# Patient Record
Sex: FEMALE | Race: WHITE | NOT HISPANIC OR LATINO | Employment: STUDENT | URBAN - NONMETROPOLITAN AREA
[De-identification: names, ages, dates, MRNs, and addresses within clinical notes are randomized per-mention and may not be internally consistent; named-entity substitution may affect disease eponyms.]

---

## 2023-11-01 ENCOUNTER — HOSPITAL ENCOUNTER (EMERGENCY)
Facility: HOSPITAL | Age: 28
Discharge: HOME | End: 2023-11-01
Attending: STUDENT IN AN ORGANIZED HEALTH CARE EDUCATION/TRAINING PROGRAM | Admitting: STUDENT IN AN ORGANIZED HEALTH CARE EDUCATION/TRAINING PROGRAM
Payer: COMMERCIAL

## 2023-11-01 ENCOUNTER — APPOINTMENT (OUTPATIENT)
Dept: CT IMAGING | Facility: HOSPITAL | Age: 28
End: 2023-11-01
Payer: COMMERCIAL

## 2023-11-01 VITALS
OXYGEN SATURATION: 100 % | TEMPERATURE: 98.6 F | HEIGHT: 62 IN | RESPIRATION RATE: 18 BRPM | BODY MASS INDEX: 27.23 KG/M2 | HEART RATE: 82 BPM | SYSTOLIC BLOOD PRESSURE: 121 MMHG | DIASTOLIC BLOOD PRESSURE: 93 MMHG | WEIGHT: 148 LBS

## 2023-11-01 DIAGNOSIS — N20.0 NEPHROLITHIASIS: Primary | ICD-10-CM

## 2023-11-01 DIAGNOSIS — N39.0 ACUTE UTI: ICD-10-CM

## 2023-11-01 LAB
ALBUMIN SERPL-MCNC: 4.4 G/DL (ref 3.5–5.2)
ALBUMIN/GLOB SERPL: 1.3 G/DL
ALP SERPL-CCNC: 53 U/L (ref 39–117)
ALT SERPL W P-5'-P-CCNC: 10 U/L (ref 1–33)
ANION GAP SERPL CALCULATED.3IONS-SCNC: 11.8 MMOL/L (ref 5–15)
AST SERPL-CCNC: 11 U/L (ref 1–32)
BACTERIA UR QL AUTO: ABNORMAL /HPF
BASOPHILS # BLD AUTO: 0.11 10*3/MM3 (ref 0–0.2)
BASOPHILS NFR BLD AUTO: 1.2 % (ref 0–1.5)
BILIRUB SERPL-MCNC: 0.6 MG/DL (ref 0–1.2)
BILIRUB UR QL STRIP: NEGATIVE
BUN SERPL-MCNC: 14 MG/DL (ref 6–20)
BUN/CREAT SERPL: 14.6 (ref 7–25)
CALCIUM SPEC-SCNC: 9.3 MG/DL (ref 8.6–10.5)
CHLORIDE SERPL-SCNC: 100 MMOL/L (ref 98–107)
CLARITY UR: CLEAR
CO2 SERPL-SCNC: 24.2 MMOL/L (ref 22–29)
COLOR UR: YELLOW
CREAT SERPL-MCNC: 0.96 MG/DL (ref 0.57–1)
D-LACTATE SERPL-SCNC: 0.8 MMOL/L (ref 0.5–2)
DEPRECATED RDW RBC AUTO: 39.6 FL (ref 37–54)
EGFRCR SERPLBLD CKD-EPI 2021: 82.3 ML/MIN/1.73
EOSINOPHIL # BLD AUTO: 0.14 10*3/MM3 (ref 0–0.4)
EOSINOPHIL NFR BLD AUTO: 1.5 % (ref 0.3–6.2)
ERYTHROCYTE [DISTWIDTH] IN BLOOD BY AUTOMATED COUNT: 12.1 % (ref 12.3–15.4)
GLOBULIN UR ELPH-MCNC: 3.4 GM/DL
GLUCOSE SERPL-MCNC: 106 MG/DL (ref 65–99)
GLUCOSE UR STRIP-MCNC: NEGATIVE MG/DL
HCG SERPL QL: NEGATIVE
HCT VFR BLD AUTO: 45.5 % (ref 34–46.6)
HGB BLD-MCNC: 15.6 G/DL (ref 12–15.9)
HGB UR QL STRIP.AUTO: ABNORMAL
HOLD SPECIMEN: NORMAL
HOLD SPECIMEN: NORMAL
HYALINE CASTS UR QL AUTO: ABNORMAL /LPF
IMM GRANULOCYTES # BLD AUTO: 0.03 10*3/MM3 (ref 0–0.05)
IMM GRANULOCYTES NFR BLD AUTO: 0.3 % (ref 0–0.5)
KETONES UR QL STRIP: ABNORMAL
LEUKOCYTE ESTERASE UR QL STRIP.AUTO: ABNORMAL
LIPASE SERPL-CCNC: 33 U/L (ref 13–60)
LYMPHOCYTES # BLD AUTO: 2.43 10*3/MM3 (ref 0.7–3.1)
LYMPHOCYTES NFR BLD AUTO: 25.7 % (ref 19.6–45.3)
MCH RBC QN AUTO: 30.9 PG (ref 26.6–33)
MCHC RBC AUTO-ENTMCNC: 34.3 G/DL (ref 31.5–35.7)
MCV RBC AUTO: 90.1 FL (ref 79–97)
MONOCYTES # BLD AUTO: 0.9 10*3/MM3 (ref 0.1–0.9)
MONOCYTES NFR BLD AUTO: 9.5 % (ref 5–12)
NEUTROPHILS NFR BLD AUTO: 5.83 10*3/MM3 (ref 1.7–7)
NEUTROPHILS NFR BLD AUTO: 61.8 % (ref 42.7–76)
NITRITE UR QL STRIP: NEGATIVE
NRBC BLD AUTO-RTO: 0 /100 WBC (ref 0–0.2)
PH UR STRIP.AUTO: 6 [PH] (ref 5–8)
PLATELET # BLD AUTO: 412 10*3/MM3 (ref 140–450)
PMV BLD AUTO: 11.2 FL (ref 6–12)
POTASSIUM SERPL-SCNC: 4 MMOL/L (ref 3.5–5.2)
PROT SERPL-MCNC: 7.8 G/DL (ref 6–8.5)
PROT UR QL STRIP: ABNORMAL
RBC # BLD AUTO: 5.05 10*6/MM3 (ref 3.77–5.28)
RBC # UR STRIP: ABNORMAL /HPF
REF LAB TEST METHOD: ABNORMAL
SODIUM SERPL-SCNC: 136 MMOL/L (ref 136–145)
SP GR UR STRIP: 1.02 (ref 1–1.03)
SQUAMOUS #/AREA URNS HPF: ABNORMAL /HPF
UROBILINOGEN UR QL STRIP: ABNORMAL
WBC # UR STRIP: ABNORMAL /HPF
WBC NRBC COR # BLD: 9.44 10*3/MM3 (ref 3.4–10.8)
WHOLE BLOOD HOLD COAG: NORMAL
WHOLE BLOOD HOLD SPECIMEN: NORMAL

## 2023-11-01 PROCEDURE — 25010000002 ONDANSETRON PER 1 MG: Performed by: STUDENT IN AN ORGANIZED HEALTH CARE EDUCATION/TRAINING PROGRAM

## 2023-11-01 PROCEDURE — 83605 ASSAY OF LACTIC ACID: CPT | Performed by: STUDENT IN AN ORGANIZED HEALTH CARE EDUCATION/TRAINING PROGRAM

## 2023-11-01 PROCEDURE — 25810000003 SODIUM CHLORIDE 0.9 % SOLUTION: Performed by: STUDENT IN AN ORGANIZED HEALTH CARE EDUCATION/TRAINING PROGRAM

## 2023-11-01 PROCEDURE — 25510000001 IOPAMIDOL 61 % SOLUTION: Performed by: STUDENT IN AN ORGANIZED HEALTH CARE EDUCATION/TRAINING PROGRAM

## 2023-11-01 PROCEDURE — 74177 CT ABD & PELVIS W/CONTRAST: CPT

## 2023-11-01 PROCEDURE — 83690 ASSAY OF LIPASE: CPT | Performed by: STUDENT IN AN ORGANIZED HEALTH CARE EDUCATION/TRAINING PROGRAM

## 2023-11-01 PROCEDURE — 96374 THER/PROPH/DIAG INJ IV PUSH: CPT

## 2023-11-01 PROCEDURE — 25010000002 KETOROLAC TROMETHAMINE PER 15 MG: Performed by: STUDENT IN AN ORGANIZED HEALTH CARE EDUCATION/TRAINING PROGRAM

## 2023-11-01 PROCEDURE — 84703 CHORIONIC GONADOTROPIN ASSAY: CPT | Performed by: STUDENT IN AN ORGANIZED HEALTH CARE EDUCATION/TRAINING PROGRAM

## 2023-11-01 PROCEDURE — 74177 CT ABD & PELVIS W/CONTRAST: CPT | Performed by: RADIOLOGY

## 2023-11-01 PROCEDURE — 80053 COMPREHEN METABOLIC PANEL: CPT | Performed by: STUDENT IN AN ORGANIZED HEALTH CARE EDUCATION/TRAINING PROGRAM

## 2023-11-01 PROCEDURE — 81001 URINALYSIS AUTO W/SCOPE: CPT | Performed by: STUDENT IN AN ORGANIZED HEALTH CARE EDUCATION/TRAINING PROGRAM

## 2023-11-01 PROCEDURE — 96375 TX/PRO/DX INJ NEW DRUG ADDON: CPT

## 2023-11-01 PROCEDURE — 85025 COMPLETE CBC W/AUTO DIFF WBC: CPT | Performed by: STUDENT IN AN ORGANIZED HEALTH CARE EDUCATION/TRAINING PROGRAM

## 2023-11-01 PROCEDURE — 99285 EMERGENCY DEPT VISIT HI MDM: CPT

## 2023-11-01 RX ORDER — TAMSULOSIN HYDROCHLORIDE 0.4 MG/1
1 CAPSULE ORAL DAILY
Qty: 30 CAPSULE | Refills: 0 | Status: SHIPPED | OUTPATIENT
Start: 2023-11-01

## 2023-11-01 RX ORDER — ONDANSETRON 2 MG/ML
4 INJECTION INTRAMUSCULAR; INTRAVENOUS ONCE
Status: COMPLETED | OUTPATIENT
Start: 2023-11-01 | End: 2023-11-01

## 2023-11-01 RX ORDER — SODIUM CHLORIDE 0.9 % (FLUSH) 0.9 %
10 SYRINGE (ML) INJECTION AS NEEDED
Status: DISCONTINUED | OUTPATIENT
Start: 2023-11-01 | End: 2023-11-01 | Stop reason: HOSPADM

## 2023-11-01 RX ORDER — CEPHALEXIN 250 MG/1
500 CAPSULE ORAL ONCE
Status: COMPLETED | OUTPATIENT
Start: 2023-11-01 | End: 2023-11-01

## 2023-11-01 RX ORDER — CEPHALEXIN 500 MG/1
500 CAPSULE ORAL 2 TIMES DAILY
Qty: 13 CAPSULE | Refills: 0 | OUTPATIENT
Start: 2023-11-01 | End: 2023-11-05

## 2023-11-01 RX ORDER — HYDROCODONE BITARTRATE AND ACETAMINOPHEN 5; 325 MG/1; MG/1
1 TABLET ORAL ONCE
Status: COMPLETED | OUTPATIENT
Start: 2023-11-01 | End: 2023-11-01

## 2023-11-01 RX ORDER — KETOROLAC TROMETHAMINE 30 MG/ML
30 INJECTION, SOLUTION INTRAMUSCULAR; INTRAVENOUS ONCE
Status: COMPLETED | OUTPATIENT
Start: 2023-11-01 | End: 2023-11-01

## 2023-11-01 RX ORDER — HYDROCODONE BITARTRATE AND ACETAMINOPHEN 5; 325 MG/1; MG/1
1 TABLET ORAL EVERY 8 HOURS PRN
Qty: 10 TABLET | Refills: 0 | Status: SHIPPED | OUTPATIENT
Start: 2023-11-01 | End: 2023-11-02 | Stop reason: SDUPTHER

## 2023-11-01 RX ADMIN — CEPHALEXIN 500 MG: 250 CAPSULE ORAL at 12:16

## 2023-11-01 RX ADMIN — IOPAMIDOL 100 ML: 612 INJECTION, SOLUTION INTRAVENOUS at 13:02

## 2023-11-01 RX ADMIN — SODIUM CHLORIDE 1000 ML: 9 INJECTION, SOLUTION INTRAVENOUS at 11:12

## 2023-11-01 RX ADMIN — ONDANSETRON 4 MG: 2 INJECTION INTRAMUSCULAR; INTRAVENOUS at 11:12

## 2023-11-01 RX ADMIN — KETOROLAC TROMETHAMINE 30 MG: 30 INJECTION, SOLUTION INTRAMUSCULAR; INTRAVENOUS at 11:12

## 2023-11-01 RX ADMIN — HYDROCODONE BITARTRATE AND ACETAMINOPHEN 1 TABLET: 5; 325 TABLET ORAL at 13:57

## 2023-11-01 NOTE — ED PROVIDER NOTES
Subjective   History of Present Illness  28-year-old female presents to the ER with a primary complaint of right-sided flank pain this been present for the past 3 to 4 days.  Patient noted some intermittent nausea.  No significant vomiting.  Difficulty with urination noted.  Mild dysuria.  No obvious hematuria.  No obvious aggravating or alleviating factors.  Vital signs stable.  Afebrile      Review of Systems   Genitourinary:  Positive for flank pain.   All other systems reviewed and are negative.      Past Medical History:   Diagnosis Date    ADHD (attention deficit hyperactivity disorder)     Anxiety     Depression        Allergies   Allergen Reactions    Morphine Hives       Past Surgical History:   Procedure Laterality Date    WRIST SURGERY         No family history on file.    Social History     Socioeconomic History    Marital status: Single   Tobacco Use    Smoking status: Every Day     Packs/day: .5     Types: Cigarettes    Smokeless tobacco: Never   Substance and Sexual Activity    Alcohol use: No    Drug use: No           Objective   Physical Exam  Constitutional:       General: She is not in acute distress.     Appearance: Normal appearance. She is not ill-appearing.   HENT:      Head: Normocephalic and atraumatic.      Right Ear: External ear normal.      Left Ear: External ear normal.      Nose: Nose normal.      Mouth/Throat:      Mouth: Mucous membranes are moist.   Eyes:      Extraocular Movements: Extraocular movements intact.      Pupils: Pupils are equal, round, and reactive to light.   Cardiovascular:      Rate and Rhythm: Normal rate and regular rhythm.      Heart sounds: No murmur heard.  Pulmonary:      Effort: Pulmonary effort is normal. No respiratory distress.      Breath sounds: Normal breath sounds. No wheezing.   Abdominal:      General: Bowel sounds are normal.      Palpations: Abdomen is soft.      Tenderness: There is no abdominal tenderness in the right lower quadrant. There is  right CVA tenderness.   Musculoskeletal:         General: No deformity or signs of injury. Normal range of motion.      Cervical back: Normal range of motion and neck supple.   Skin:     General: Skin is warm and dry.      Findings: No erythema.   Neurological:      General: No focal deficit present.      Mental Status: She is alert and oriented to person, place, and time. Mental status is at baseline.      Cranial Nerves: No cranial nerve deficit.   Psychiatric:         Mood and Affect: Mood normal.         Behavior: Behavior normal.         Thought Content: Thought content normal.         Procedures           ED Course      CT Abdomen Pelvis With Contrast    Result Date: 11/1/2023  1.  Moderate right obstructive uropathy with right-sided hydronephrosis noted secondary to an obstructing 9.3 mm right UPJ stone. 2.  Cholelithiasis noted. 3. Other nonacute findings above.   This report was finalized on 11/1/2023 1:13 PM by Dr. Omega Hutton MD.         Results for orders placed or performed during the hospital encounter of 11/01/23   Comprehensive Metabolic Panel    Specimen: Arm, Left; Blood   Result Value Ref Range    Glucose 106 (H) 65 - 99 mg/dL    BUN 14 6 - 20 mg/dL    Creatinine 0.96 0.57 - 1.00 mg/dL    Sodium 136 136 - 145 mmol/L    Potassium 4.0 3.5 - 5.2 mmol/L    Chloride 100 98 - 107 mmol/L    CO2 24.2 22.0 - 29.0 mmol/L    Calcium 9.3 8.6 - 10.5 mg/dL    Total Protein 7.8 6.0 - 8.5 g/dL    Albumin 4.4 3.5 - 5.2 g/dL    ALT (SGPT) 10 1 - 33 U/L    AST (SGOT) 11 1 - 32 U/L    Alkaline Phosphatase 53 39 - 117 U/L    Total Bilirubin 0.6 0.0 - 1.2 mg/dL    Globulin 3.4 gm/dL    A/G Ratio 1.3 g/dL    BUN/Creatinine Ratio 14.6 7.0 - 25.0    Anion Gap 11.8 5.0 - 15.0 mmol/L    eGFR 82.3 >60.0 mL/min/1.73   Lipase    Specimen: Arm, Left; Blood   Result Value Ref Range    Lipase 33 13 - 60 U/L   Urinalysis With Microscopic If Indicated (No Culture) - Urine, Clean Catch    Specimen: Urine, Clean Catch   Result  Value Ref Range    Color, UA Yellow Yellow, Straw    Appearance, UA Clear Clear    pH, UA 6.0 5.0 - 8.0    Specific Gravity, UA 1.022 1.005 - 1.030    Glucose, UA Negative Negative    Ketones, UA 15 mg/dL (1+) (A) Negative    Bilirubin, UA Negative Negative    Blood, UA Moderate (2+) (A) Negative    Protein, UA Trace (A) Negative    Leuk Esterase, UA Moderate (2+) (A) Negative    Nitrite, UA Negative Negative    Urobilinogen, UA 0.2 E.U./dL 0.2 - 1.0 E.U./dL   Lactic Acid, Plasma    Specimen: Arm, Left; Blood   Result Value Ref Range    Lactate 0.8 0.5 - 2.0 mmol/L   hCG, Serum, Qualitative    Specimen: Arm, Left; Blood   Result Value Ref Range    HCG Qualitative Negative Negative   CBC Auto Differential    Specimen: Arm, Left; Blood   Result Value Ref Range    WBC 9.44 3.40 - 10.80 10*3/mm3    RBC 5.05 3.77 - 5.28 10*6/mm3    Hemoglobin 15.6 12.0 - 15.9 g/dL    Hematocrit 45.5 34.0 - 46.6 %    MCV 90.1 79.0 - 97.0 fL    MCH 30.9 26.6 - 33.0 pg    MCHC 34.3 31.5 - 35.7 g/dL    RDW 12.1 (L) 12.3 - 15.4 %    RDW-SD 39.6 37.0 - 54.0 fl    MPV 11.2 6.0 - 12.0 fL    Platelets 412 140 - 450 10*3/mm3    Neutrophil % 61.8 42.7 - 76.0 %    Lymphocyte % 25.7 19.6 - 45.3 %    Monocyte % 9.5 5.0 - 12.0 %    Eosinophil % 1.5 0.3 - 6.2 %    Basophil % 1.2 0.0 - 1.5 %    Immature Grans % 0.3 0.0 - 0.5 %    Neutrophils, Absolute 5.83 1.70 - 7.00 10*3/mm3    Lymphocytes, Absolute 2.43 0.70 - 3.10 10*3/mm3    Monocytes, Absolute 0.90 0.10 - 0.90 10*3/mm3    Eosinophils, Absolute 0.14 0.00 - 0.40 10*3/mm3    Basophils, Absolute 0.11 0.00 - 0.20 10*3/mm3    Immature Grans, Absolute 0.03 0.00 - 0.05 10*3/mm3    nRBC 0.0 0.0 - 0.2 /100 WBC   Urinalysis, Microscopic Only - Urine, Clean Catch    Specimen: Urine, Clean Catch   Result Value Ref Range    RBC, UA 11-20 (A) None Seen, 0-2 /HPF    WBC, UA 21-50 (A) None Seen, 0-2 /HPF    Bacteria, UA None Seen None Seen /HPF    Squamous Epithelial Cells, UA 3-6 (A) None Seen, 0-2 /HPF    Hyaline  Casts, UA 7-12 None Seen /LPF    Methodology Automated Microscopy    [place] Urine Culture Tube - Urine, Clean Catch    Specimen: Urine, Clean Catch   Result Value Ref Range    Extra Tube Hold for add-ons.    Green Top (Gel)   Result Value Ref Range    Extra Tube Hold for add-ons.    Lavender Top   Result Value Ref Range    Extra Tube hold for add-on    Light Blue Top   Result Value Ref Range    Extra Tube Hold for add-ons.                                           Medical Decision Making  CBC and CMP unremarkable.  Urinalysis concerning for UTI.  Keflex given.  CT imaging of the abdomen pelvis noted a 9.3 mm UPJ kidney stone.  Cholelithiasis noted.  No other acute findings noted.  IV fluids and Toradol given.  Norco given.  On-call urology team contacted.  Patient has a confirmed appointment with Jules Baker at 10:45 AM on 11/2/2023.  Work up and results were discussed throughly with the patient.  The patient will be discharged for further monitoring and management with their PCP.  Red flags, warning signs, worsening symptoms, and when to return to the ER discussed with and understood by the patient.  Patient will follow up with their PCP in a timely manner.  Vitals stable at discharge.    Amount and/or Complexity of Data Reviewed  Labs: ordered. Decision-making details documented in ED Course.  Radiology: ordered. Decision-making details documented in ED Course.    Risk  Prescription drug management.        Final diagnoses:   Nephrolithiasis   Acute UTI       ED Disposition  ED Disposition       ED Disposition   Discharge    Condition   Stable    Comment   --               No follow-up provider specified.       Medication List        New Prescriptions      cephalexin 500 MG capsule  Commonly known as: KEFLEX  Take 1 capsule by mouth 2 (Two) Times a Day.     HYDROcodone-acetaminophen 5-325 MG per tablet  Commonly known as: NORCO  Take 1 tablet by mouth Every 8 (Eight) Hours As Needed for Moderate Pain.      tamsulosin 0.4 MG capsule 24 hr capsule  Commonly known as: FLOMAX  Take 1 capsule by mouth Daily.               Where to Get Your Medications        These medications were sent to [facility] - [place], [place] - [address] - [phone]  - [phone] FX  [address], [place] [address]      Phone: [phone]   cephalexin 500 MG capsule  HYDROcodone-acetaminophen 5-325 MG per tablet  tamsulosin 0.4 MG capsule 24 hr capsule            Freddy Sparks DO  11/01/23 1335

## 2023-11-02 ENCOUNTER — OFFICE VISIT (OUTPATIENT)
Dept: UROLOGY | Facility: CLINIC | Age: 28
End: 2023-11-02
Payer: COMMERCIAL

## 2023-11-02 VITALS
HEIGHT: 62 IN | BODY MASS INDEX: 27.75 KG/M2 | SYSTOLIC BLOOD PRESSURE: 124 MMHG | HEART RATE: 78 BPM | DIASTOLIC BLOOD PRESSURE: 94 MMHG | WEIGHT: 150.8 LBS

## 2023-11-02 DIAGNOSIS — N20.1 RIGHT URETERAL STONE: Primary | ICD-10-CM

## 2023-11-02 DIAGNOSIS — N20.0 NEPHROLITHIASIS: ICD-10-CM

## 2023-11-02 RX ORDER — GENTAMICIN SULFATE 80 MG/100ML
80 INJECTION, SOLUTION INTRAVENOUS ONCE
Status: CANCELLED | OUTPATIENT
Start: 2023-11-06 | End: 2023-11-02

## 2023-11-02 RX ORDER — HYDROCODONE BITARTRATE AND ACETAMINOPHEN 5; 325 MG/1; MG/1
1 TABLET ORAL EVERY 8 HOURS PRN
Qty: 20 TABLET | Refills: 0 | OUTPATIENT
Start: 2023-11-02 | End: 2023-11-05

## 2023-11-02 RX ORDER — ONDANSETRON 4 MG/1
4 TABLET, FILM COATED ORAL DAILY PRN
Qty: 30 TABLET | Refills: 1 | Status: SHIPPED | OUTPATIENT
Start: 2023-11-02

## 2023-11-02 NOTE — H&P (VIEW-ONLY)
"Chief Complaint:    Chief Complaint   Patient presents with    Nephrolithiasis     New patient, Er follow up        Vital Signs:   /94   Pulse 78   Ht 157.5 cm (62\")   Wt 68.4 kg (150 lb 12.8 oz)   BMI 27.58 kg/m²   Body mass index is 27.58 kg/m².      HPI:  Nicki Schafer is a 28 y.o. female who presents today for initial evaluation     History of Present Illness  Mr. Ruiz presents to the clinic for emergency department follow-up.  She was initially seen in the ER yesterday secondary to right-sided back and flank pain.  She had a CT scan of the abdomen pelvis completed at that time that showed a proximal 9 mm right UPJ stone causing some mild to moderate obstructive uropathy.  She denies any history of kidney stones prior to this.  She states pain is controlled with her hydrocodone's.  Her urine did show possible infection with 2+ leukocytes and 3+ blood however this could be secondary to stone.  She is currently on Flomax and cephalexin.  Given patient's pain and stone size we will schedule her for a uteroscopy with stent placement and possible holmium laser lithotripsy this Monday.      Past Medical History:  Past Medical History:   Diagnosis Date    ADHD (attention deficit hyperactivity disorder)     Anxiety     Depression     Kidney stones        Current Meds:  Current Outpatient Medications   Medication Sig Dispense Refill    amphetamine-dextroamphetamine (Adderall) 20 MG tablet Take 1 tablet by mouth 2 (Two) Times a Day. 60 tablet 0    cephalexin (KEFLEX) 500 MG capsule Take 1 capsule by mouth 2 (Two) Times a Day. 13 capsule 0    tamsulosin (FLOMAX) 0.4 MG capsule 24 hr capsule Take 1 capsule by mouth Daily. 30 capsule 0    HYDROcodone-acetaminophen (NORCO) 5-325 MG per tablet Take 1 tablet by mouth Every 8 (Eight) Hours As Needed for Moderate Pain. 20 tablet 0    ondansetron (Zofran) 4 MG tablet Take 1 tablet by mouth Daily As Needed for Nausea or Vomiting. 30 tablet 1     No current " facility-administered medications for this visit.        Allergies:   Allergies   Allergen Reactions    Morphine Hives        Past Surgical History:  Past Surgical History:   Procedure Laterality Date    WRIST SURGERY         Social History:  Social History     Socioeconomic History    Marital status: Single   Tobacco Use    Smoking status: Every Day     Packs/day: .5     Types: Cigarettes    Smokeless tobacco: Never   Vaping Use    Vaping Use: Every day   Substance and Sexual Activity    Alcohol use: No    Drug use: No    Sexual activity: Defer       Family History:  Family History   Problem Relation Age of Onset    No Known Problems Father     No Known Problems Mother        Review of Systems:  Review of Systems   Constitutional:  Positive for fatigue. Negative for chills, fever and unexpected weight change.   HENT:  Negative for congestion and sinus pressure.    Respiratory:  Negative for chest tightness and shortness of breath.    Cardiovascular:  Negative for chest pain.   Gastrointestinal:  Positive for constipation. Negative for abdominal pain, diarrhea, nausea and vomiting.   Genitourinary:  Positive for flank pain and frequency. Negative for difficulty urinating, dysuria, hematuria and urgency.   Musculoskeletal:  Positive for back pain. Negative for neck pain.   Skin:  Negative for rash.   Allergic/Immunologic: Negative for food allergies.   Neurological:  Positive for headaches. Negative for dizziness.   Hematological:  Does not bruise/bleed easily.   Psychiatric/Behavioral:  Negative for confusion and suicidal ideas. The patient is nervous/anxious.        Physical Exam:  Physical Exam  Constitutional:       General: She is not in acute distress.     Appearance: Normal appearance.   HENT:      Head: Normocephalic and atraumatic.      Nose: Nose normal.      Mouth/Throat:      Mouth: Mucous membranes are moist.   Eyes:      Conjunctiva/sclera: Conjunctivae normal.   Cardiovascular:      Rate and Rhythm:  Normal rate and regular rhythm.      Pulses: Normal pulses.      Heart sounds: Normal heart sounds.   Pulmonary:      Effort: Pulmonary effort is normal.      Breath sounds: Normal breath sounds.   Abdominal:      General: Bowel sounds are normal.      Palpations: Abdomen is soft.      Tenderness: There is no right CVA tenderness or left CVA tenderness.   Musculoskeletal:         General: Normal range of motion.      Cervical back: Normal range of motion.   Skin:     General: Skin is warm.   Neurological:      General: No focal deficit present.      Mental Status: She is alert and oriented to person, place, and time.   Psychiatric:         Mood and Affect: Mood normal.         Behavior: Behavior normal.         Thought Content: Thought content normal.         Judgment: Judgment normal.       Recent Image (CT and/or KUB):   CT Abdomen and Pelvis: No results found for this or any previous visit.     CT Stone Protocol: No results found for this or any previous visit.     KUB: No results found for this or any previous visit.       Labs:  Brief Urine Lab Results  (Last result in the past 365 days)        Color   Clarity   Blood   Leuk Est   Nitrite   Protein   CREAT   Urine HCG        11/01/23 1032 Yellow   Clear   Moderate (2+)   Moderate (2+)   Negative   Trace                 Admission on 11/01/2023, Discharged on 11/01/2023   Component Date Value Ref Range Status    Glucose 11/01/2023 106 (H)  65 - 99 mg/dL Final    BUN 11/01/2023 14  6 - 20 mg/dL Final    Creatinine 11/01/2023 0.96  0.57 - 1.00 mg/dL Final    Sodium 11/01/2023 136  136 - 145 mmol/L Final    Potassium 11/01/2023 4.0  3.5 - 5.2 mmol/L Final    Chloride 11/01/2023 100  98 - 107 mmol/L Final    CO2 11/01/2023 24.2  22.0 - 29.0 mmol/L Final    Calcium 11/01/2023 9.3  8.6 - 10.5 mg/dL Final    Total Protein 11/01/2023 7.8  6.0 - 8.5 g/dL Final    Albumin 11/01/2023 4.4  3.5 - 5.2 g/dL Final    ALT (SGPT) 11/01/2023 10  1 - 33 U/L Final    AST (SGOT)  11/01/2023 11  1 - 32 U/L Final    Alkaline Phosphatase 11/01/2023 53  39 - 117 U/L Final    Total Bilirubin 11/01/2023 0.6  0.0 - 1.2 mg/dL Final    Globulin 11/01/2023 3.4  gm/dL Final    A/G Ratio 11/01/2023 1.3  g/dL Final    BUN/Creatinine Ratio 11/01/2023 14.6  7.0 - 25.0 Final    Anion Gap 11/01/2023 11.8  5.0 - 15.0 mmol/L Final    eGFR 11/01/2023 82.3  >60.0 mL/min/1.73 Final    Lipase 11/01/2023 33  13 - 60 U/L Final    Color, UA 11/01/2023 Yellow  Yellow, Straw Final    Appearance, UA 11/01/2023 Clear  Clear Final    pH, UA 11/01/2023 6.0  5.0 - 8.0 Final    Specific [place], UA 11/01/2023 1.022  1.005 - 1.030 Final    Glucose, UA 11/01/2023 Negative  Negative Final    Ketones, UA 11/01/2023 15 mg/dL (1+) (A)  Negative Final    Bilirubin, UA 11/01/2023 Negative  Negative Final    Blood, UA 11/01/2023 Moderate (2+) (A)  Negative Final    Protein, UA 11/01/2023 Trace (A)  Negative Final    Leuk Esterase, UA 11/01/2023 Moderate (2+) (A)  Negative Final    Nitrite, UA 11/01/2023 Negative  Negative Final    Urobilinogen, UA 11/01/2023 0.2 E.U./dL  0.2 - 1.0 E.U./dL Final    Lactate 11/01/2023 0.8  0.5 - 2.0 mmol/L Final    HCG Qualitative 11/01/2023 Negative  Negative Final    Extra Tube 11/01/2023 Hold for add-ons.   Final    Auto resulted.    Extra Tube 11/01/2023 hold for add-on   Final    Auto resulted    Extra Tube 11/01/2023 Hold for add-ons.   Final    Auto resulted    WBC 11/01/2023 9.44  3.40 - 10.80 10*3/mm3 Final    RBC 11/01/2023 5.05  3.77 - 5.28 10*6/mm3 Final    Hemoglobin 11/01/2023 15.6  12.0 - 15.9 g/dL Final    Hematocrit 11/01/2023 45.5  34.0 - 46.6 % Final    MCV 11/01/2023 90.1  79.0 - 97.0 fL Final    MCH 11/01/2023 30.9  26.6 - 33.0 pg Final    MCHC 11/01/2023 34.3  31.5 - 35.7 g/dL Final    RDW 11/01/2023 12.1 (L)  12.3 - 15.4 % Final    RDW-SD 11/01/2023 39.6  37.0 - 54.0 fl Final    MPV 11/01/2023 11.2  6.0 - 12.0 fL Final    Platelets 11/01/2023 412  140 - 450 10*3/mm3 Final     Neutrophil % 11/01/2023 61.8  42.7 - 76.0 % Final    Lymphocyte % 11/01/2023 25.7  19.6 - 45.3 % Final    Monocyte % 11/01/2023 9.5  5.0 - 12.0 % Final    Eosinophil % 11/01/2023 1.5  0.3 - 6.2 % Final    Basophil % 11/01/2023 1.2  0.0 - 1.5 % Final    Immature Grans % 11/01/2023 0.3  0.0 - 0.5 % Final    Neutrophils, Absolute 11/01/2023 5.83  1.70 - 7.00 10*3/mm3 Final    Lymphocytes, Absolute 11/01/2023 2.43  0.70 - 3.10 10*3/mm3 Final    Monocytes, Absolute 11/01/2023 0.90  0.10 - 0.90 10*3/mm3 Final    Eosinophils, Absolute 11/01/2023 0.14  0.00 - 0.40 10*3/mm3 Final    Basophils, Absolute 11/01/2023 0.11  0.00 - 0.20 10*3/mm3 Final    Immature Grans, Absolute 11/01/2023 0.03  0.00 - 0.05 10*3/mm3 Final    nRBC 11/01/2023 0.0  0.0 - 0.2 /100 WBC Final    RBC, UA 11/01/2023 11-20 (A)  None Seen, 0-2 /HPF Final    WBC, UA 11/01/2023 21-50 (A)  None Seen, 0-2 /HPF Final    Bacteria, UA 11/01/2023 None Seen  None Seen /HPF Final    Squamous Epithelial Cells, UA 11/01/2023 3-6 (A)  None Seen, 0-2 /HPF Final    Hyaline Casts, UA 11/01/2023 7-12  None Seen /LPF Final    Methodology 11/01/2023 Automated Microscopy   Final    Extra Tube 11/01/2023 Hold for add-ons.   Final    Auto resulted.        Procedure: None  Procedures     I have reviewed and agree with the above PMH, PSH, FMH, social history, medications, allergies, and labs.     Assessment/Plan:   Problem List Items Addressed This Visit          Genitourinary and Reproductive     Right ureteral stone - Primary    Relevant Medications    ondansetron (Zofran) 4 MG tablet    Other Relevant Orders    Case Request (Completed)       Health Maintenance:   Health Maintenance Due   Topic Date Due    BMI FOLLOWUP  Never done    COVID-19 Vaccine (1) Never done    Pneumococcal Vaccine 0-64 (1 - PCV) Never done    TDAP/TD VACCINES (1 - Tdap) Never done    HEPATITIS C SCREENING  Never done    ANNUAL PHYSICAL  Never done    PAP SMEAR  Never done    INFLUENZA VACCINE  Never done         Smoking Counseling: Everyday smoker.  Never used smokeless tobacco.    Urine Incontinence: Patient reports that she is not currently experiencing any symptoms of urinary incontinence.    Patient was given instructions and counseling regarding her condition or for health maintenance advice. Please see specific information pulled into the AVS if appropriate.    Patient Education:   Renal calculus - It was discussed with the patient the presence of a right UPJ calculus with moderate obstructive uropathy on CT scan. We discussed the various therapeutic options available including percutaneous nephrostolithotomy, ureteroscopy and extracorporeal shockwave  lithotripsy.  We discussed the risks of lithotripsy including the passage of stones leading to a 3% chance of Steinstrasse or a large string of stones in the distal ureter. In this incidence the patient was informed that a ureteroscopy is indicated for obstructing fragments.  Patient was informed of an extremely rare incidence of renal hematoma and the significance of this.  Patient was educated on percutaneous nephrostolithotomy and its use as well as the risks and benefits such as the need for postoperative hospitalization, and the risk of damage to the kidney and the remote risk of a nephrectomy.  We also discussed the use of ureteroscopy in the upper tracts and its decreased success rate to completely remove the stones likely causing stent placement leading to an additional procedure for removal.  We discussed the absolute relative indicators for intervention including the presence of sepsis and uncontrollable pain leading to need for urgent intervention.  We discussed placement of a stent if indicated and the management of the stent as well.  Given patient continuation of pain we will schedule her for an urgent ureteroscopy with stent placement and possible holmium laser lithotripsy this coming Monday.  I will send in Zofran for her to take as needed every  4-6 hours for nausea or vomiting.  Advised to continue Flomax and cephalexin as prescribed by ER provider.  We will send in pain medications for the patient to take as needed until date of surgery.  Narcotic pain medication - patient has significant acute pain that I believe would be an indication for the use of narcotic pain medication. I discussed the significant risks of pain medication and the fact that this will be a short only option and I will give her no more than a three-day supply of pain medication, I will not plan long-term medication, and that this will be sent to a pain clinic if it at all becomes necessary. We discussed signing a pain medication agreement and the fact that we're going to run a state JOSE review to be sure the patient is not getting pain medication from elsewhere. If this is the case, we will not give pain medication as part of the patient's treatment plan of there being prescribed a controlled substance with potential for abuse. This patient has been well aware of the appropriate dose of such medications including the risks for somnolence, limited ability to drive and/or safety and the significant potential for overdose. It has been made clear that these medications are for the prescribed patient only without concomitant use of alcohol or other substance unless prescribed by the medical provider. Has completed prescribing agreement detailing the terms of continue prescribing him a controlled substance including monitoring Jose reports, the possibility of urine drug screens, and pill counts. The patient is aware that we review JOSE reports on a regular basis and scan them into the chart. History and physical examination exhibited continued safe and appropriate use of controlled substances. We also discussed the fact that the new [place] legislation allows only a three-day prescription for pain medication. In this situation he will be referred to a chronic pain clinic.     Visit  Diagnoses:    ICD-10-CM ICD-9-CM   1. Right ureteral stone  N20.1 592.1       Meds Ordered During Visit:  New Medications Ordered This Visit   Medications    ondansetron (Zofran) 4 MG tablet     Sig: Take 1 tablet by mouth Daily As Needed for Nausea or Vomiting.     Dispense:  30 tablet     Refill:  1       Follow Up Appointment: OR stent placement 11/06/23  No follow-ups on file.      This document has been electronically signed by Jules Baker PA-C   November 2, 2023 20:33 EDT    Part of this note may be an electronic transcription/translation of spoken language to printed text using the Dragon Dictation System.

## 2023-11-02 NOTE — PROGRESS NOTES
"Chief Complaint:    Chief Complaint   Patient presents with    Nephrolithiasis     New patient, Er follow up        Vital Signs:   /94   Pulse 78   Ht 157.5 cm (62\")   Wt 68.4 kg (150 lb 12.8 oz)   BMI 27.58 kg/m²   Body mass index is 27.58 kg/m².      HPI:  Nicki Schafer is a 28 y.o. female who presents today for initial evaluation     History of Present Illness  Mr. Ruiz presents to the clinic for emergency department follow-up.  She was initially seen in the ER yesterday secondary to right-sided back and flank pain.  She had a CT scan of the abdomen pelvis completed at that time that showed a proximal 9 mm right UPJ stone causing some mild to moderate obstructive uropathy.  She denies any history of kidney stones prior to this.  She states pain is controlled with her hydrocodone's.  Her urine did show possible infection with 2+ leukocytes and 3+ blood however this could be secondary to stone.  She is currently on Flomax and cephalexin.  Given patient's pain and stone size we will schedule her for a uteroscopy with stent placement and possible holmium laser lithotripsy this Monday.      Past Medical History:  Past Medical History:   Diagnosis Date    ADHD (attention deficit hyperactivity disorder)     Anxiety     Depression     Kidney stones        Current Meds:  Current Outpatient Medications   Medication Sig Dispense Refill    amphetamine-dextroamphetamine (Adderall) 20 MG tablet Take 1 tablet by mouth 2 (Two) Times a Day. 60 tablet 0    cephalexin (KEFLEX) 500 MG capsule Take 1 capsule by mouth 2 (Two) Times a Day. 13 capsule 0    tamsulosin (FLOMAX) 0.4 MG capsule 24 hr capsule Take 1 capsule by mouth Daily. 30 capsule 0    HYDROcodone-acetaminophen (NORCO) 5-325 MG per tablet Take 1 tablet by mouth Every 8 (Eight) Hours As Needed for Moderate Pain. 20 tablet 0    ondansetron (Zofran) 4 MG tablet Take 1 tablet by mouth Daily As Needed for Nausea or Vomiting. 30 tablet 1     No current " facility-administered medications for this visit.        Allergies:   Allergies   Allergen Reactions    Morphine Hives        Past Surgical History:  Past Surgical History:   Procedure Laterality Date    WRIST SURGERY         Social History:  Social History     Socioeconomic History    Marital status: Single   Tobacco Use    Smoking status: Every Day     Packs/day: .5     Types: Cigarettes    Smokeless tobacco: Never   Vaping Use    Vaping Use: Every day   Substance and Sexual Activity    Alcohol use: No    Drug use: No    Sexual activity: Defer       Family History:  Family History   Problem Relation Age of Onset    No Known Problems Father     No Known Problems Mother        Review of Systems:  Review of Systems   Constitutional:  Positive for fatigue. Negative for chills, fever and unexpected weight change.   HENT:  Negative for congestion and sinus pressure.    Respiratory:  Negative for chest tightness and shortness of breath.    Cardiovascular:  Negative for chest pain.   Gastrointestinal:  Positive for constipation. Negative for abdominal pain, diarrhea, nausea and vomiting.   Genitourinary:  Positive for flank pain and frequency. Negative for difficulty urinating, dysuria, hematuria and urgency.   Musculoskeletal:  Positive for back pain. Negative for neck pain.   Skin:  Negative for rash.   Allergic/Immunologic: Negative for food allergies.   Neurological:  Positive for headaches. Negative for dizziness.   Hematological:  Does not bruise/bleed easily.   Psychiatric/Behavioral:  Negative for confusion and suicidal ideas. The patient is nervous/anxious.        Physical Exam:  Physical Exam  Constitutional:       General: She is not in acute distress.     Appearance: Normal appearance.   HENT:      Head: Normocephalic and atraumatic.      Nose: Nose normal.      Mouth/Throat:      Mouth: Mucous membranes are moist.   Eyes:      Conjunctiva/sclera: Conjunctivae normal.   Cardiovascular:      Rate and Rhythm:  Normal rate and regular rhythm.      Pulses: Normal pulses.      Heart sounds: Normal heart sounds.   Pulmonary:      Effort: Pulmonary effort is normal.      Breath sounds: Normal breath sounds.   Abdominal:      General: Bowel sounds are normal.      Palpations: Abdomen is soft.      Tenderness: There is no right CVA tenderness or left CVA tenderness.   Musculoskeletal:         General: Normal range of motion.      Cervical back: Normal range of motion.   Skin:     General: Skin is warm.   Neurological:      General: No focal deficit present.      Mental Status: She is alert and oriented to person, place, and time.   Psychiatric:         Mood and Affect: Mood normal.         Behavior: Behavior normal.         Thought Content: Thought content normal.         Judgment: Judgment normal.       Recent Image (CT and/or KUB):   CT Abdomen and Pelvis: No results found for this or any previous visit.     CT Stone Protocol: No results found for this or any previous visit.     KUB: No results found for this or any previous visit.       Labs:  Brief Urine Lab Results  (Last result in the past 365 days)        Color   Clarity   Blood   Leuk Est   Nitrite   Protein   CREAT   Urine HCG        11/01/23 1032 Yellow   Clear   Moderate (2+)   Moderate (2+)   Negative   Trace                 Admission on 11/01/2023, Discharged on 11/01/2023   Component Date Value Ref Range Status    Glucose 11/01/2023 106 (H)  65 - 99 mg/dL Final    BUN 11/01/2023 14  6 - 20 mg/dL Final    Creatinine 11/01/2023 0.96  0.57 - 1.00 mg/dL Final    Sodium 11/01/2023 136  136 - 145 mmol/L Final    Potassium 11/01/2023 4.0  3.5 - 5.2 mmol/L Final    Chloride 11/01/2023 100  98 - 107 mmol/L Final    CO2 11/01/2023 24.2  22.0 - 29.0 mmol/L Final    Calcium 11/01/2023 9.3  8.6 - 10.5 mg/dL Final    Total Protein 11/01/2023 7.8  6.0 - 8.5 g/dL Final    Albumin 11/01/2023 4.4  3.5 - 5.2 g/dL Final    ALT (SGPT) 11/01/2023 10  1 - 33 U/L Final    AST (SGOT)  11/01/2023 11  1 - 32 U/L Final    Alkaline Phosphatase 11/01/2023 53  39 - 117 U/L Final    Total Bilirubin 11/01/2023 0.6  0.0 - 1.2 mg/dL Final    Globulin 11/01/2023 3.4  gm/dL Final    A/G Ratio 11/01/2023 1.3  g/dL Final    BUN/Creatinine Ratio 11/01/2023 14.6  7.0 - 25.0 Final    Anion Gap 11/01/2023 11.8  5.0 - 15.0 mmol/L Final    eGFR 11/01/2023 82.3  >60.0 mL/min/1.73 Final    Lipase 11/01/2023 33  13 - 60 U/L Final    Color, UA 11/01/2023 Yellow  Yellow, Straw Final    Appearance, UA 11/01/2023 Clear  Clear Final    pH, UA 11/01/2023 6.0  5.0 - 8.0 Final    Specific [place], UA 11/01/2023 1.022  1.005 - 1.030 Final    Glucose, UA 11/01/2023 Negative  Negative Final    Ketones, UA 11/01/2023 15 mg/dL (1+) (A)  Negative Final    Bilirubin, UA 11/01/2023 Negative  Negative Final    Blood, UA 11/01/2023 Moderate (2+) (A)  Negative Final    Protein, UA 11/01/2023 Trace (A)  Negative Final    Leuk Esterase, UA 11/01/2023 Moderate (2+) (A)  Negative Final    Nitrite, UA 11/01/2023 Negative  Negative Final    Urobilinogen, UA 11/01/2023 0.2 E.U./dL  0.2 - 1.0 E.U./dL Final    Lactate 11/01/2023 0.8  0.5 - 2.0 mmol/L Final    HCG Qualitative 11/01/2023 Negative  Negative Final    Extra Tube 11/01/2023 Hold for add-ons.   Final    Auto resulted.    Extra Tube 11/01/2023 hold for add-on   Final    Auto resulted    Extra Tube 11/01/2023 Hold for add-ons.   Final    Auto resulted    WBC 11/01/2023 9.44  3.40 - 10.80 10*3/mm3 Final    RBC 11/01/2023 5.05  3.77 - 5.28 10*6/mm3 Final    Hemoglobin 11/01/2023 15.6  12.0 - 15.9 g/dL Final    Hematocrit 11/01/2023 45.5  34.0 - 46.6 % Final    MCV 11/01/2023 90.1  79.0 - 97.0 fL Final    MCH 11/01/2023 30.9  26.6 - 33.0 pg Final    MCHC 11/01/2023 34.3  31.5 - 35.7 g/dL Final    RDW 11/01/2023 12.1 (L)  12.3 - 15.4 % Final    RDW-SD 11/01/2023 39.6  37.0 - 54.0 fl Final    MPV 11/01/2023 11.2  6.0 - 12.0 fL Final    Platelets 11/01/2023 412  140 - 450 10*3/mm3 Final     Neutrophil % 11/01/2023 61.8  42.7 - 76.0 % Final    Lymphocyte % 11/01/2023 25.7  19.6 - 45.3 % Final    Monocyte % 11/01/2023 9.5  5.0 - 12.0 % Final    Eosinophil % 11/01/2023 1.5  0.3 - 6.2 % Final    Basophil % 11/01/2023 1.2  0.0 - 1.5 % Final    Immature Grans % 11/01/2023 0.3  0.0 - 0.5 % Final    Neutrophils, Absolute 11/01/2023 5.83  1.70 - 7.00 10*3/mm3 Final    Lymphocytes, Absolute 11/01/2023 2.43  0.70 - 3.10 10*3/mm3 Final    Monocytes, Absolute 11/01/2023 0.90  0.10 - 0.90 10*3/mm3 Final    Eosinophils, Absolute 11/01/2023 0.14  0.00 - 0.40 10*3/mm3 Final    Basophils, Absolute 11/01/2023 0.11  0.00 - 0.20 10*3/mm3 Final    Immature Grans, Absolute 11/01/2023 0.03  0.00 - 0.05 10*3/mm3 Final    nRBC 11/01/2023 0.0  0.0 - 0.2 /100 WBC Final    RBC, UA 11/01/2023 11-20 (A)  None Seen, 0-2 /HPF Final    WBC, UA 11/01/2023 21-50 (A)  None Seen, 0-2 /HPF Final    Bacteria, UA 11/01/2023 None Seen  None Seen /HPF Final    Squamous Epithelial Cells, UA 11/01/2023 3-6 (A)  None Seen, 0-2 /HPF Final    Hyaline Casts, UA 11/01/2023 7-12  None Seen /LPF Final    Methodology 11/01/2023 Automated Microscopy   Final    Extra Tube 11/01/2023 Hold for add-ons.   Final    Auto resulted.        Procedure: None  Procedures     I have reviewed and agree with the above PMH, PSH, FMH, social history, medications, allergies, and labs.     Assessment/Plan:   Problem List Items Addressed This Visit          Genitourinary and Reproductive     Right ureteral stone - Primary    Relevant Medications    ondansetron (Zofran) 4 MG tablet    Other Relevant Orders    Case Request (Completed)       Health Maintenance:   Health Maintenance Due   Topic Date Due    BMI FOLLOWUP  Never done    COVID-19 Vaccine (1) Never done    Pneumococcal Vaccine 0-64 (1 - PCV) Never done    TDAP/TD VACCINES (1 - Tdap) Never done    HEPATITIS C SCREENING  Never done    ANNUAL PHYSICAL  Never done    PAP SMEAR  Never done    INFLUENZA VACCINE  Never done         Smoking Counseling: Everyday smoker.  Never used smokeless tobacco.    Urine Incontinence: Patient reports that she is not currently experiencing any symptoms of urinary incontinence.    Patient was given instructions and counseling regarding her condition or for health maintenance advice. Please see specific information pulled into the AVS if appropriate.    Patient Education:   Renal calculus - It was discussed with the patient the presence of a right UPJ calculus with moderate obstructive uropathy on CT scan. We discussed the various therapeutic options available including percutaneous nephrostolithotomy, ureteroscopy and extracorporeal shockwave  lithotripsy.  We discussed the risks of lithotripsy including the passage of stones leading to a 3% chance of Steinstrasse or a large string of stones in the distal ureter. In this incidence the patient was informed that a ureteroscopy is indicated for obstructing fragments.  Patient was informed of an extremely rare incidence of renal hematoma and the significance of this.  Patient was educated on percutaneous nephrostolithotomy and its use as well as the risks and benefits such as the need for postoperative hospitalization, and the risk of damage to the kidney and the remote risk of a nephrectomy.  We also discussed the use of ureteroscopy in the upper tracts and its decreased success rate to completely remove the stones likely causing stent placement leading to an additional procedure for removal.  We discussed the absolute relative indicators for intervention including the presence of sepsis and uncontrollable pain leading to need for urgent intervention.  We discussed placement of a stent if indicated and the management of the stent as well.  Given patient continuation of pain we will schedule her for an urgent ureteroscopy with stent placement and possible holmium laser lithotripsy this coming Monday.  I will send in Zofran for her to take as needed every  4-6 hours for nausea or vomiting.  Advised to continue Flomax and cephalexin as prescribed by ER provider.  We will send in pain medications for the patient to take as needed until date of surgery.  Narcotic pain medication - patient has significant acute pain that I believe would be an indication for the use of narcotic pain medication. I discussed the significant risks of pain medication and the fact that this will be a short only option and I will give her no more than a three-day supply of pain medication, I will not plan long-term medication, and that this will be sent to a pain clinic if it at all becomes necessary. We discussed signing a pain medication agreement and the fact that we're going to run a state JOSE review to be sure the patient is not getting pain medication from elsewhere. If this is the case, we will not give pain medication as part of the patient's treatment plan of there being prescribed a controlled substance with potential for abuse. This patient has been well aware of the appropriate dose of such medications including the risks for somnolence, limited ability to drive and/or safety and the significant potential for overdose. It has been made clear that these medications are for the prescribed patient only without concomitant use of alcohol or other substance unless prescribed by the medical provider. Has completed prescribing agreement detailing the terms of continue prescribing him a controlled substance including monitoring Jose reports, the possibility of urine drug screens, and pill counts. The patient is aware that we review JOSE reports on a regular basis and scan them into the chart. History and physical examination exhibited continued safe and appropriate use of controlled substances. We also discussed the fact that the new [place] legislation allows only a three-day prescription for pain medication. In this situation he will be referred to a chronic pain clinic.     Visit  Diagnoses:    ICD-10-CM ICD-9-CM   1. Right ureteral stone  N20.1 592.1       Meds Ordered During Visit:  New Medications Ordered This Visit   Medications    ondansetron (Zofran) 4 MG tablet     Sig: Take 1 tablet by mouth Daily As Needed for Nausea or Vomiting.     Dispense:  30 tablet     Refill:  1       Follow Up Appointment: OR stent placement 11/06/23  No follow-ups on file.      This document has been electronically signed by Jules Baker PA-C   November 2, 2023 20:33 EDT    Part of this note may be an electronic transcription/translation of spoken language to printed text using the Dragon Dictation System.

## 2023-11-05 ENCOUNTER — APPOINTMENT (OUTPATIENT)
Dept: GENERAL RADIOLOGY | Facility: HOSPITAL | Age: 28
End: 2023-11-05
Payer: MEDICAID

## 2023-11-05 ENCOUNTER — HOSPITAL ENCOUNTER (EMERGENCY)
Facility: HOSPITAL | Age: 28
Discharge: HOME | End: 2023-11-05
Attending: EMERGENCY MEDICINE | Admitting: EMERGENCY MEDICINE
Payer: MEDICAID

## 2023-11-05 VITALS
SYSTOLIC BLOOD PRESSURE: 121 MMHG | WEIGHT: 148 LBS | HEART RATE: 100 BPM | BODY MASS INDEX: 27.23 KG/M2 | OXYGEN SATURATION: 99 % | DIASTOLIC BLOOD PRESSURE: 88 MMHG | TEMPERATURE: 97.6 F | RESPIRATION RATE: 18 BRPM | HEIGHT: 62 IN

## 2023-11-05 DIAGNOSIS — N20.0 NEPHROLITHIASIS: ICD-10-CM

## 2023-11-05 DIAGNOSIS — N20.1 URETEROLITHIASIS: Primary | ICD-10-CM

## 2023-11-05 LAB
ALBUMIN SERPL-MCNC: 4.3 G/DL (ref 3.5–5.2)
ALBUMIN/GLOB SERPL: 1.5 G/DL
ALP SERPL-CCNC: 45 U/L (ref 39–117)
ALT SERPL W P-5'-P-CCNC: <5 U/L (ref 1–33)
ANION GAP SERPL CALCULATED.3IONS-SCNC: 10.8 MMOL/L (ref 5–15)
AST SERPL-CCNC: 19 U/L (ref 1–32)
BACTERIA UR QL AUTO: ABNORMAL /HPF
BASOPHILS # BLD AUTO: 0.07 10*3/MM3 (ref 0–0.2)
BASOPHILS NFR BLD AUTO: 0.6 % (ref 0–1.5)
BILIRUB SERPL-MCNC: 0.3 MG/DL (ref 0–1.2)
BILIRUB UR QL STRIP: NEGATIVE
BUN SERPL-MCNC: 7 MG/DL (ref 6–20)
BUN/CREAT SERPL: 8.5 (ref 7–25)
CALCIUM SPEC-SCNC: 9.4 MG/DL (ref 8.6–10.5)
CHLORIDE SERPL-SCNC: 103 MMOL/L (ref 98–107)
CLARITY UR: CLEAR
CO2 SERPL-SCNC: 27.2 MMOL/L (ref 22–29)
COLOR UR: YELLOW
CREAT SERPL-MCNC: 0.82 MG/DL (ref 0.57–1)
CRP SERPL-MCNC: <0.3 MG/DL (ref 0–0.5)
DEPRECATED RDW RBC AUTO: 42.4 FL (ref 37–54)
EGFRCR SERPLBLD CKD-EPI 2021: 99.4 ML/MIN/1.73
EOSINOPHIL # BLD AUTO: 0.05 10*3/MM3 (ref 0–0.4)
EOSINOPHIL NFR BLD AUTO: 0.4 % (ref 0.3–6.2)
ERYTHROCYTE [DISTWIDTH] IN BLOOD BY AUTOMATED COUNT: 12.4 % (ref 12.3–15.4)
ERYTHROCYTE [SEDIMENTATION RATE] IN BLOOD: 16 MM/HR (ref 0–20)
GLOBULIN UR ELPH-MCNC: 2.9 GM/DL
GLUCOSE SERPL-MCNC: 101 MG/DL (ref 65–99)
GLUCOSE UR STRIP-MCNC: NEGATIVE MG/DL
HCG SERPL QL: NEGATIVE
HCT VFR BLD AUTO: 43.3 % (ref 34–46.6)
HGB BLD-MCNC: 14.5 G/DL (ref 12–15.9)
HGB UR QL STRIP.AUTO: NEGATIVE
HOLD SPECIMEN: NORMAL
HYALINE CASTS UR QL AUTO: ABNORMAL /LPF
IMM GRANULOCYTES # BLD AUTO: 0.03 10*3/MM3 (ref 0–0.05)
IMM GRANULOCYTES NFR BLD AUTO: 0.3 % (ref 0–0.5)
KETONES UR QL STRIP: ABNORMAL
LEUKOCYTE ESTERASE UR QL STRIP.AUTO: ABNORMAL
LIPASE SERPL-CCNC: 32 U/L (ref 13–60)
LYMPHOCYTES # BLD AUTO: 1.92 10*3/MM3 (ref 0.7–3.1)
LYMPHOCYTES NFR BLD AUTO: 16.5 % (ref 19.6–45.3)
MCH RBC QN AUTO: 31.2 PG (ref 26.6–33)
MCHC RBC AUTO-ENTMCNC: 33.5 G/DL (ref 31.5–35.7)
MCV RBC AUTO: 93.1 FL (ref 79–97)
MONOCYTES # BLD AUTO: 0.91 10*3/MM3 (ref 0.1–0.9)
MONOCYTES NFR BLD AUTO: 7.8 % (ref 5–12)
NEUTROPHILS NFR BLD AUTO: 74.4 % (ref 42.7–76)
NEUTROPHILS NFR BLD AUTO: 8.68 10*3/MM3 (ref 1.7–7)
NITRITE UR QL STRIP: NEGATIVE
NRBC BLD AUTO-RTO: 0 /100 WBC (ref 0–0.2)
PH UR STRIP.AUTO: 7 [PH] (ref 5–8)
PLATELET # BLD AUTO: 380 10*3/MM3 (ref 140–450)
PMV BLD AUTO: 10.9 FL (ref 6–12)
POTASSIUM SERPL-SCNC: 4.4 MMOL/L (ref 3.5–5.2)
PROT SERPL-MCNC: 7.2 G/DL (ref 6–8.5)
PROT UR QL STRIP: NEGATIVE
RBC # BLD AUTO: 4.65 10*6/MM3 (ref 3.77–5.28)
RBC # UR STRIP: ABNORMAL /HPF
REF LAB TEST METHOD: ABNORMAL
SODIUM SERPL-SCNC: 141 MMOL/L (ref 136–145)
SP GR UR STRIP: 1.01 (ref 1–1.03)
SQUAMOUS #/AREA URNS HPF: ABNORMAL /HPF
UROBILINOGEN UR QL STRIP: ABNORMAL
WBC # UR STRIP: ABNORMAL /HPF
WBC NRBC COR # BLD: 11.66 10*3/MM3 (ref 3.4–10.8)
WHOLE BLOOD HOLD COAG: NORMAL
WHOLE BLOOD HOLD SPECIMEN: NORMAL

## 2023-11-05 PROCEDURE — 74018 RADEX ABDOMEN 1 VIEW: CPT | Performed by: RADIOLOGY

## 2023-11-05 PROCEDURE — 87086 URINE CULTURE/COLONY COUNT: CPT | Performed by: PHYSICIAN ASSISTANT

## 2023-11-05 PROCEDURE — 84703 CHORIONIC GONADOTROPIN ASSAY: CPT | Performed by: PHYSICIAN ASSISTANT

## 2023-11-05 PROCEDURE — 25010000002 HYDROMORPHONE PER 4 MG: Performed by: EMERGENCY MEDICINE

## 2023-11-05 PROCEDURE — 96375 TX/PRO/DX INJ NEW DRUG ADDON: CPT

## 2023-11-05 PROCEDURE — 81001 URINALYSIS AUTO W/SCOPE: CPT | Performed by: PHYSICIAN ASSISTANT

## 2023-11-05 PROCEDURE — 83690 ASSAY OF LIPASE: CPT | Performed by: PHYSICIAN ASSISTANT

## 2023-11-05 PROCEDURE — 25810000003 SODIUM CHLORIDE 0.9 % SOLUTION: Performed by: EMERGENCY MEDICINE

## 2023-11-05 PROCEDURE — 85652 RBC SED RATE AUTOMATED: CPT | Performed by: PHYSICIAN ASSISTANT

## 2023-11-05 PROCEDURE — 85025 COMPLETE CBC W/AUTO DIFF WBC: CPT | Performed by: PHYSICIAN ASSISTANT

## 2023-11-05 PROCEDURE — 99283 EMERGENCY DEPT VISIT LOW MDM: CPT

## 2023-11-05 PROCEDURE — 80053 COMPREHEN METABOLIC PANEL: CPT | Performed by: PHYSICIAN ASSISTANT

## 2023-11-05 PROCEDURE — 74018 RADEX ABDOMEN 1 VIEW: CPT

## 2023-11-05 PROCEDURE — 96374 THER/PROPH/DIAG INJ IV PUSH: CPT

## 2023-11-05 PROCEDURE — 86140 C-REACTIVE PROTEIN: CPT | Performed by: PHYSICIAN ASSISTANT

## 2023-11-05 PROCEDURE — 25010000002 ONDANSETRON PER 1 MG: Performed by: EMERGENCY MEDICINE

## 2023-11-05 RX ORDER — CEFDINIR 300 MG/1
300 CAPSULE ORAL 2 TIMES DAILY
Qty: 14 CAPSULE | Refills: 0 | Status: SHIPPED | OUTPATIENT
Start: 2023-11-05 | End: 2023-11-09

## 2023-11-05 RX ORDER — ONDANSETRON 2 MG/ML
4 INJECTION INTRAMUSCULAR; INTRAVENOUS ONCE
Status: COMPLETED | OUTPATIENT
Start: 2023-11-05 | End: 2023-11-05

## 2023-11-05 RX ORDER — HYDROCODONE BITARTRATE AND ACETAMINOPHEN 5; 325 MG/1; MG/1
1 TABLET ORAL EVERY 6 HOURS PRN
Qty: 10 TABLET | Refills: 0 | Status: SHIPPED | OUTPATIENT
Start: 2023-11-05 | End: 2023-11-09 | Stop reason: SDUPTHER

## 2023-11-05 RX ORDER — HYDROCODONE BITARTRATE AND ACETAMINOPHEN 5; 325 MG/1; MG/1
1 TABLET ORAL EVERY 6 HOURS PRN
Qty: 10 TABLET | Refills: 0 | Status: SHIPPED | OUTPATIENT
Start: 2023-11-05 | End: 2023-11-05 | Stop reason: SDUPTHER

## 2023-11-05 RX ORDER — HYDROMORPHONE HYDROCHLORIDE 1 MG/ML
0.5 INJECTION, SOLUTION INTRAMUSCULAR; INTRAVENOUS; SUBCUTANEOUS ONCE
Status: COMPLETED | OUTPATIENT
Start: 2023-11-05 | End: 2023-11-05

## 2023-11-05 RX ORDER — SODIUM CHLORIDE 0.9 % (FLUSH) 0.9 %
10 SYRINGE (ML) INJECTION AS NEEDED
Status: DISCONTINUED | OUTPATIENT
Start: 2023-11-05 | End: 2023-11-05 | Stop reason: HOSPADM

## 2023-11-05 RX ORDER — HYDROCODONE BITARTRATE AND ACETAMINOPHEN 5; 325 MG/1; MG/1
1 TABLET ORAL EVERY 8 HOURS PRN
Qty: 12 TABLET | Refills: 0 | Status: CANCELLED | OUTPATIENT
Start: 2023-11-05

## 2023-11-05 RX ORDER — HYDROCODONE BITARTRATE AND ACETAMINOPHEN 5; 325 MG/1; MG/1
1 TABLET ORAL ONCE
Status: DISCONTINUED | OUTPATIENT
Start: 2023-11-05 | End: 2023-11-05 | Stop reason: HOSPADM

## 2023-11-05 RX ADMIN — SODIUM CHLORIDE 500 ML: 9 INJECTION, SOLUTION INTRAVENOUS at 10:16

## 2023-11-05 RX ADMIN — HYDROMORPHONE HYDROCHLORIDE 0.5 MG: 1 INJECTION, SOLUTION INTRAMUSCULAR; INTRAVENOUS; SUBCUTANEOUS at 10:16

## 2023-11-05 RX ADMIN — ONDANSETRON 4 MG: 2 INJECTION INTRAMUSCULAR; INTRAVENOUS at 10:16

## 2023-11-05 NOTE — ED PROVIDER NOTES
Subjective   History of Present Illness  28-year-old female presents secondary to right flank/lower abdominal pain with nausea vomiting.  Patient has a known kidney stone.  She is to have surgery tomorrow.  She states that she awoke in the middle the night with right flank pain and nausea vomiting.  She denies any diarrhea.  She denies any fever.  She denies any burning with urination Nuno frequency urgency.  She voices no other complaints this time.  She has a past medical history of kidney stone, anxiety, depression, ADHD.  She presents by private vehicle.        Review of Systems   Constitutional: Negative.  Negative for fever.   HENT: Negative.     Respiratory: Negative.     Cardiovascular: Negative.  Negative for chest pain.   Gastrointestinal:  Positive for abdominal pain, nausea and vomiting. Negative for diarrhea.   Endocrine: Negative.    Genitourinary:  Positive for flank pain. Negative for dysuria.   Skin: Negative.    Neurological: Negative.    Psychiatric/Behavioral: Negative.     All other systems reviewed and are negative.      Past Medical History:   Diagnosis Date    ADHD (attention deficit hyperactivity disorder)     Anxiety     Depression     Kidney stones        Allergies   Allergen Reactions    Morphine Hives       Past Surgical History:   Procedure Laterality Date    WRIST SURGERY         Family History   Problem Relation Age of Onset    No Known Problems Father     No Known Problems Mother        Social History     Socioeconomic History    Marital status: Single   Tobacco Use    Smoking status: Former     Packs/day: .5     Types: Cigarettes     Quit date:      Years since quittin.8    Smokeless tobacco: Never   Vaping Use    Vaping Use: Every day   Substance and Sexual Activity    Alcohol use: No    Drug use: No    Sexual activity: Defer     Partners: Female     Birth control/protection: None           Objective   Physical Exam  Vitals and nursing note reviewed.   Constitutional:        General: She is not in acute distress.     Appearance: She is well-developed. She is not diaphoretic.   HENT:      Head: Normocephalic and atraumatic.      Right Ear: External ear normal.      Left Ear: External ear normal.      Nose: Nose normal.   Eyes:      Conjunctiva/sclera: Conjunctivae normal.      Pupils: Pupils are equal, round, and reactive to light.   Neck:      Vascular: No JVD.      Trachea: No tracheal deviation.   Cardiovascular:      Rate and Rhythm: Normal rate and regular rhythm.      Heart sounds: Normal heart sounds. No murmur heard.  Pulmonary:      Effort: Pulmonary effort is normal. No respiratory distress.      Breath sounds: Normal breath sounds. No wheezing.   Abdominal:      General: Bowel sounds are normal.      Palpations: Abdomen is soft.      Tenderness: There is no abdominal tenderness.   Musculoskeletal:         General: No deformity. Normal range of motion.      Cervical back: Normal range of motion and neck supple.   Skin:     General: Skin is warm and dry.      Coloration: Skin is not pale.      Findings: No erythema or rash.   Neurological:      Mental Status: She is alert and oriented to person, place, and time.      Cranial Nerves: No cranial nerve deficit.   Psychiatric:         Behavior: Behavior normal.         Thought Content: Thought content normal.         Procedures           ED Course           Results for orders placed or performed during the hospital encounter of 11/05/23   Comprehensive Metabolic Panel    Specimen: Blood   Result Value Ref Range    Glucose 101 (H) 65 - 99 mg/dL    BUN 7 6 - 20 mg/dL    Creatinine 0.82 0.57 - 1.00 mg/dL    Sodium 141 136 - 145 mmol/L    Potassium 4.4 3.5 - 5.2 mmol/L    Chloride 103 98 - 107 mmol/L    CO2 27.2 22.0 - 29.0 mmol/L    Calcium 9.4 8.6 - 10.5 mg/dL    Total Protein 7.2 6.0 - 8.5 g/dL    Albumin 4.3 3.5 - 5.2 g/dL    ALT (SGPT) <5 1 - 33 U/L    AST (SGOT) 19 1 - 32 U/L    Alkaline Phosphatase 45 39 - 117 U/L    Total  Bilirubin 0.3 0.0 - 1.2 mg/dL    Globulin 2.9 gm/dL    A/G Ratio 1.5 g/dL    BUN/Creatinine Ratio 8.5 7.0 - 25.0    Anion Gap 10.8 5.0 - 15.0 mmol/L    eGFR 99.4 >60.0 mL/min/1.73   Lipase    Specimen: Blood   Result Value Ref Range    Lipase 32 13 - 60 U/L   hCG, Serum, Qualitative    Specimen: Blood   Result Value Ref Range    HCG Qualitative Negative Negative   Urinalysis With Culture If Indicated - Urine, Clean Catch    Specimen: Urine, Clean Catch   Result Value Ref Range    Color, UA Yellow Yellow, Straw    Appearance, UA Clear Clear    pH, UA 7.0 5.0 - 8.0    Specific Gravity, UA 1.012 1.005 - 1.030    Glucose, UA Negative Negative    Ketones, UA Trace (A) Negative    Bilirubin, UA Negative Negative    Blood, UA Negative Negative    Protein, UA Negative Negative    Leuk Esterase, UA Trace (A) Negative    Nitrite, UA Negative Negative    Urobilinogen, UA 0.2 E.U./dL 0.2 - 1.0 E.U./dL   C-reactive Protein    Specimen: Blood   Result Value Ref Range    C-Reactive Protein <0.30 0.00 - 0.50 mg/dL   Sedimentation Rate    Specimen: Blood   Result Value Ref Range    Sed Rate 16 0 - 20 mm/hr   CBC Auto Differential    Specimen: Blood   Result Value Ref Range    WBC 11.66 (H) 3.40 - 10.80 10*3/mm3    RBC 4.65 3.77 - 5.28 10*6/mm3    Hemoglobin 14.5 12.0 - 15.9 g/dL    Hematocrit 43.3 34.0 - 46.6 %    MCV 93.1 79.0 - 97.0 fL    MCH 31.2 26.6 - 33.0 pg    MCHC 33.5 31.5 - 35.7 g/dL    RDW 12.4 12.3 - 15.4 %    RDW-SD 42.4 37.0 - 54.0 fl    MPV 10.9 6.0 - 12.0 fL    Platelets 380 140 - 450 10*3/mm3    Neutrophil % 74.4 42.7 - 76.0 %    Lymphocyte % 16.5 (L) 19.6 - 45.3 %    Monocyte % 7.8 5.0 - 12.0 %    Eosinophil % 0.4 0.3 - 6.2 %    Basophil % 0.6 0.0 - 1.5 %    Immature Grans % 0.3 0.0 - 0.5 %    Neutrophils, Absolute 8.68 (H) 1.70 - 7.00 10*3/mm3    Lymphocytes, Absolute 1.92 0.70 - 3.10 10*3/mm3    Monocytes, Absolute 0.91 (H) 0.10 - 0.90 10*3/mm3    Eosinophils, Absolute 0.05 0.00 - 0.40 10*3/mm3    Basophils,  Absolute 0.07 0.00 - 0.20 10*3/mm3    Immature Grans, Absolute 0.03 0.00 - 0.05 10*3/mm3    nRBC 0.0 0.0 - 0.2 /100 WBC   Urinalysis, Microscopic Only - Urine, Clean Catch    Specimen: Urine, Clean Catch   Result Value Ref Range    RBC, UA 3-5 (A) None Seen, 0-2 /HPF    WBC, UA 11-20 (A) None Seen, 0-2 /HPF    Bacteria, UA None Seen None Seen /HPF    Squamous Epithelial Cells, UA 3-6 (A) None Seen, 0-2 /HPF    Hyaline Casts, UA None Seen None Seen /LPF    Methodology Automated Microscopy    Green Top (Gel)   Result Value Ref Range    Extra Tube Hold for add-ons.    Lavender Top   Result Value Ref Range    Extra Tube hold for add-on    Light Blue Top   Result Value Ref Range    Extra Tube Hold for add-ons.                                        Medical Decision Making  28-year-old female presents secondary to right flank/lower abdominal pain with nausea vomiting.  Patient has a known kidney stone.  She is to have surgery tomorrow.  She states that she awoke in the middle the night with right flank pain and nausea vomiting.  She denies any diarrhea.  She denies any fever.  She denies any burning with urination Nuno frequency urgency.  She voices no other complaints this time.  She has a past medical history of kidney stone, anxiety, depression, ADHD.  She presents by private vehicle.    Problems Addressed:  Ureterolithiasis: complicated acute illness or injury    Amount and/or Complexity of Data Reviewed  Labs: ordered. Decision-making details documented in ED Course.  Radiology: ordered. Decision-making details documented in ED Course.    Risk  Prescription drug management.  Risk Details: Patient was feeling much better at disposition.  Patient was counseled about following up with urology tomorrow for definitive treatment.  She voices understanding.        Final diagnoses:   Ureterolithiasis       ED Disposition  ED Disposition       ED Disposition   Discharge    Condition   Stable    Comment   --                Farhad Allen MD  [address]  [place] [address]  [phone]    In 1 day  As scheduled         Medication List        New Prescriptions      cefdinir 300 MG capsule  Commonly known as: OMNICEF  Take 1 capsule by mouth 2 (Two) Times a Day.            Changed      HYDROcodone-acetaminophen 5-325 MG per tablet  Commonly known as: NORCO  Take 1 tablet by mouth Every 6 (Six) Hours As Needed for Severe Pain.  What changed:   when to take this  reasons to take this            Stop      cephalexin 500 MG capsule  Commonly known as: KEFLEX               Where to Get Your Medications        These medications were sent to [id number] - [place], [place] - [address] AT [facility] OF [address] - [phone] PH - [phone] FX  [address] [place] [address]      Phone: [phone]   HYDROcodone-acetaminophen 5-325 MG per tablet       You can get these medications from any pharmacy    Bring a paper prescription for each of these medications  cefdinir 300 MG capsule            Williams Hussein PA  11/05/23 7111

## 2023-11-06 ENCOUNTER — ANESTHESIA EVENT (OUTPATIENT)
Dept: PERIOP | Facility: HOSPITAL | Age: 28
End: 2023-11-06
Payer: MEDICAID

## 2023-11-06 ENCOUNTER — APPOINTMENT (OUTPATIENT)
Dept: GENERAL RADIOLOGY | Facility: HOSPITAL | Age: 28
End: 2023-11-06
Payer: MEDICAID

## 2023-11-06 ENCOUNTER — HOSPITAL ENCOUNTER (OUTPATIENT)
Facility: HOSPITAL | Age: 28
Setting detail: HOSPITAL OUTPATIENT SURGERY
Discharge: HOME | End: 2023-11-06
Attending: UROLOGY | Admitting: UROLOGY
Payer: MEDICAID

## 2023-11-06 ENCOUNTER — ANESTHESIA (OUTPATIENT)
Dept: PERIOP | Facility: HOSPITAL | Age: 28
End: 2023-11-06
Payer: MEDICAID

## 2023-11-06 VITALS
BODY MASS INDEX: 27.6 KG/M2 | DIASTOLIC BLOOD PRESSURE: 94 MMHG | SYSTOLIC BLOOD PRESSURE: 118 MMHG | RESPIRATION RATE: 18 BRPM | OXYGEN SATURATION: 96 % | HEIGHT: 62 IN | WEIGHT: 150 LBS | TEMPERATURE: 97.4 F | HEART RATE: 94 BPM

## 2023-11-06 DIAGNOSIS — N20.1 RIGHT URETERAL STONE: ICD-10-CM

## 2023-11-06 LAB — BACTERIA SPEC AEROBE CULT: NO GROWTH

## 2023-11-06 PROCEDURE — 25010000002 PROPOFOL 200 MG/20ML EMULSION: Performed by: NURSE ANESTHETIST, CERTIFIED REGISTERED

## 2023-11-06 PROCEDURE — 25810000003 LACTATED RINGERS PER 1000 ML: Performed by: ANESTHESIOLOGY

## 2023-11-06 PROCEDURE — C1769 GUIDE WIRE: HCPCS | Performed by: UROLOGY

## 2023-11-06 PROCEDURE — 25010000002 MIDAZOLAM PER 1 MG: Performed by: NURSE ANESTHETIST, CERTIFIED REGISTERED

## 2023-11-06 PROCEDURE — 25810000003 SODIUM CHLORIDE PER 500 ML: Performed by: UROLOGY

## 2023-11-06 PROCEDURE — 25010000002 FENTANYL CITRATE (PF) 50 MCG/ML SOLUTION: Performed by: NURSE ANESTHETIST, CERTIFIED REGISTERED

## 2023-11-06 PROCEDURE — 25010000002 ONDANSETRON PER 1 MG: Performed by: NURSE ANESTHETIST, CERTIFIED REGISTERED

## 2023-11-06 PROCEDURE — C2617 STENT, NON-COR, TEM W/O DEL: HCPCS | Performed by: UROLOGY

## 2023-11-06 PROCEDURE — 25010000002 GENTAMICIN PER 80 MG

## 2023-11-06 PROCEDURE — C1894 INTRO/SHEATH, NON-LASER: HCPCS | Performed by: UROLOGY

## 2023-11-06 PROCEDURE — 25510000001 IOPAMIDOL 61 % SOLUTION

## 2023-11-06 PROCEDURE — 25510000001 IOPAMIDOL 61 % SOLUTION: Performed by: UROLOGY

## 2023-11-06 PROCEDURE — 52356 CYSTO/URETERO W/LITHOTRIPSY: CPT | Performed by: UROLOGY

## 2023-11-06 PROCEDURE — 74420 UROGRAPHY RTRGR +-KUB: CPT | Performed by: UROLOGY

## 2023-11-06 PROCEDURE — 25810000003 LACTATED RINGERS PER 1000 ML: Performed by: NURSE ANESTHETIST, CERTIFIED REGISTERED

## 2023-11-06 PROCEDURE — 76000 FLUOROSCOPY <1 HR PHYS/QHP: CPT

## 2023-11-06 DEVICE — URETERAL STENT
Type: IMPLANTABLE DEVICE | Site: URETER | Status: FUNCTIONAL
Brand: POLARIS™ ULTRA

## 2023-11-06 RX ORDER — SODIUM CHLORIDE, SODIUM LACTATE, POTASSIUM CHLORIDE, CALCIUM CHLORIDE 600; 310; 30; 20 MG/100ML; MG/100ML; MG/100ML; MG/100ML
125 INJECTION, SOLUTION INTRAVENOUS ONCE
Status: COMPLETED | OUTPATIENT
Start: 2023-11-06 | End: 2023-11-06

## 2023-11-06 RX ORDER — LIDOCAINE HYDROCHLORIDE 20 MG/ML
JELLY TOPICAL AS NEEDED
Status: DISCONTINUED | OUTPATIENT
Start: 2023-11-06 | End: 2023-11-06 | Stop reason: HOSPADM

## 2023-11-06 RX ORDER — LIDOCAINE HYDROCHLORIDE 20 MG/ML
INJECTION, SOLUTION EPIDURAL; INFILTRATION; INTRACAUDAL; PERINEURAL AS NEEDED
Status: DISCONTINUED | OUTPATIENT
Start: 2023-11-06 | End: 2023-11-06 | Stop reason: SURG

## 2023-11-06 RX ORDER — MEPERIDINE HYDROCHLORIDE 25 MG/ML
12.5 INJECTION INTRAMUSCULAR; INTRAVENOUS; SUBCUTANEOUS
Status: DISCONTINUED | OUTPATIENT
Start: 2023-11-06 | End: 2023-11-06 | Stop reason: HOSPADM

## 2023-11-06 RX ORDER — GENTAMICIN SULFATE 80 MG/100ML
80 INJECTION, SOLUTION INTRAVENOUS ONCE
Status: COMPLETED | OUTPATIENT
Start: 2023-11-06 | End: 2023-11-06

## 2023-11-06 RX ORDER — MIDAZOLAM HYDROCHLORIDE 1 MG/ML
INJECTION INTRAMUSCULAR; INTRAVENOUS AS NEEDED
Status: DISCONTINUED | OUTPATIENT
Start: 2023-11-06 | End: 2023-11-06 | Stop reason: SURG

## 2023-11-06 RX ORDER — SODIUM CHLORIDE 0.9 % (FLUSH) 0.9 %
10 SYRINGE (ML) INJECTION AS NEEDED
Status: DISCONTINUED | OUTPATIENT
Start: 2023-11-06 | End: 2023-11-06 | Stop reason: HOSPADM

## 2023-11-06 RX ORDER — FAMOTIDINE 10 MG/ML
INJECTION, SOLUTION INTRAVENOUS AS NEEDED
Status: DISCONTINUED | OUTPATIENT
Start: 2023-11-06 | End: 2023-11-06 | Stop reason: SURG

## 2023-11-06 RX ORDER — IPRATROPIUM BROMIDE AND ALBUTEROL SULFATE 2.5; .5 MG/3ML; MG/3ML
3 SOLUTION RESPIRATORY (INHALATION) ONCE AS NEEDED
Status: DISCONTINUED | OUTPATIENT
Start: 2023-11-06 | End: 2023-11-06 | Stop reason: HOSPADM

## 2023-11-06 RX ORDER — ONDANSETRON 2 MG/ML
INJECTION INTRAMUSCULAR; INTRAVENOUS AS NEEDED
Status: DISCONTINUED | OUTPATIENT
Start: 2023-11-06 | End: 2023-11-06 | Stop reason: SURG

## 2023-11-06 RX ORDER — SODIUM CHLORIDE 9 MG/ML
40 INJECTION, SOLUTION INTRAVENOUS AS NEEDED
Status: DISCONTINUED | OUTPATIENT
Start: 2023-11-06 | End: 2023-11-06 | Stop reason: HOSPADM

## 2023-11-06 RX ORDER — HYDROCODONE BITARTRATE AND ACETAMINOPHEN 10; 325 MG/1; MG/1
1 TABLET ORAL EVERY 6 HOURS PRN
Qty: 12 TABLET | Refills: 0 | Status: SHIPPED | OUTPATIENT
Start: 2023-11-06 | End: 2023-11-09 | Stop reason: SDUPTHER

## 2023-11-06 RX ORDER — SODIUM CHLORIDE, SODIUM LACTATE, POTASSIUM CHLORIDE, CALCIUM CHLORIDE 600; 310; 30; 20 MG/100ML; MG/100ML; MG/100ML; MG/100ML
INJECTION, SOLUTION INTRAVENOUS CONTINUOUS PRN
Status: DISCONTINUED | OUTPATIENT
Start: 2023-11-06 | End: 2023-11-06 | Stop reason: SURG

## 2023-11-06 RX ORDER — SODIUM CHLORIDE 9 MG/ML
INJECTION, SOLUTION INTRAVENOUS AS NEEDED
Status: DISCONTINUED | OUTPATIENT
Start: 2023-11-06 | End: 2023-11-06 | Stop reason: HOSPADM

## 2023-11-06 RX ORDER — FENTANYL CITRATE 50 UG/ML
INJECTION, SOLUTION INTRAMUSCULAR; INTRAVENOUS AS NEEDED
Status: DISCONTINUED | OUTPATIENT
Start: 2023-11-06 | End: 2023-11-06 | Stop reason: SURG

## 2023-11-06 RX ORDER — MIDAZOLAM HYDROCHLORIDE 1 MG/ML
1 INJECTION INTRAMUSCULAR; INTRAVENOUS
Status: DISCONTINUED | OUTPATIENT
Start: 2023-11-06 | End: 2023-11-06 | Stop reason: HOSPADM

## 2023-11-06 RX ORDER — SODIUM CHLORIDE 0.9 % (FLUSH) 0.9 %
10 SYRINGE (ML) INJECTION EVERY 12 HOURS SCHEDULED
Status: DISCONTINUED | OUTPATIENT
Start: 2023-11-06 | End: 2023-11-06 | Stop reason: HOSPADM

## 2023-11-06 RX ORDER — PROPOFOL 10 MG/ML
INJECTION, EMULSION INTRAVENOUS AS NEEDED
Status: DISCONTINUED | OUTPATIENT
Start: 2023-11-06 | End: 2023-11-06 | Stop reason: SURG

## 2023-11-06 RX ORDER — FENTANYL CITRATE 50 UG/ML
50 INJECTION, SOLUTION INTRAMUSCULAR; INTRAVENOUS
Status: DISCONTINUED | OUTPATIENT
Start: 2023-11-06 | End: 2023-11-06 | Stop reason: HOSPADM

## 2023-11-06 RX ORDER — SODIUM CHLORIDE, SODIUM LACTATE, POTASSIUM CHLORIDE, CALCIUM CHLORIDE 600; 310; 30; 20 MG/100ML; MG/100ML; MG/100ML; MG/100ML
100 INJECTION, SOLUTION INTRAVENOUS ONCE AS NEEDED
Status: DISCONTINUED | OUTPATIENT
Start: 2023-11-06 | End: 2023-11-06 | Stop reason: HOSPADM

## 2023-11-06 RX ORDER — ONDANSETRON 2 MG/ML
4 INJECTION INTRAMUSCULAR; INTRAVENOUS AS NEEDED
Status: DISCONTINUED | OUTPATIENT
Start: 2023-11-06 | End: 2023-11-06 | Stop reason: HOSPADM

## 2023-11-06 RX ORDER — KETOROLAC TROMETHAMINE 30 MG/ML
30 INJECTION, SOLUTION INTRAMUSCULAR; INTRAVENOUS EVERY 6 HOURS PRN
Status: DISCONTINUED | OUTPATIENT
Start: 2023-11-06 | End: 2023-11-06 | Stop reason: HOSPADM

## 2023-11-06 RX ADMIN — SODIUM CHLORIDE, POTASSIUM CHLORIDE, SODIUM LACTATE AND CALCIUM CHLORIDE 125 ML/HR: 600; 310; 30; 20 INJECTION, SOLUTION INTRAVENOUS at 08:49

## 2023-11-06 RX ADMIN — LIDOCAINE HYDROCHLORIDE 60 MG: 20 INJECTION, SOLUTION EPIDURAL; INFILTRATION; INTRACAUDAL; PERINEURAL at 08:57

## 2023-11-06 RX ADMIN — ONDANSETRON 4 MG: 2 INJECTION INTRAMUSCULAR; INTRAVENOUS at 09:01

## 2023-11-06 RX ADMIN — GENTAMICIN SULFATE 80 MG: 80 INJECTION, SOLUTION INTRAVENOUS at 09:01

## 2023-11-06 RX ADMIN — MIDAZOLAM HYDROCHLORIDE 2 MG: 1 INJECTION, SOLUTION INTRAMUSCULAR; INTRAVENOUS at 08:54

## 2023-11-06 RX ADMIN — FENTANYL CITRATE 50 MCG: 50 INJECTION, SOLUTION INTRAMUSCULAR; INTRAVENOUS at 09:04

## 2023-11-06 RX ADMIN — SODIUM CHLORIDE, POTASSIUM CHLORIDE, SODIUM LACTATE AND CALCIUM CHLORIDE: 600; 310; 30; 20 INJECTION, SOLUTION INTRAVENOUS at 08:54

## 2023-11-06 RX ADMIN — PROPOFOL 100 MG: 10 INJECTION, EMULSION INTRAVENOUS at 09:11

## 2023-11-06 RX ADMIN — FENTANYL CITRATE 50 MCG: 50 INJECTION, SOLUTION INTRAMUSCULAR; INTRAVENOUS at 08:57

## 2023-11-06 RX ADMIN — PROPOFOL 150 MG: 10 INJECTION, EMULSION INTRAVENOUS at 08:57

## 2023-11-06 RX ADMIN — FAMOTIDINE 20 MG: 10 INJECTION, SOLUTION INTRAVENOUS at 08:54

## 2023-11-06 NOTE — ANESTHESIA PROCEDURE NOTES
Airway  Date/Time: 11/6/2023 8:58 AM  Airway not difficult    General Information and Staff    Patient location during procedure: OR    Indications and Patient Condition    Preoxygenated: yes  Mask difficulty assessment: 0 - not attempted    Final Airway Details  Final airway type: supraglottic airway      Successful airway: LMA  Size 4     Number of attempts at approach: 1  Assessment: lips, teeth, and gum same as pre-op

## 2023-11-06 NOTE — OP NOTE
Nicki Schafer  11/6/2023    Pre-op Diagnosis:   Right ureteral stone [N20.1]    Post-op Diagnosis:     Post-Op Diagnosis Codes:     * Right ureteral stone [N20.1]    Procedure/CPT® Codes:  28-year-old white female with this being her first stone is a right 9 mm UPJ stone she presents for ureteroscopy.  Following an informed consent brought the procedure suite prepped and draped in a low dorsolithotomy position I did a careful cystoscopy which was normal I cannulated the right orifice with a zero 3D angiographic Glidewire placed the ureteral access sheath to the level of the stone and used the laser broke it numerous pieces there is all completely dusted nightshades 1 fragment into a midpole calyx and treated as well when it was done there is no fragments left but dust.  I chose to leave the stent because of the fact that was working in the upper pole.  I placed a double-J 5 x 24 stent in perfect position visually and fluoroscopically with an attached lanyard.  She tolerated it well I will try to leave it for 48 hours if possible.  Retrograde was completely unremarkable with no extravasation or abnormalities identified    Procedure(s):  URETEROSCOPY LASER LITHOTRIPSY WITH STENT INSERTION  Retrograde ureteropyelogram  Surgeon(s):  Farhad Allen MD    Anesthesia: see anesthesia record    Staff:   Circulator: Jennifer Mensah RN  Scrub Person: Mary Arias  Assistant: Kamala Zhao LPN    Estimated Blood Loss: none  Urine Voided: * No values recorded between 11/6/2023  8:54 AM and 11/6/2023  9:29 AM *    Specimens:                None      Drains: 5 x 24 double-J stent    Findings: Excellent fragmentation obvious calcium oxalate dihydrate stone    Blood: N/A    Complications: None    Grafts and Implants: None    Farhad Allen MD     Date: 11/6/2023  Time: 09:35 EST

## 2023-11-06 NOTE — ANESTHESIA POSTPROCEDURE EVALUATION
Patient: Nicki Schafer    Procedure Summary       Date: 11/06/23 Room / Location:  [facility] 06 /  [facility]    Anesthesia Start: 0854 Anesthesia Stop: 0929    Procedure: URETEROSCOPY LASER LITHOTRIPSY WITH STENT INSERTION (Right) Diagnosis:       Right ureteral stone      (Right ureteral stone [N20.1])    Surgeons: Farhad Allen MD Provider: Shiv Jacobo DO    Anesthesia Type: general ASA Status: 2            Anesthesia Type: general    Vitals  Vitals Value Taken Time   /79 11/06/23 0958   Temp 97.1 °F (36.2 °C) 11/06/23 0930   Pulse 81 11/06/23 0959   Resp 11 11/06/23 0958   SpO2 100 % 11/06/23 0959   Vitals shown include unfiled device data.        Post Anesthesia Care and Evaluation    Patient location during evaluation: PHASE II  Patient participation: complete - patient participated  Level of consciousness: awake and alert  Pain score: 0  Pain management: adequate    Airway patency: patent  Anesthetic complications: No anesthetic complications  PONV Status: controlled  Cardiovascular status: acceptable  Respiratory status: acceptable and room air  Hydration status: euvolemic  No anesthesia care post op

## 2023-11-06 NOTE — ANESTHESIA PREPROCEDURE EVALUATION
Anesthesia Evaluation     Patient summary reviewed and Nursing notes reviewed   no history of anesthetic complications:   NPO Solid Status: > 8 hours  NPO Liquid Status: > 8 hours           Airway   Mallampati: II  TM distance: >3 FB  Neck ROM: full  No difficulty expected  Dental    (+) poor dentition    Pulmonary - normal exam    breath sounds clear to auscultation  (+) a smoker (VAPE) Current Smoked day of surgery,  Cardiovascular - negative cardio ROS and normal exam    Rhythm: regular  Rate: normal        Neuro/Psych  (+) psychiatric history  GI/Hepatic/Renal/Endo    (+) renal disease- stones    Musculoskeletal (-) negative ROS    Abdominal  - normal exam   Substance History - negative use     OB/GYN negative ob/gyn ROS         Other - negative ROS                   Anesthesia Plan    ASA 2     general     intravenous induction     Anesthetic plan, risks, benefits, and alternatives have been provided, discussed and informed consent has been obtained with: patient.  Pre-procedure education provided  Plan discussed with CRNA.    CODE STATUS:

## 2023-11-07 ENCOUNTER — HOSPITAL ENCOUNTER (EMERGENCY)
Facility: HOSPITAL | Age: 28
Discharge: HOME | End: 2023-11-07
Attending: STUDENT IN AN ORGANIZED HEALTH CARE EDUCATION/TRAINING PROGRAM | Admitting: STUDENT IN AN ORGANIZED HEALTH CARE EDUCATION/TRAINING PROGRAM
Payer: MEDICAID

## 2023-11-07 ENCOUNTER — APPOINTMENT (OUTPATIENT)
Dept: GENERAL RADIOLOGY | Facility: HOSPITAL | Age: 28
End: 2023-11-07
Payer: MEDICAID

## 2023-11-07 VITALS
HEART RATE: 118 BPM | HEIGHT: 62 IN | OXYGEN SATURATION: 98 % | RESPIRATION RATE: 17 BRPM | WEIGHT: 148 LBS | DIASTOLIC BLOOD PRESSURE: 82 MMHG | BODY MASS INDEX: 27.23 KG/M2 | SYSTOLIC BLOOD PRESSURE: 127 MMHG | TEMPERATURE: 98.5 F

## 2023-11-07 DIAGNOSIS — R11.2 NAUSEA AND VOMITING, UNSPECIFIED VOMITING TYPE: Primary | ICD-10-CM

## 2023-11-07 DIAGNOSIS — R31.9 HEMATURIA, UNSPECIFIED TYPE: ICD-10-CM

## 2023-11-07 LAB
ALBUMIN SERPL-MCNC: 4.2 G/DL (ref 3.5–5.2)
ALBUMIN/GLOB SERPL: 1.4 G/DL
ALP SERPL-CCNC: 43 U/L (ref 39–117)
ALT SERPL W P-5'-P-CCNC: 10 U/L (ref 1–33)
ANION GAP SERPL CALCULATED.3IONS-SCNC: 13.1 MMOL/L (ref 5–15)
AST SERPL-CCNC: 12 U/L (ref 1–32)
BACTERIA UR QL AUTO: ABNORMAL /HPF
BASOPHILS # BLD AUTO: 0.09 10*3/MM3 (ref 0–0.2)
BASOPHILS NFR BLD AUTO: 0.7 % (ref 0–1.5)
BILIRUB SERPL-MCNC: 0.5 MG/DL (ref 0–1.2)
BILIRUB UR QL STRIP: ABNORMAL
BUN SERPL-MCNC: 8 MG/DL (ref 6–20)
BUN/CREAT SERPL: 9.1 (ref 7–25)
CALCIUM SPEC-SCNC: 9.2 MG/DL (ref 8.6–10.5)
CHLORIDE SERPL-SCNC: 102 MMOL/L (ref 98–107)
CLARITY UR: ABNORMAL
CO2 SERPL-SCNC: 22.9 MMOL/L (ref 22–29)
COLOR UR: ABNORMAL
CREAT SERPL-MCNC: 0.88 MG/DL (ref 0.57–1)
CRP SERPL-MCNC: <0.3 MG/DL (ref 0–0.5)
D-LACTATE SERPL-SCNC: 1.2 MMOL/L (ref 0.5–2)
DEPRECATED RDW RBC AUTO: 41.9 FL (ref 37–54)
EGFRCR SERPLBLD CKD-EPI 2021: 91.4 ML/MIN/1.73
EOSINOPHIL # BLD AUTO: 0.11 10*3/MM3 (ref 0–0.4)
EOSINOPHIL NFR BLD AUTO: 0.8 % (ref 0.3–6.2)
ERYTHROCYTE [DISTWIDTH] IN BLOOD BY AUTOMATED COUNT: 12.3 % (ref 12.3–15.4)
ERYTHROCYTE [SEDIMENTATION RATE] IN BLOOD: 15 MM/HR (ref 0–20)
GLOBULIN UR ELPH-MCNC: 3.1 GM/DL
GLUCOSE SERPL-MCNC: 90 MG/DL (ref 65–99)
GLUCOSE UR STRIP-MCNC: NEGATIVE MG/DL
HCG SERPL QL: NEGATIVE
HCT VFR BLD AUTO: 44 % (ref 34–46.6)
HGB BLD-MCNC: 14.7 G/DL (ref 12–15.9)
HGB UR QL STRIP.AUTO: ABNORMAL
HOLD SPECIMEN: NORMAL
HYALINE CASTS UR QL AUTO: ABNORMAL /LPF
IMM GRANULOCYTES # BLD AUTO: 0.06 10*3/MM3 (ref 0–0.05)
IMM GRANULOCYTES NFR BLD AUTO: 0.4 % (ref 0–0.5)
KETONES UR QL STRIP: ABNORMAL
LEUKOCYTE ESTERASE UR QL STRIP.AUTO: ABNORMAL
LIPASE SERPL-CCNC: 20 U/L (ref 13–60)
LYMPHOCYTES # BLD AUTO: 2.31 10*3/MM3 (ref 0.7–3.1)
LYMPHOCYTES NFR BLD AUTO: 17.3 % (ref 19.6–45.3)
MAGNESIUM SERPL-MCNC: 1.8 MG/DL (ref 1.6–2.6)
MCH RBC QN AUTO: 30.9 PG (ref 26.6–33)
MCHC RBC AUTO-ENTMCNC: 33.4 G/DL (ref 31.5–35.7)
MCV RBC AUTO: 92.4 FL (ref 79–97)
MONOCYTES # BLD AUTO: 1.02 10*3/MM3 (ref 0.1–0.9)
MONOCYTES NFR BLD AUTO: 7.6 % (ref 5–12)
NEUTROPHILS NFR BLD AUTO: 73.2 % (ref 42.7–76)
NEUTROPHILS NFR BLD AUTO: 9.75 10*3/MM3 (ref 1.7–7)
NITRITE UR QL STRIP: POSITIVE
NRBC BLD AUTO-RTO: 0 /100 WBC (ref 0–0.2)
PH UR STRIP.AUTO: 6 [PH] (ref 5–8)
PLATELET # BLD AUTO: 404 10*3/MM3 (ref 140–450)
PMV BLD AUTO: 10.9 FL (ref 6–12)
POTASSIUM SERPL-SCNC: 3.7 MMOL/L (ref 3.5–5.2)
PROCALCITONIN SERPL-MCNC: 0.03 NG/ML (ref 0–0.25)
PROT SERPL-MCNC: 7.3 G/DL (ref 6–8.5)
PROT UR QL STRIP: ABNORMAL
RBC # BLD AUTO: 4.76 10*6/MM3 (ref 3.77–5.28)
RBC # UR STRIP: ABNORMAL /HPF
REF LAB TEST METHOD: ABNORMAL
SODIUM SERPL-SCNC: 138 MMOL/L (ref 136–145)
SP GR UR STRIP: 1.02 (ref 1–1.03)
SQUAMOUS #/AREA URNS HPF: ABNORMAL /HPF
UROBILINOGEN UR QL STRIP: ABNORMAL
WBC # UR STRIP: ABNORMAL /HPF
WBC NRBC COR # BLD: 13.34 10*3/MM3 (ref 3.4–10.8)
WHOLE BLOOD HOLD COAG: NORMAL
WHOLE BLOOD HOLD SPECIMEN: NORMAL

## 2023-11-07 PROCEDURE — 85025 COMPLETE CBC W/AUTO DIFF WBC: CPT | Performed by: PHYSICIAN ASSISTANT

## 2023-11-07 PROCEDURE — 81001 URINALYSIS AUTO W/SCOPE: CPT | Performed by: PHYSICIAN ASSISTANT

## 2023-11-07 PROCEDURE — 80053 COMPREHEN METABOLIC PANEL: CPT | Performed by: PHYSICIAN ASSISTANT

## 2023-11-07 PROCEDURE — 96375 TX/PRO/DX INJ NEW DRUG ADDON: CPT

## 2023-11-07 PROCEDURE — 85652 RBC SED RATE AUTOMATED: CPT | Performed by: PHYSICIAN ASSISTANT

## 2023-11-07 PROCEDURE — 36415 COLL VENOUS BLD VENIPUNCTURE: CPT

## 2023-11-07 PROCEDURE — 25010000002 HYDROMORPHONE PER 4 MG: Performed by: STUDENT IN AN ORGANIZED HEALTH CARE EDUCATION/TRAINING PROGRAM

## 2023-11-07 PROCEDURE — 25810000003 SODIUM CHLORIDE 0.9 % SOLUTION: Performed by: PHYSICIAN ASSISTANT

## 2023-11-07 PROCEDURE — 96374 THER/PROPH/DIAG INJ IV PUSH: CPT

## 2023-11-07 PROCEDURE — 86140 C-REACTIVE PROTEIN: CPT | Performed by: PHYSICIAN ASSISTANT

## 2023-11-07 PROCEDURE — 87086 URINE CULTURE/COLONY COUNT: CPT | Performed by: PHYSICIAN ASSISTANT

## 2023-11-07 PROCEDURE — 25010000002 PROCHLORPERAZINE 10 MG/2ML SOLUTION: Performed by: PHYSICIAN ASSISTANT

## 2023-11-07 PROCEDURE — 87040 BLOOD CULTURE FOR BACTERIA: CPT | Performed by: PHYSICIAN ASSISTANT

## 2023-11-07 PROCEDURE — 84703 CHORIONIC GONADOTROPIN ASSAY: CPT | Performed by: PHYSICIAN ASSISTANT

## 2023-11-07 PROCEDURE — 83735 ASSAY OF MAGNESIUM: CPT | Performed by: PHYSICIAN ASSISTANT

## 2023-11-07 PROCEDURE — 83690 ASSAY OF LIPASE: CPT | Performed by: PHYSICIAN ASSISTANT

## 2023-11-07 PROCEDURE — 74018 RADEX ABDOMEN 1 VIEW: CPT | Performed by: RADIOLOGY

## 2023-11-07 PROCEDURE — 99283 EMERGENCY DEPT VISIT LOW MDM: CPT

## 2023-11-07 PROCEDURE — 74018 RADEX ABDOMEN 1 VIEW: CPT

## 2023-11-07 PROCEDURE — 83605 ASSAY OF LACTIC ACID: CPT | Performed by: PHYSICIAN ASSISTANT

## 2023-11-07 PROCEDURE — 84145 PROCALCITONIN (PCT): CPT | Performed by: PHYSICIAN ASSISTANT

## 2023-11-07 RX ORDER — PROCHLORPERAZINE EDISYLATE 5 MG/ML
5 INJECTION INTRAMUSCULAR; INTRAVENOUS ONCE
Status: COMPLETED | OUTPATIENT
Start: 2023-11-07 | End: 2023-11-07

## 2023-11-07 RX ORDER — SODIUM CHLORIDE 0.9 % (FLUSH) 0.9 %
10 SYRINGE (ML) INJECTION AS NEEDED
Status: DISCONTINUED | OUTPATIENT
Start: 2023-11-07 | End: 2023-11-07 | Stop reason: HOSPADM

## 2023-11-07 RX ORDER — HYDROMORPHONE HYDROCHLORIDE 1 MG/ML
0.5 INJECTION, SOLUTION INTRAMUSCULAR; INTRAVENOUS; SUBCUTANEOUS ONCE
Status: COMPLETED | OUTPATIENT
Start: 2023-11-07 | End: 2023-11-07

## 2023-11-07 RX ORDER — PROMETHAZINE HYDROCHLORIDE 25 MG/1
25 SUPPOSITORY RECTAL EVERY 6 HOURS PRN
Qty: 12 SUPPOSITORY | Refills: 0 | Status: SHIPPED | OUTPATIENT
Start: 2023-11-07

## 2023-11-07 RX ADMIN — PROCHLORPERAZINE EDISYLATE 5 MG: 5 INJECTION INTRAMUSCULAR; INTRAVENOUS at 13:45

## 2023-11-07 RX ADMIN — HYDROMORPHONE HYDROCHLORIDE 0.5 MG: 1 INJECTION, SOLUTION INTRAMUSCULAR; INTRAVENOUS; SUBCUTANEOUS at 13:45

## 2023-11-07 RX ADMIN — SODIUM CHLORIDE 2000 ML: 9 INJECTION, SOLUTION INTRAVENOUS at 13:46

## 2023-11-07 NOTE — ED PROVIDER NOTES
Subjective   History of Present Illness  28-year-old female who presents to the ED today for nausea and vomiting with hematuria.  She had a ureteroscopy laser lithotripsy with right ureteral stent placement yesterday.  She states last night after she got home she started to have hematuria and then developed nausea and vomiting.  She states she is having lower abdominal pain that radiates into her back.  She tried to take Zofran and hydrocodone for her symptoms but could not keep it down.  She states she has not been able to keep anything down including sips of water.  She denies any fever.  She denies any diarrhea.  She denies any chest pain or shortness of breath.    History provided by:  Patient  Vomiting  The primary symptoms include abdominal pain, nausea and vomiting. Primary symptoms do not include fever, diarrhea or dysuria. The illness began yesterday. The onset was sudden. The problem has not changed since onset.  The illness is also significant for anorexia and back pain.       Review of Systems   Constitutional:  Positive for appetite change. Negative for fever.   HENT: Negative.     Eyes: Negative.    Respiratory: Negative.     Cardiovascular: Negative.    Gastrointestinal:  Positive for abdominal pain, anorexia, nausea and vomiting. Negative for diarrhea.   Genitourinary:  Positive for hematuria and pelvic pain. Negative for difficulty urinating, dysuria, flank pain, frequency and urgency.   Musculoskeletal:  Positive for back pain.   Skin: Negative.    Neurological: Negative.    Psychiatric/Behavioral: Negative.     All other systems reviewed and are negative.      Past Medical History:   Diagnosis Date    ADHD (attention deficit hyperactivity disorder)     Anxiety     Depression     Kidney stones        Allergies   Allergen Reactions    Morphine Hives       Past Surgical History:   Procedure Laterality Date    URETEROSCOPY STENT INSERTION Right 11/6/2023    Procedure: URETEROSCOPY LASER LITHOTRIPSY  WITH STENT INSERTION;  Surgeon: Farhad Allen MD;  Location: [facility];  Service: Urology;  Laterality: Right;    WRIST SURGERY         Family History   Problem Relation Age of Onset    No Known Problems Father     No Known Problems Mother        Social History     Socioeconomic History    Marital status:    Tobacco Use    Smoking status: Former     Packs/day: .5     Types: Cigarettes     Quit date:      Years since quittin.8    Smokeless tobacco: Never   Vaping Use    Vaping Use: Every day   Substance and Sexual Activity    Alcohol use: No    Drug use: No    Sexual activity: Defer     Partners: Female     Birth control/protection: None           Objective   Physical Exam  Vitals and nursing note reviewed.   Constitutional:       General: She is not in acute distress.     Appearance: She is ill-appearing. She is not diaphoretic.   HENT:      Head: Normocephalic and atraumatic.      Right Ear: External ear normal.      Left Ear: External ear normal.      Nose: Nose normal.   Eyes:      Conjunctiva/sclera: Conjunctivae normal.      Pupils: Pupils are equal, round, and reactive to light.   Cardiovascular:      Rate and Rhythm: Regular rhythm. Tachycardia present.      Pulses: Normal pulses.      Heart sounds: Normal heart sounds.   Pulmonary:      Effort: Pulmonary effort is normal.      Breath sounds: Normal breath sounds.   Abdominal:      General: Bowel sounds are normal.      Palpations: Abdomen is soft.      Tenderness: There is abdominal tenderness (mild tenderness to suprapubic and right side fo the abdomen, no evidence of an acute surgical abdomen). There is no right CVA tenderness, left CVA tenderness, guarding or rebound.   Musculoskeletal:         General: Normal range of motion.      Cervical back: Normal range of motion and neck supple.   Skin:     General: Skin is warm and dry.      Capillary Refill: Capillary refill takes less than 2 seconds.   Neurological:      General: No  focal deficit present.      Mental Status: She is alert and oriented to person, place, and time.   Psychiatric:         Mood and Affect: Mood normal.         Procedures       Results for orders placed or performed during the hospital encounter of 11/07/23   Comprehensive Metabolic Panel    Specimen: Blood   Result Value Ref Range    Glucose 90 65 - 99 mg/dL    BUN 8 6 - 20 mg/dL    Creatinine 0.88 0.57 - 1.00 mg/dL    Sodium 138 136 - 145 mmol/L    Potassium 3.7 3.5 - 5.2 mmol/L    Chloride 102 98 - 107 mmol/L    CO2 22.9 22.0 - 29.0 mmol/L    Calcium 9.2 8.6 - 10.5 mg/dL    Total Protein 7.3 6.0 - 8.5 g/dL    Albumin 4.2 3.5 - 5.2 g/dL    ALT (SGPT) 10 1 - 33 U/L    AST (SGOT) 12 1 - 32 U/L    Alkaline Phosphatase 43 39 - 117 U/L    Total Bilirubin 0.5 0.0 - 1.2 mg/dL    Globulin 3.1 gm/dL    A/G Ratio 1.4 g/dL    BUN/Creatinine Ratio 9.1 7.0 - 25.0    Anion Gap 13.1 5.0 - 15.0 mmol/L    eGFR 91.4 >60.0 mL/min/1.73   Lipase    Specimen: Blood   Result Value Ref Range    Lipase 20 13 - 60 U/L   hCG, Serum, Qualitative    Specimen: Blood   Result Value Ref Range    HCG Qualitative Negative Negative   Urinalysis With Culture If Indicated - Urine, Clean Catch    Specimen: Urine, Clean Catch   Result Value Ref Range    Color, UA Orange (A) Yellow, Straw    Appearance, UA Cloudy (A) Clear    pH, UA 6.0 5.0 - 8.0    Specific Gravity, UA 1.021 1.005 - 1.030    Glucose, UA Negative Negative    Ketones, UA Trace (A) Negative    Bilirubin, UA Small (1+) (A) Negative    Blood, UA Large (3+) (A) Negative    Protein, UA >=300 mg/dL (3+) (A) Negative    Leuk Esterase, UA Moderate (2+) (A) Negative    Nitrite, UA Positive (A) Negative    Urobilinogen, UA 0.2 E.U./dL 0.2 - 1.0 E.U./dL   Lactic Acid, Plasma    Specimen: Blood   Result Value Ref Range    Lactate 1.2 0.5 - 2.0 mmol/L   Procalcitonin    Specimen: Blood   Result Value Ref Range    Procalcitonin 0.03 0.00 - 0.25 ng/mL   Sedimentation Rate    Specimen: Blood   Result  Value Ref Range    Sed Rate 15 0 - 20 mm/hr   C-reactive Protein    Specimen: Blood   Result Value Ref Range    C-Reactive Protein <0.30 0.00 - 0.50 mg/dL   Magnesium    Specimen: Blood   Result Value Ref Range    Magnesium 1.8 1.6 - 2.6 mg/dL   CBC Auto Differential    Specimen: Blood   Result Value Ref Range    WBC 13.34 (H) 3.40 - 10.80 10*3/mm3    RBC 4.76 3.77 - 5.28 10*6/mm3    Hemoglobin 14.7 12.0 - 15.9 g/dL    Hematocrit 44.0 34.0 - 46.6 %    MCV 92.4 79.0 - 97.0 fL    MCH 30.9 26.6 - 33.0 pg    MCHC 33.4 31.5 - 35.7 g/dL    RDW 12.3 12.3 - 15.4 %    RDW-SD 41.9 37.0 - 54.0 fl    MPV 10.9 6.0 - 12.0 fL    Platelets 404 140 - 450 10*3/mm3    Neutrophil % 73.2 42.7 - 76.0 %    Lymphocyte % 17.3 (L) 19.6 - 45.3 %    Monocyte % 7.6 5.0 - 12.0 %    Eosinophil % 0.8 0.3 - 6.2 %    Basophil % 0.7 0.0 - 1.5 %    Immature Grans % 0.4 0.0 - 0.5 %    Neutrophils, Absolute 9.75 (H) 1.70 - 7.00 10*3/mm3    Lymphocytes, Absolute 2.31 0.70 - 3.10 10*3/mm3    Monocytes, Absolute 1.02 (H) 0.10 - 0.90 10*3/mm3    Eosinophils, Absolute 0.11 0.00 - 0.40 10*3/mm3    Basophils, Absolute 0.09 0.00 - 0.20 10*3/mm3    Immature Grans, Absolute 0.06 (H) 0.00 - 0.05 10*3/mm3    nRBC 0.0 0.0 - 0.2 /100 WBC   Urinalysis, Microscopic Only - Urine, Clean Catch    Specimen: Urine, Clean Catch   Result Value Ref Range    RBC, UA Too Numerous to Count (A) None Seen, 0-2 /HPF    WBC, UA 11-20 (A) None Seen, 0-2 /HPF    Bacteria, UA None Seen None Seen /HPF    Squamous Epithelial Cells, UA 0-2 None Seen, 0-2 /HPF    Hyaline Casts, UA None Seen None Seen /LPF    Methodology Manual Light Microscopy    Green Top (Gel)   Result Value Ref Range    Extra Tube Hold for add-ons.    Lavender Top   Result Value Ref Range    Extra Tube hold for add-on    Light Blue Top   Result Value Ref Range    Extra Tube Hold for add-ons.           ED Course  ED Course as of 11/07/23 1547   Tue Nov 07, 2023   1301 XR Abdomen KUB  FINDINGS:    GASTROINTESTINAL TRACT:   Unremarkable as visualized.  No dilation.    ORGANS:  Probably other calculi noted of the right kidney.    BONES/JOINTS:  Unremarkable as visualized.    OTHER FINDINGS:  Right double-J ureteral stent. Right UPJ calculus is  no longer seen.     IMPRESSION:  1.  Right double-J ureteral stent. Right UPJ calculus is no longer seen.  2.  Probably other calculi noted of the right kidney.   [AH]   1405 Patient reports feeling better - attempting ice chips at this time. []      ED Course User Index  [] Zohra Austin, PA                                           Medical Decision Making  28-year-old female who presents to the ED today for nausea, vomiting and hematuria after having a lithotripsy with ureteral stent placement yesterday.  The patient was given IV fluids, Compazine and Dilaudid in the ED.  Her symptoms resolved and she was feeling much better.  She was able to tolerate a diet.  She will be able to be discharged home to follow-up outpatient with urology as scheduled.  A prescription for Phenergan suppositories was sent to her pharmacy in case she cannot tolerate the Zofran at home.  She was advised to return to the ED at any time if symptoms change or worsen.    Problems Addressed:  Hematuria, unspecified type: complicated acute illness or injury  Nausea and vomiting, unspecified vomiting type: complicated acute illness or injury    Amount and/or Complexity of Data Reviewed  Labs: ordered.  Radiology: ordered. Decision-making details documented in ED Course.    Risk  Prescription drug management.        Final diagnoses:   Nausea and vomiting, unspecified vomiting type   Hematuria, unspecified type       ED Disposition  ED Disposition       ED Disposition   Discharge    Condition   Stable    Comment   --               Farhad Allen MD  [address] [facility] [facility] [address]  [facility] [address]  [phone]    Go on 11/9/2023           Medication List        New Prescriptions      promethazine 25 MG  suppository  Commonly known as: PHENERGAN  Insert 1 suppository into the rectum Every 6 (Six) Hours As Needed for Nausea or Vomiting.               Where to Get Your Medications        These medications were sent to [facility] - [address] - [phone]  - [phone]   [address] [place] [address]      Phone: [phone]   promethazine 25 MG suppository            Zohra Austin PA  11/07/23 1547

## 2023-11-07 NOTE — DISCHARGE INSTRUCTIONS
Rest at home, make sure you are drinking plenty of fluids.  Follow-up with your urologist as scheduled.  A prescription for nausea medication has been sent to your pharmacy.  Return to the ED at any time if symptoms change or worsen.

## 2023-11-08 LAB — BACTERIA SPEC AEROBE CULT: NO GROWTH

## 2023-11-09 ENCOUNTER — OFFICE VISIT (OUTPATIENT)
Dept: UROLOGY | Facility: CLINIC | Age: 28
End: 2023-11-09
Payer: MEDICAID

## 2023-11-09 VITALS
HEIGHT: 62 IN | DIASTOLIC BLOOD PRESSURE: 80 MMHG | HEART RATE: 121 BPM | BODY MASS INDEX: 27.79 KG/M2 | SYSTOLIC BLOOD PRESSURE: 116 MMHG | WEIGHT: 151 LBS

## 2023-11-09 DIAGNOSIS — N20.1 RIGHT URETERAL STONE: ICD-10-CM

## 2023-11-09 PROCEDURE — 1160F RVW MEDS BY RX/DR IN RCRD: CPT | Performed by: UROLOGY

## 2023-11-09 PROCEDURE — 99213 OFFICE O/P EST LOW 20 MIN: CPT | Performed by: UROLOGY

## 2023-11-09 PROCEDURE — 1159F MED LIST DOCD IN RCRD: CPT | Performed by: UROLOGY

## 2023-11-09 RX ORDER — ONDANSETRON 4 MG/1
4 TABLET, FILM COATED ORAL DAILY PRN
Qty: 30 TABLET | Refills: 1 | Status: SHIPPED | OUTPATIENT
Start: 2023-11-09

## 2023-11-09 RX ORDER — HYDROCODONE BITARTRATE AND ACETAMINOPHEN 5; 325 MG/1; MG/1
1 TABLET ORAL EVERY 6 HOURS PRN
Qty: 10 TABLET | Refills: 0 | Status: SHIPPED | OUTPATIENT
Start: 2023-11-09

## 2023-11-09 NOTE — PROGRESS NOTES
Chief Complaint:      Chief Complaint   Patient presents with    Nephrolithiasis     Stent Removal        HPI:   28 y.o. female that is post a successful ureteroscopy KUB was reviewed there is 1 small calcification in upper lateral calyx.  I removed her stent via lanyard she tolerated it well.  I will plan to see her back in 2 months with a KUB to monitor metabolic activity.    Past Medical History:     Past Medical History:   Diagnosis Date    ADHD (attention deficit hyperactivity disorder)     Anxiety     Depression     Kidney stones        Current Meds:     Current Outpatient Medications   Medication Sig Dispense Refill    amphetamine-dextroamphetamine (Adderall) 20 MG tablet Take 1 tablet by mouth 2 (Two) Times a Day. 60 tablet 0    HYDROcodone-acetaminophen (NORCO) 5-325 MG per tablet Take 1 tablet by mouth Every 6 (Six) Hours As Needed for Severe Pain. 10 tablet 0    ondansetron (Zofran) 4 MG tablet Take 1 tablet by mouth Daily As Needed for Nausea or Vomiting. 30 tablet 1    promethazine (PHENERGAN) 25 MG suppository Insert 1 suppository into the rectum Every 6 (Six) Hours As Needed for Nausea or Vomiting. 12 suppository 0    tamsulosin (FLOMAX) 0.4 MG capsule 24 hr capsule Take 1 capsule by mouth Daily. 30 capsule 0     No current facility-administered medications for this visit.        Allergies:      Allergies   Allergen Reactions    Morphine Hives        Past Surgical History:     Past Surgical History:   Procedure Laterality Date    URETEROSCOPY STENT INSERTION Right 2023    Procedure: URETEROSCOPY LASER LITHOTRIPSY WITH STENT INSERTION;  Surgeon: Farhad Allen MD;  Location: [facility];  Service: Urology;  Laterality: Right;    WRIST SURGERY         Social History:     Social History     Socioeconomic History    Marital status:    Tobacco Use    Smoking status: Former     Packs/day: .5     Types: Cigarettes     Quit date:      Years since quittin.8    Smokeless tobacco:  Never   Vaping Use    Vaping Use: Every day   Substance and Sexual Activity    Alcohol use: No    Drug use: No    Sexual activity: Defer     Partners: Female     Birth control/protection: None       Family History:     Family History   Problem Relation Age of Onset    No Known Problems Father     No Known Problems Mother        Review of Systems:     Review of Systems   Constitutional: Negative.  Negative for activity change, appetite change, chills, diaphoresis, fatigue and unexpected weight change.   HENT:  Negative for congestion, dental problem, drooling, ear discharge, ear pain, facial swelling, hearing loss, mouth sores, nosebleeds, postnasal drip, rhinorrhea, sinus pressure, sneezing, sore throat, tinnitus, trouble swallowing and voice change.    Eyes: Negative.  Negative for photophobia, pain, discharge, redness, itching and visual disturbance.   Respiratory: Negative.  Negative for apnea, cough, choking, chest tightness, shortness of breath, wheezing and stridor.    Cardiovascular: Negative.  Negative for chest pain, palpitations and leg swelling.   Gastrointestinal: Negative.  Negative for abdominal distention, abdominal pain, anal bleeding, blood in stool, constipation, diarrhea, nausea, rectal pain and vomiting.   Endocrine: Negative.  Negative for cold intolerance, heat intolerance, polydipsia, polyphagia and polyuria.   Musculoskeletal: Negative.  Negative for arthralgias, back pain, gait problem, joint swelling, myalgias, neck pain and neck stiffness.   Skin: Negative.  Negative for color change, pallor, rash and wound.   Allergic/Immunologic: Negative.  Negative for environmental allergies, food allergies and immunocompromised state.   Neurological: Negative.  Negative for dizziness, tremors, seizures, syncope, facial asymmetry, speech difficulty, weakness, light-headedness, numbness and headaches.   Hematological: Negative.  Negative for adenopathy. Does not bruise/bleed easily.    Psychiatric/Behavioral:  Negative for agitation, behavioral problems, confusion, decreased concentration, dysphoric mood, hallucinations, self-injury, sleep disturbance and suicidal ideas. The patient is not nervous/anxious and is not hyperactive.    All other systems reviewed and are negative.      Physical Exam:     Physical Exam  Constitutional:       Appearance: She is well-developed.   HENT:      Head: Normocephalic and atraumatic.      Right Ear: External ear normal.      Left Ear: External ear normal.   Eyes:      Conjunctiva/sclera: Conjunctivae normal.      Pupils: Pupils are equal, round, and reactive to light.   Cardiovascular:      Rate and Rhythm: Normal rate and regular rhythm.      Heart sounds: Normal heart sounds.   Pulmonary:      Effort: Pulmonary effort is normal.      Breath sounds: Normal breath sounds.   Abdominal:      General: Bowel sounds are normal. There is no distension.      Palpations: Abdomen is soft. There is no mass.      Tenderness: There is no abdominal tenderness. There is no guarding or rebound.   Genitourinary:     Vagina: No vaginal discharge.   Musculoskeletal:         General: Normal range of motion.   Skin:     General: Skin is warm and dry.   Neurological:      Mental Status: She is alert.      Deep Tendon Reflexes: Reflexes are normal and symmetric.   Psychiatric:         Behavior: Behavior normal.         Thought Content: Thought content normal.         Judgment: Judgment normal.         I have reviewed the following portions of the patient's history: Allergies, current medications, past family history, past medical history, past social history, past surgical history, problem list, and ROS and confirm it is accurate.    Recent Image (CT and/or KUB):      CT Abdomen and Pelvis: No results found for this or any previous visit.       CT Stone Protocol: No results found for this or any previous visit.       KUB: Results for orders placed during the hospital encounter of  11/07/23    XR Abdomen KUB    Narrative  EXAM:  XR Abdomen, 1 View    EXAM DATE:  11/7/2023 1:21 PM    CLINICAL HISTORY:  right flank pain, hematuria, had ureteral stent placed yesterday    TECHNIQUE:  Frontal supine view of the abdomen/pelvis.    COMPARISON:  No relevant prior studies available.    FINDINGS:  GASTROINTESTINAL TRACT:  Unremarkable as visualized.  No dilation.  ORGANS:  Probably other calculi noted of the right kidney.  BONES/JOINTS:  Unremarkable as visualized.  OTHER FINDINGS:  Right double-J ureteral stent. Right UPJ calculus is  no longer seen.    Impression  1.  Right double-J ureteral stent. Right UPJ calculus is no longer seen.  2.  Probably other calculi noted of the right kidney.      This report was finalized on 11/7/2023 12:45 PM by Dr. Jules Eli MD.       Labs (past 3 months):      Admission on 11/07/2023, Discharged on 11/07/2023   Component Date Value Ref Range Status    Glucose 11/07/2023 90  65 - 99 mg/dL Final    BUN 11/07/2023 8  6 - 20 mg/dL Final    Creatinine 11/07/2023 0.88  0.57 - 1.00 mg/dL Final    Sodium 11/07/2023 138  136 - 145 mmol/L Final    Potassium 11/07/2023 3.7  3.5 - 5.2 mmol/L Final    Chloride 11/07/2023 102  98 - 107 mmol/L Final    CO2 11/07/2023 22.9  22.0 - 29.0 mmol/L Final    Calcium 11/07/2023 9.2  8.6 - 10.5 mg/dL Final    Total Protein 11/07/2023 7.3  6.0 - 8.5 g/dL Final    Albumin 11/07/2023 4.2  3.5 - 5.2 g/dL Final    ALT (SGPT) 11/07/2023 10  1 - 33 U/L Final    AST (SGOT) 11/07/2023 12  1 - 32 U/L Final    Alkaline Phosphatase 11/07/2023 43  39 - 117 U/L Final    Total Bilirubin 11/07/2023 0.5  0.0 - 1.2 mg/dL Final    Globulin 11/07/2023 3.1  gm/dL Final    A/G Ratio 11/07/2023 1.4  g/dL Final    BUN/Creatinine Ratio 11/07/2023 9.1  7.0 - 25.0 Final    Anion Gap 11/07/2023 13.1  5.0 - 15.0 mmol/L Final    eGFR 11/07/2023 91.4  >60.0 mL/min/1.73 Final    Lipase 11/07/2023 20  13 - 60 U/L Final    HCG Qualitative 11/07/2023 Negative  Negative  Final    Color, UA 11/07/2023 Orange (A)  Yellow, Straw Final    Appearance, UA 11/07/2023 Cloudy (A)  Clear Final    pH, UA 11/07/2023 6.0  5.0 - 8.0 Final    Specific [place], UA 11/07/2023 1.021  1.005 - 1.030 Final    Glucose, UA 11/07/2023 Negative  Negative Final    Ketones, UA 11/07/2023 Trace (A)  Negative Final    Bilirubin, UA 11/07/2023 Small (1+) (A)  Negative Final    Blood, UA 11/07/2023 Large (3+) (A)  Negative Final    Protein, UA 11/07/2023 >=300 mg/dL (3+) (A)  Negative Final    Leuk Esterase, UA 11/07/2023 Moderate (2+) (A)  Negative Final    Nitrite, UA 11/07/2023 Positive (A)  Negative Final    Urobilinogen, UA 11/07/2023 0.2 E.U./dL  0.2 - 1.0 E.U./dL Final    Blood Culture 11/07/2023 No growth at 24 hours   Preliminary    Blood Culture 11/07/2023 No growth at 24 hours   Preliminary    Lactate 11/07/2023 1.2  0.5 - 2.0 mmol/L Final    Procalcitonin 11/07/2023 0.03  0.00 - 0.25 ng/mL Final    Sed Rate 11/07/2023 15  0 - 20 mm/hr Final    C-Reactive Protein 11/07/2023 <0.30  0.00 - 0.50 mg/dL Final    Magnesium 11/07/2023 1.8  1.6 - 2.6 mg/dL Final    WBC 11/07/2023 13.34 (H)  3.40 - 10.80 10*3/mm3 Final    RBC 11/07/2023 4.76  3.77 - 5.28 10*6/mm3 Final    Hemoglobin 11/07/2023 14.7  12.0 - 15.9 g/dL Final    Hematocrit 11/07/2023 44.0  34.0 - 46.6 % Final    MCV 11/07/2023 92.4  79.0 - 97.0 fL Final    MCH 11/07/2023 30.9  26.6 - 33.0 pg Final    MCHC 11/07/2023 33.4  31.5 - 35.7 g/dL Final    RDW 11/07/2023 12.3  12.3 - 15.4 % Final    RDW-SD 11/07/2023 41.9  37.0 - 54.0 fl Final    MPV 11/07/2023 10.9  6.0 - 12.0 fL Final    Platelets 11/07/2023 404  140 - 450 10*3/mm3 Final    Neutrophil % 11/07/2023 73.2  42.7 - 76.0 % Final    Lymphocyte % 11/07/2023 17.3 (L)  19.6 - 45.3 % Final    Monocyte % 11/07/2023 7.6  5.0 - 12.0 % Final    Eosinophil % 11/07/2023 0.8  0.3 - 6.2 % Final    Basophil % 11/07/2023 0.7  0.0 - 1.5 % Final    Immature Grans % 11/07/2023 0.4  0.0 - 0.5 % Final     Neutrophils, Absolute 11/07/2023 9.75 (H)  1.70 - 7.00 10*3/mm3 Final    Lymphocytes, Absolute 11/07/2023 2.31  0.70 - 3.10 10*3/mm3 Final    Monocytes, Absolute 11/07/2023 1.02 (H)  0.10 - 0.90 10*3/mm3 Final    Eosinophils, Absolute 11/07/2023 0.11  0.00 - 0.40 10*3/mm3 Final    Basophils, Absolute 11/07/2023 0.09  0.00 - 0.20 10*3/mm3 Final    Immature Grans, Absolute 11/07/2023 0.06 (H)  0.00 - 0.05 10*3/mm3 Final    nRBC 11/07/2023 0.0  0.0 - 0.2 /100 WBC Final    Extra Tube 11/07/2023 Hold for add-ons.   Final    Auto resulted.    Extra Tube 11/07/2023 hold for add-on   Final    Auto resulted    Extra Tube 11/07/2023 Hold for add-ons.   Final    Auto resulted    RBC, UA 11/07/2023 Too Numerous to Count (A)  None Seen, 0-2 /HPF Final    WBC, UA 11/07/2023 11-20 (A)  None Seen, 0-2 /HPF Final    Bacteria, UA 11/07/2023 None Seen  None Seen /HPF Final    Squamous Epithelial Cells, UA 11/07/2023 0-2  None Seen, 0-2 /HPF Final    Hyaline Casts, UA 11/07/2023 None Seen  None Seen /LPF Final    Methodology 11/07/2023 Manual Light Microscopy   Final    Urine Culture 11/07/2023 No growth   Final   Admission on 11/05/2023, Discharged on 11/05/2023   Component Date Value Ref Range Status    Glucose 11/05/2023 101 (H)  65 - 99 mg/dL Final    BUN 11/05/2023 7  6 - 20 mg/dL Final    Creatinine 11/05/2023 0.82  0.57 - 1.00 mg/dL Final    Sodium 11/05/2023 141  136 - 145 mmol/L Final    Potassium 11/05/2023 4.4  3.5 - 5.2 mmol/L Final    Chloride 11/05/2023 103  98 - 107 mmol/L Final    CO2 11/05/2023 27.2  22.0 - 29.0 mmol/L Final    Calcium 11/05/2023 9.4  8.6 - 10.5 mg/dL Final    Total Protein 11/05/2023 7.2  6.0 - 8.5 g/dL Final    Albumin 11/05/2023 4.3  3.5 - 5.2 g/dL Final    ALT (SGPT) 11/05/2023 <5  1 - 33 U/L Final    AST (SGOT) 11/05/2023 19  1 - 32 U/L Final    Alkaline Phosphatase 11/05/2023 45  39 - 117 U/L Final    Total Bilirubin 11/05/2023 0.3  0.0 - 1.2 mg/dL Final    Globulin 11/05/2023 2.9  gm/dL Final     A/G Ratio 11/05/2023 1.5  g/dL Final    BUN/Creatinine Ratio 11/05/2023 8.5  7.0 - 25.0 Final    Anion Gap 11/05/2023 10.8  5.0 - 15.0 mmol/L Final    eGFR 11/05/2023 99.4  >60.0 mL/min/1.73 Final    Lipase 11/05/2023 32  13 - 60 U/L Final    HCG Qualitative 11/05/2023 Negative  Negative Final    Color, UA 11/05/2023 Yellow  Yellow, Straw Final    Appearance, UA 11/05/2023 Clear  Clear Final    pH, UA 11/05/2023 7.0  5.0 - 8.0 Final    Specific [place], UA 11/05/2023 1.012  1.005 - 1.030 Final    Glucose, UA 11/05/2023 Negative  Negative Final    Ketones, UA 11/05/2023 Trace (A)  Negative Final    Bilirubin, UA 11/05/2023 Negative  Negative Final    Blood, UA 11/05/2023 Negative  Negative Final    Protein, UA 11/05/2023 Negative  Negative Final    Leuk Esterase, UA 11/05/2023 Trace (A)  Negative Final    Nitrite, UA 11/05/2023 Negative  Negative Final    Urobilinogen, UA 11/05/2023 0.2 E.U./dL  0.2 - 1.0 E.U./dL Final    C-Reactive Protein 11/05/2023 <0.30  0.00 - 0.50 mg/dL Final    Sed Rate 11/05/2023 16  0 - 20 mm/hr Final    WBC 11/05/2023 11.66 (H)  3.40 - 10.80 10*3/mm3 Final    RBC 11/05/2023 4.65  3.77 - 5.28 10*6/mm3 Final    Hemoglobin 11/05/2023 14.5  12.0 - 15.9 g/dL Final    Hematocrit 11/05/2023 43.3  34.0 - 46.6 % Final    MCV 11/05/2023 93.1  79.0 - 97.0 fL Final    MCH 11/05/2023 31.2  26.6 - 33.0 pg Final    MCHC 11/05/2023 33.5  31.5 - 35.7 g/dL Final    RDW 11/05/2023 12.4  12.3 - 15.4 % Final    RDW-SD 11/05/2023 42.4  37.0 - 54.0 fl Final    MPV 11/05/2023 10.9  6.0 - 12.0 fL Final    Platelets 11/05/2023 380  140 - 450 10*3/mm3 Final    Neutrophil % 11/05/2023 74.4  42.7 - 76.0 % Final    Lymphocyte % 11/05/2023 16.5 (L)  19.6 - 45.3 % Final    Monocyte % 11/05/2023 7.8  5.0 - 12.0 % Final    Eosinophil % 11/05/2023 0.4  0.3 - 6.2 % Final    Basophil % 11/05/2023 0.6  0.0 - 1.5 % Final    Immature Grans % 11/05/2023 0.3  0.0 - 0.5 % Final    Neutrophils, Absolute 11/05/2023 8.68 (H)  1.70 -  7.00 10*3/mm3 Final    Lymphocytes, Absolute 11/05/2023 1.92  0.70 - 3.10 10*3/mm3 Final    Monocytes, Absolute 11/05/2023 0.91 (H)  0.10 - 0.90 10*3/mm3 Final    Eosinophils, Absolute 11/05/2023 0.05  0.00 - 0.40 10*3/mm3 Final    Basophils, Absolute 11/05/2023 0.07  0.00 - 0.20 10*3/mm3 Final    Immature Grans, Absolute 11/05/2023 0.03  0.00 - 0.05 10*3/mm3 Final    nRBC 11/05/2023 0.0  0.0 - 0.2 /100 WBC Final    Extra Tube 11/05/2023 Hold for add-ons.   Final    Auto resulted.    Extra Tube 11/05/2023 hold for add-on   Final    Auto resulted    Extra Tube 11/05/2023 Hold for add-ons.   Final    Auto resulted    RBC, UA 11/05/2023 3-5 (A)  None Seen, 0-2 /HPF Final    WBC, UA 11/05/2023 11-20 (A)  None Seen, 0-2 /HPF Final    Bacteria, UA 11/05/2023 None Seen  None Seen /HPF Final    Squamous Epithelial Cells, UA 11/05/2023 3-6 (A)  None Seen, 0-2 /HPF Final    Hyaline Casts, UA 11/05/2023 None Seen  None Seen /LPF Final    Methodology 11/05/2023 Automated Microscopy   Final    Urine Culture 11/05/2023 No growth   Final   Admission on 11/01/2023, Discharged on 11/01/2023   Component Date Value Ref Range Status    Glucose 11/01/2023 106 (H)  65 - 99 mg/dL Final    BUN 11/01/2023 14  6 - 20 mg/dL Final    Creatinine 11/01/2023 0.96  0.57 - 1.00 mg/dL Final    Sodium 11/01/2023 136  136 - 145 mmol/L Final    Potassium 11/01/2023 4.0  3.5 - 5.2 mmol/L Final    Chloride 11/01/2023 100  98 - 107 mmol/L Final    CO2 11/01/2023 24.2  22.0 - 29.0 mmol/L Final    Calcium 11/01/2023 9.3  8.6 - 10.5 mg/dL Final    Total Protein 11/01/2023 7.8  6.0 - 8.5 g/dL Final    Albumin 11/01/2023 4.4  3.5 - 5.2 g/dL Final    ALT (SGPT) 11/01/2023 10  1 - 33 U/L Final    AST (SGOT) 11/01/2023 11  1 - 32 U/L Final    Alkaline Phosphatase 11/01/2023 53  39 - 117 U/L Final    Total Bilirubin 11/01/2023 0.6  0.0 - 1.2 mg/dL Final    Globulin 11/01/2023 3.4  gm/dL Final    A/G Ratio 11/01/2023 1.3  g/dL Final    BUN/Creatinine Ratio  11/01/2023 14.6  7.0 - 25.0 Final    Anion Gap 11/01/2023 11.8  5.0 - 15.0 mmol/L Final    eGFR 11/01/2023 82.3  >60.0 mL/min/1.73 Final    Lipase 11/01/2023 33  13 - 60 U/L Final    Color, UA 11/01/2023 Yellow  Yellow, Straw Final    Appearance, UA 11/01/2023 Clear  Clear Final    pH, UA 11/01/2023 6.0  5.0 - 8.0 Final    Specific [place], UA 11/01/2023 1.022  1.005 - 1.030 Final    Glucose, UA 11/01/2023 Negative  Negative Final    Ketones, UA 11/01/2023 15 mg/dL (1+) (A)  Negative Final    Bilirubin, UA 11/01/2023 Negative  Negative Final    Blood, UA 11/01/2023 Moderate (2+) (A)  Negative Final    Protein, UA 11/01/2023 Trace (A)  Negative Final    Leuk Esterase, UA 11/01/2023 Moderate (2+) (A)  Negative Final    Nitrite, UA 11/01/2023 Negative  Negative Final    Urobilinogen, UA 11/01/2023 0.2 E.U./dL  0.2 - 1.0 E.U./dL Final    Lactate 11/01/2023 0.8  0.5 - 2.0 mmol/L Final    HCG Qualitative 11/01/2023 Negative  Negative Final    Extra Tube 11/01/2023 Hold for add-ons.   Final    Auto resulted.    Extra Tube 11/01/2023 hold for add-on   Final    Auto resulted    Extra Tube 11/01/2023 Hold for add-ons.   Final    Auto resulted    WBC 11/01/2023 9.44  3.40 - 10.80 10*3/mm3 Final    RBC 11/01/2023 5.05  3.77 - 5.28 10*6/mm3 Final    Hemoglobin 11/01/2023 15.6  12.0 - 15.9 g/dL Final    Hematocrit 11/01/2023 45.5  34.0 - 46.6 % Final    MCV 11/01/2023 90.1  79.0 - 97.0 fL Final    MCH 11/01/2023 30.9  26.6 - 33.0 pg Final    MCHC 11/01/2023 34.3  31.5 - 35.7 g/dL Final    RDW 11/01/2023 12.1 (L)  12.3 - 15.4 % Final    RDW-SD 11/01/2023 39.6  37.0 - 54.0 fl Final    MPV 11/01/2023 11.2  6.0 - 12.0 fL Final    Platelets 11/01/2023 412  140 - 450 10*3/mm3 Final    Neutrophil % 11/01/2023 61.8  42.7 - 76.0 % Final    Lymphocyte % 11/01/2023 25.7  19.6 - 45.3 % Final    Monocyte % 11/01/2023 9.5  5.0 - 12.0 % Final    Eosinophil % 11/01/2023 1.5  0.3 - 6.2 % Final    Basophil % 11/01/2023 1.2  0.0 - 1.5 % Final     Immature Grans % 11/01/2023 0.3  0.0 - 0.5 % Final    Neutrophils, Absolute 11/01/2023 5.83  1.70 - 7.00 10*3/mm3 Final    Lymphocytes, Absolute 11/01/2023 2.43  0.70 - 3.10 10*3/mm3 Final    Monocytes, Absolute 11/01/2023 0.90  0.10 - 0.90 10*3/mm3 Final    Eosinophils, Absolute 11/01/2023 0.14  0.00 - 0.40 10*3/mm3 Final    Basophils, Absolute 11/01/2023 0.11  0.00 - 0.20 10*3/mm3 Final    Immature Grans, Absolute 11/01/2023 0.03  0.00 - 0.05 10*3/mm3 Final    nRBC 11/01/2023 0.0  0.0 - 0.2 /100 WBC Final    RBC, UA 11/01/2023 11-20 (A)  None Seen, 0-2 /HPF Final    WBC, UA 11/01/2023 21-50 (A)  None Seen, 0-2 /HPF Final    Bacteria, UA 11/01/2023 None Seen  None Seen /HPF Final    Squamous Epithelial Cells, UA 11/01/2023 3-6 (A)  None Seen, 0-2 /HPF Final    Hyaline Casts, UA 11/01/2023 7-12  None Seen /LPF Final    Methodology 11/01/2023 Automated Microscopy   Final    Extra Tube 11/01/2023 Hold for add-ons.   Final    Auto resulted.        Procedure:       Assessment/Plan:   Ureteral calculus-patient has been diagnosed with a ureteral calculus.  We have discussed the various parameters regarding spontaneous passage including the notion that a 2 mm stone has a high likelihood of spontaneous passage versus a larger stone being caught up in the upper areas of the urinary tract.  We also discussed the medical management of stone disease and the use of medical expulsive therapy in the form of Flomax.  This is used in an off label setting.  We discussed the indicators for intervention including  absolute indicators such as sepsis and uncontrollable severe pain, as well as the relative indicators of moderate pain that is well-controlled with various analgesia.  I also talked about nonoperative management including ambulation and increasing fluids and hot tub as being an effective adjuncts in the treatment of a ureteral stone.  Had a very successful ureteroscopy stent was removed via lanyard I will see him back in 2  months to monitor metabolic activity.      This document has been electronically signed by MIKI LANGE MD November 9, 2023 09:56 EST    Dictated Utilizing Dragon Dictation: Part of this note may be an electronic transcription/translation of spoken language to printed text using the Dragon Dictation System.

## 2023-11-10 ENCOUNTER — HOSPITAL ENCOUNTER (EMERGENCY)
Facility: HOSPITAL | Age: 28
Discharge: HOME | End: 2023-11-10
Attending: STUDENT IN AN ORGANIZED HEALTH CARE EDUCATION/TRAINING PROGRAM
Payer: MEDICAID

## 2023-11-10 ENCOUNTER — APPOINTMENT (OUTPATIENT)
Dept: CT IMAGING | Facility: HOSPITAL | Age: 28
End: 2023-11-10
Payer: MEDICAID

## 2023-11-10 VITALS
HEART RATE: 111 BPM | OXYGEN SATURATION: 98 % | SYSTOLIC BLOOD PRESSURE: 127 MMHG | DIASTOLIC BLOOD PRESSURE: 87 MMHG | WEIGHT: 150 LBS | HEIGHT: 62 IN | TEMPERATURE: 98.5 F | RESPIRATION RATE: 17 BRPM | BODY MASS INDEX: 27.6 KG/M2

## 2023-11-10 DIAGNOSIS — N20.1 RIGHT URETERAL STONE: ICD-10-CM

## 2023-11-10 DIAGNOSIS — N20.1 URETEROLITHIASIS: Primary | ICD-10-CM

## 2023-11-10 LAB
ALBUMIN SERPL-MCNC: 4.1 G/DL (ref 3.5–5.2)
ALBUMIN/GLOB SERPL: 1.4 G/DL
ALP SERPL-CCNC: 40 U/L (ref 39–117)
ALT SERPL W P-5'-P-CCNC: 11 U/L (ref 1–33)
ANION GAP SERPL CALCULATED.3IONS-SCNC: 9.7 MMOL/L (ref 5–15)
AST SERPL-CCNC: 18 U/L (ref 1–32)
BACTERIA UR QL AUTO: ABNORMAL /HPF
BASOPHILS # BLD AUTO: 0.05 10*3/MM3 (ref 0–0.2)
BASOPHILS NFR BLD AUTO: 0.4 % (ref 0–1.5)
BILIRUB SERPL-MCNC: 0.3 MG/DL (ref 0–1.2)
BILIRUB UR QL STRIP: NEGATIVE
BUN SERPL-MCNC: 9 MG/DL (ref 6–20)
BUN/CREAT SERPL: 9.9 (ref 7–25)
CALCIUM SPEC-SCNC: 9 MG/DL (ref 8.6–10.5)
CHLORIDE SERPL-SCNC: 104 MMOL/L (ref 98–107)
CLARITY UR: ABNORMAL
CO2 SERPL-SCNC: 25.3 MMOL/L (ref 22–29)
COLOR UR: ABNORMAL
CREAT SERPL-MCNC: 0.91 MG/DL (ref 0.57–1)
CRP SERPL-MCNC: 0.93 MG/DL (ref 0–0.5)
D-LACTATE SERPL-SCNC: 0.6 MMOL/L (ref 0.5–2)
DEPRECATED RDW RBC AUTO: 43.8 FL (ref 37–54)
EGFRCR SERPLBLD CKD-EPI 2021: 87.8 ML/MIN/1.73
EOSINOPHIL # BLD AUTO: 0.06 10*3/MM3 (ref 0–0.4)
EOSINOPHIL NFR BLD AUTO: 0.4 % (ref 0.3–6.2)
ERYTHROCYTE [DISTWIDTH] IN BLOOD BY AUTOMATED COUNT: 12.7 % (ref 12.3–15.4)
ERYTHROCYTE [SEDIMENTATION RATE] IN BLOOD: 15 MM/HR (ref 0–20)
FLUAV RNA RESP QL NAA+PROBE: NOT DETECTED
FLUBV RNA ISLT QL NAA+PROBE: NOT DETECTED
GLOBULIN UR ELPH-MCNC: 3 GM/DL
GLUCOSE SERPL-MCNC: 123 MG/DL (ref 65–99)
GLUCOSE UR STRIP-MCNC: NEGATIVE MG/DL
HCG SERPL QL: NEGATIVE
HCT VFR BLD AUTO: 43.4 % (ref 34–46.6)
HGB BLD-MCNC: 14.1 G/DL (ref 12–15.9)
HGB UR QL STRIP.AUTO: ABNORMAL
HOLD SPECIMEN: NORMAL
HYALINE CASTS UR QL AUTO: ABNORMAL /LPF
IMM GRANULOCYTES # BLD AUTO: 0.07 10*3/MM3 (ref 0–0.05)
IMM GRANULOCYTES NFR BLD AUTO: 0.5 % (ref 0–0.5)
KETONES UR QL STRIP: ABNORMAL
LEUKOCYTE ESTERASE UR QL STRIP.AUTO: ABNORMAL
LIPASE SERPL-CCNC: 18 U/L (ref 13–60)
LYMPHOCYTES # BLD AUTO: 1 10*3/MM3 (ref 0.7–3.1)
LYMPHOCYTES NFR BLD AUTO: 7 % (ref 19.6–45.3)
MCH RBC QN AUTO: 30.5 PG (ref 26.6–33)
MCHC RBC AUTO-ENTMCNC: 32.5 G/DL (ref 31.5–35.7)
MCV RBC AUTO: 93.9 FL (ref 79–97)
MONOCYTES # BLD AUTO: 1.27 10*3/MM3 (ref 0.1–0.9)
MONOCYTES NFR BLD AUTO: 8.9 % (ref 5–12)
NEUTROPHILS NFR BLD AUTO: 11.77 10*3/MM3 (ref 1.7–7)
NEUTROPHILS NFR BLD AUTO: 82.8 % (ref 42.7–76)
NITRITE UR QL STRIP: NEGATIVE
NRBC BLD AUTO-RTO: 0 /100 WBC (ref 0–0.2)
PH UR STRIP.AUTO: >=9 [PH] (ref 5–8)
PLATELET # BLD AUTO: 339 10*3/MM3 (ref 140–450)
PMV BLD AUTO: 10.7 FL (ref 6–12)
POTASSIUM SERPL-SCNC: 4.1 MMOL/L (ref 3.5–5.2)
PROT SERPL-MCNC: 7.1 G/DL (ref 6–8.5)
PROT UR QL STRIP: NEGATIVE
RBC # BLD AUTO: 4.62 10*6/MM3 (ref 3.77–5.28)
RBC # UR STRIP: ABNORMAL /HPF
REF LAB TEST METHOD: ABNORMAL
SARS-COV-2 RNA RESP QL NAA+PROBE: NOT DETECTED
SODIUM SERPL-SCNC: 139 MMOL/L (ref 136–145)
SP GR UR STRIP: 1.01 (ref 1–1.03)
SQUAMOUS #/AREA URNS HPF: ABNORMAL /HPF
UROBILINOGEN UR QL STRIP: ABNORMAL
WBC # UR STRIP: ABNORMAL /HPF
WBC NRBC COR # BLD: 14.22 10*3/MM3 (ref 3.4–10.8)
WHOLE BLOOD HOLD COAG: NORMAL
WHOLE BLOOD HOLD SPECIMEN: NORMAL

## 2023-11-10 PROCEDURE — 25010000002 KETOROLAC TROMETHAMINE PER 15 MG: Performed by: NURSE PRACTITIONER

## 2023-11-10 PROCEDURE — 25010000002 PROCHLORPERAZINE 10 MG/2ML SOLUTION: Performed by: NURSE PRACTITIONER

## 2023-11-10 PROCEDURE — 87040 BLOOD CULTURE FOR BACTERIA: CPT | Performed by: NURSE PRACTITIONER

## 2023-11-10 PROCEDURE — 25810000003 SODIUM CHLORIDE 0.9 % SOLUTION: Performed by: NURSE PRACTITIONER

## 2023-11-10 PROCEDURE — 74176 CT ABD & PELVIS W/O CONTRAST: CPT

## 2023-11-10 PROCEDURE — 83690 ASSAY OF LIPASE: CPT | Performed by: NURSE PRACTITIONER

## 2023-11-10 PROCEDURE — 96375 TX/PRO/DX INJ NEW DRUG ADDON: CPT

## 2023-11-10 PROCEDURE — 96365 THER/PROPH/DIAG IV INF INIT: CPT

## 2023-11-10 PROCEDURE — 99284 EMERGENCY DEPT VISIT MOD MDM: CPT

## 2023-11-10 PROCEDURE — 87636 SARSCOV2 & INF A&B AMP PRB: CPT | Performed by: NURSE PRACTITIONER

## 2023-11-10 PROCEDURE — 84703 CHORIONIC GONADOTROPIN ASSAY: CPT | Performed by: NURSE PRACTITIONER

## 2023-11-10 PROCEDURE — 85652 RBC SED RATE AUTOMATED: CPT | Performed by: NURSE PRACTITIONER

## 2023-11-10 PROCEDURE — 74176 CT ABD & PELVIS W/O CONTRAST: CPT | Performed by: RADIOLOGY

## 2023-11-10 PROCEDURE — 87086 URINE CULTURE/COLONY COUNT: CPT | Performed by: NURSE PRACTITIONER

## 2023-11-10 PROCEDURE — 85025 COMPLETE CBC W/AUTO DIFF WBC: CPT | Performed by: NURSE PRACTITIONER

## 2023-11-10 PROCEDURE — 96376 TX/PRO/DX INJ SAME DRUG ADON: CPT

## 2023-11-10 PROCEDURE — 80053 COMPREHEN METABOLIC PANEL: CPT | Performed by: NURSE PRACTITIONER

## 2023-11-10 PROCEDURE — 83605 ASSAY OF LACTIC ACID: CPT | Performed by: NURSE PRACTITIONER

## 2023-11-10 PROCEDURE — 25010000002 CEFTRIAXONE PER 250 MG: Performed by: NURSE PRACTITIONER

## 2023-11-10 PROCEDURE — 81001 URINALYSIS AUTO W/SCOPE: CPT | Performed by: NURSE PRACTITIONER

## 2023-11-10 PROCEDURE — 25010000002 HYDROMORPHONE 1 MG/ML SOLUTION: Performed by: NURSE PRACTITIONER

## 2023-11-10 PROCEDURE — 86140 C-REACTIVE PROTEIN: CPT | Performed by: NURSE PRACTITIONER

## 2023-11-10 PROCEDURE — 96374 THER/PROPH/DIAG INJ IV PUSH: CPT

## 2023-11-10 PROCEDURE — 36415 COLL VENOUS BLD VENIPUNCTURE: CPT

## 2023-11-10 RX ORDER — SODIUM CHLORIDE 0.9 % (FLUSH) 0.9 %
10 SYRINGE (ML) INJECTION AS NEEDED
Status: DISCONTINUED | OUTPATIENT
Start: 2023-11-10 | End: 2023-11-10 | Stop reason: HOSPADM

## 2023-11-10 RX ORDER — OXYCODONE HYDROCHLORIDE AND ACETAMINOPHEN 5; 325 MG/1; MG/1
1 TABLET ORAL EVERY 6 HOURS PRN
Qty: 12 TABLET | Refills: 0 | Status: SHIPPED | OUTPATIENT
Start: 2023-11-10 | End: 2023-11-13 | Stop reason: SDUPTHER

## 2023-11-10 RX ORDER — KETOROLAC TROMETHAMINE 30 MG/ML
30 INJECTION, SOLUTION INTRAMUSCULAR; INTRAVENOUS ONCE
Status: COMPLETED | OUTPATIENT
Start: 2023-11-10 | End: 2023-11-10

## 2023-11-10 RX ORDER — PROCHLORPERAZINE EDISYLATE 5 MG/ML
5 INJECTION INTRAMUSCULAR; INTRAVENOUS ONCE
Status: COMPLETED | OUTPATIENT
Start: 2023-11-10 | End: 2023-11-10

## 2023-11-10 RX ADMIN — SODIUM CHLORIDE 1000 ML: 9 INJECTION, SOLUTION INTRAVENOUS at 04:48

## 2023-11-10 RX ADMIN — HYDROMORPHONE HYDROCHLORIDE 1 MG: 1 INJECTION, SOLUTION INTRAMUSCULAR; INTRAVENOUS; SUBCUTANEOUS at 08:54

## 2023-11-10 RX ADMIN — PROCHLORPERAZINE EDISYLATE 5 MG: 5 INJECTION INTRAMUSCULAR; INTRAVENOUS at 08:54

## 2023-11-10 RX ADMIN — CEFTRIAXONE 2000 MG: 2 INJECTION, POWDER, FOR SOLUTION INTRAMUSCULAR; INTRAVENOUS at 08:54

## 2023-11-10 RX ADMIN — PROCHLORPERAZINE EDISYLATE 5 MG: 5 INJECTION INTRAMUSCULAR; INTRAVENOUS at 04:49

## 2023-11-10 RX ADMIN — KETOROLAC TROMETHAMINE 30 MG: 30 INJECTION, SOLUTION INTRAMUSCULAR; INTRAVENOUS at 04:50

## 2023-11-10 NOTE — ED PROVIDER NOTES
Subjective   History of Present Illness  28-year-old female who presents to the ED today for flank pain, nausea and vomiting with hematuria.  She had a ureteroscopy laser lithotripsy with right ureteral stent placement by Dr. Allen on 11/6.  She states last night she started to have hematuria and then developed nausea and vomiting.  She states she is having lower abdominal pain that radiates into her back since stent placement without relief and worsening.  She tried to take Zofran and hydrocodone for her symptoms but could not keep it down.  She states she has not been able to keep anything down including sips of water.  She denies any fever.  She denies any diarrhea.  She denies any chest pain or shortness of breath.    History provided by:  Patient   used: No        Review of Systems   Constitutional: Negative.  Negative for fever.   HENT: Negative.     Respiratory: Negative.     Cardiovascular: Negative.  Negative for chest pain.   Gastrointestinal:  Positive for nausea and vomiting. Negative for abdominal pain.   Endocrine: Negative.    Genitourinary:  Positive for flank pain and hematuria. Negative for dysuria.   Skin: Negative.    Neurological: Negative.    Psychiatric/Behavioral: Negative.     All other systems reviewed and are negative.      Past Medical History:   Diagnosis Date    ADHD (attention deficit hyperactivity disorder)     Anxiety     Depression     Kidney stones        Allergies   Allergen Reactions    Morphine Hives       Past Surgical History:   Procedure Laterality Date    URETEROSCOPY STENT INSERTION Right 11/6/2023    Procedure: URETEROSCOPY LASER LITHOTRIPSY WITH STENT INSERTION;  Surgeon: Farhad Allen MD;  Location: [facility];  Service: Urology;  Laterality: Right;    WRIST SURGERY         Family History   Problem Relation Age of Onset    No Known Problems Father     No Known Problems Mother        Social History     Socioeconomic History    Marital  status:    Tobacco Use    Smoking status: Former     Packs/day: .5     Types: Cigarettes     Quit date:      Years since quittin.8    Smokeless tobacco: Never   Vaping Use    Vaping Use: Every day   Substance and Sexual Activity    Alcohol use: No    Drug use: No    Sexual activity: Defer     Partners: Female     Birth control/protection: None           Objective   Physical Exam  Vitals and nursing note reviewed.   Constitutional:       General: She is not in acute distress.     Appearance: She is well-developed. She is not diaphoretic.   HENT:      Head: Normocephalic and atraumatic.      Right Ear: External ear normal.      Left Ear: External ear normal.      Nose: Nose normal.   Eyes:      Conjunctiva/sclera: Conjunctivae normal.      Pupils: Pupils are equal, round, and reactive to light.   Neck:      Vascular: No JVD.      Trachea: No tracheal deviation.   Cardiovascular:      Rate and Rhythm: Normal rate and regular rhythm.      Heart sounds: Normal heart sounds. No murmur heard.  Pulmonary:      Effort: Pulmonary effort is normal. No respiratory distress.      Breath sounds: Normal breath sounds. No wheezing.   Abdominal:      General: Bowel sounds are normal.      Palpations: Abdomen is soft.      Tenderness: There is no abdominal tenderness.   Musculoskeletal:         General: No deformity. Normal range of motion.      Cervical back: Normal range of motion and neck supple.   Skin:     General: Skin is warm and dry.      Coloration: Skin is not pale.      Findings: No erythema or rash.   Neurological:      Mental Status: She is alert and oriented to person, place, and time.      Cranial Nerves: No cranial nerve deficit.   Psychiatric:         Behavior: Behavior normal.         Thought Content: Thought content normal.         Procedures           ED Course  ED Course as of 11/10/23 1005   Fri Nov 10, 2023   0455 WBC(!): 14.22 [SM]   0455 Sed Rate: 15 [SM]   0735 CT Abdomen Pelvis Stone  Protocol  IMPRESSION:     THE RIGHT URETEROPELVIC JUNCTION CALCULUS SEEN ON THE PRIOR STUDY ON  11/1/2023 IS NOW FRAGMENTED, WITH PORTIONS OF THE CALCULUS LOCATED IN  THE RENAL CALYCES, A 9 MM CHUNK AT THE URETEROPELVIC JUNCTION, AND FAINT  LINEAR CALCIFICATIONS IN THE RIGHT URETEROVESICAL JUNCTION PRODUCING  MODERATE HYDRONEPHROSIS.  THERE IS ALSO NEW PERINEPHRIC STRANDING.              This report was finalized on 11/10/2023 6:55 AM by Rani Proctor MD.      [SM]   0957 Discussed with FLAQUITA Kellogg. Advises that patient can be discharged  [LENKA]      ED Course User Index  [LENKA] Bashir Rapp, WARD  [SM] Aliyah Chávez APRN                                           Medical Decision Making  Amount and/or Complexity of Data Reviewed  Labs: ordered. Decision-making details documented in ED Course.  Radiology: ordered. Decision-making details documented in ED Course.    Risk  Prescription drug management.        Final diagnoses:   Ureterolithiasis   Right ureteral stone       ED Disposition  ED Disposition       ED Disposition   Discharge    Condition   Stable    Comment   --               Jules Baker, PA-C  [address] [facility]  [address]  [place] [address]  [phone]      Follow up in the office at 8:30 am Monday morning         Medication List        New Prescriptions      oxyCODONE-acetaminophen 5-325 MG per tablet  Commonly known as: PERCOCET  Take 1 tablet by mouth Every 6 (Six) Hours As Needed for Moderate Pain.               Where to Get Your Medications        These medications were sent to [facility] - [place], [place] - [address] [place] [address] - [phone]  - [phone]  [address] [facility] [address], [place] [address]      Phone: [phone]   oxyCODONE-acetaminophen 5-325 MG per tablet            Bashir Rapp, WARD  11/10/23 6632

## 2023-11-11 LAB — BACTERIA SPEC AEROBE CULT: NORMAL

## 2023-11-12 LAB
BACTERIA SPEC AEROBE CULT: NORMAL
BACTERIA SPEC AEROBE CULT: NORMAL

## 2023-11-13 ENCOUNTER — OFFICE VISIT (OUTPATIENT)
Dept: UROLOGY | Facility: CLINIC | Age: 28
End: 2023-11-13
Payer: MEDICAID

## 2023-11-13 VITALS
SYSTOLIC BLOOD PRESSURE: 124 MMHG | HEART RATE: 111 BPM | DIASTOLIC BLOOD PRESSURE: 89 MMHG | HEIGHT: 62 IN | BODY MASS INDEX: 28.3 KG/M2 | WEIGHT: 153.8 LBS

## 2023-11-13 DIAGNOSIS — N20.1 RIGHT URETERAL STONE: Primary | ICD-10-CM

## 2023-11-13 DIAGNOSIS — N20.1 URETEROLITHIASIS: ICD-10-CM

## 2023-11-13 PROCEDURE — 1160F RVW MEDS BY RX/DR IN RCRD: CPT | Performed by: UROLOGY

## 2023-11-13 PROCEDURE — 99213 OFFICE O/P EST LOW 20 MIN: CPT | Performed by: UROLOGY

## 2023-11-13 PROCEDURE — 1159F MED LIST DOCD IN RCRD: CPT | Performed by: UROLOGY

## 2023-11-13 RX ORDER — OXYCODONE HYDROCHLORIDE AND ACETAMINOPHEN 5; 325 MG/1; MG/1
1 TABLET ORAL EVERY 6 HOURS PRN
Qty: 20 TABLET | Refills: 0 | Status: SHIPPED | OUTPATIENT
Start: 2023-11-13

## 2023-11-13 NOTE — PROGRESS NOTES
Chief Complaint:      Chief Complaint   Patient presents with    Nephrolithiasis       HPI:   28 y.o. female returns today.  Status post a very successful lithotripsy and stent removal but started having significant flank pain she had a CT showing some fragments and some mild hydronephrosis that should easily pass.  We discussed this whether she has adequate pain medication.  We personally reviewed the films together.  I will see her back on Thursday, November 16 with RAMIN    Past Medical History:     Past Medical History:   Diagnosis Date    ADHD (attention deficit hyperactivity disorder)     Anxiety     Depression     Kidney stones        Current Meds:     Current Outpatient Medications   Medication Sig Dispense Refill    amphetamine-dextroamphetamine (Adderall) 20 MG tablet Take 1 tablet by mouth 2 (Two) Times a Day. 60 tablet 0    HYDROcodone-acetaminophen (NORCO) 5-325 MG per tablet Take 1 tablet by mouth Every 6 (Six) Hours As Needed for Severe Pain. 10 tablet 0    ondansetron (Zofran) 4 MG tablet Take 1 tablet by mouth Daily As Needed for Nausea or Vomiting. 30 tablet 1    oxyCODONE-acetaminophen (PERCOCET) 5-325 MG per tablet Take 1 tablet by mouth Every 6 (Six) Hours As Needed for Moderate Pain. 12 tablet 0    promethazine (PHENERGAN) 25 MG suppository Insert 1 suppository into the rectum Every 6 (Six) Hours As Needed for Nausea or Vomiting. 12 suppository 0    tamsulosin (FLOMAX) 0.4 MG capsule 24 hr capsule Take 1 capsule by mouth Daily. 30 capsule 0     No current facility-administered medications for this visit.        Allergies:      Allergies   Allergen Reactions    Morphine Hives        Past Surgical History:     Past Surgical History:   Procedure Laterality Date    URETEROSCOPY STENT INSERTION Right 11/6/2023    Procedure: URETEROSCOPY LASER LITHOTRIPSY WITH STENT INSERTION;  Surgeon: Farhad Allen MD;  Location: [facility];  Service: Urology;  Laterality: Right;    WRIST SURGERY          Social History:     Social History     Socioeconomic History    Marital status:    Tobacco Use    Smoking status: Former     Packs/day: .5     Types: Cigarettes     Quit date:      Years since quittin.8    Smokeless tobacco: Never   Vaping Use    Vaping Use: Every day   Substance and Sexual Activity    Alcohol use: No    Drug use: No    Sexual activity: Defer     Partners: Female     Birth control/protection: None       Family History:     Family History   Problem Relation Age of Onset    No Known Problems Father     No Known Problems Mother        Review of Systems:     Review of Systems   Constitutional: Negative.  Negative for activity change, appetite change, chills, diaphoresis, fatigue and unexpected weight change.   HENT:  Negative for congestion, dental problem, drooling, ear discharge, ear pain, facial swelling, hearing loss, mouth sores, nosebleeds, postnasal drip, rhinorrhea, sinus pressure, sneezing, sore throat, tinnitus, trouble swallowing and voice change.    Eyes: Negative.  Negative for photophobia, pain, discharge, redness, itching and visual disturbance.   Respiratory: Negative.  Negative for apnea, cough, choking, chest tightness, shortness of breath, wheezing and stridor.    Cardiovascular: Negative.  Negative for chest pain, palpitations and leg swelling.   Gastrointestinal: Negative.  Negative for abdominal distention, abdominal pain, anal bleeding, blood in stool, constipation, diarrhea, nausea, rectal pain and vomiting.   Endocrine: Negative.  Negative for cold intolerance, heat intolerance, polydipsia, polyphagia and polyuria.   Musculoskeletal: Negative.  Negative for arthralgias, back pain, gait problem, joint swelling, myalgias, neck pain and neck stiffness.   Skin: Negative.  Negative for color change, pallor, rash and wound.   Allergic/Immunologic: Negative.  Negative for environmental allergies, food allergies and immunocompromised state.   Neurological: Negative.   Negative for dizziness, tremors, seizures, syncope, facial asymmetry, speech difficulty, weakness, light-headedness, numbness and headaches.   Hematological: Negative.  Negative for adenopathy. Does not bruise/bleed easily.   Psychiatric/Behavioral:  Negative for agitation, behavioral problems, confusion, decreased concentration, dysphoric mood, hallucinations, self-injury, sleep disturbance and suicidal ideas. The patient is not nervous/anxious and is not hyperactive.    All other systems reviewed and are negative.      Physical Exam:     Physical Exam  Constitutional:       Appearance: She is well-developed.   HENT:      Head: Normocephalic and atraumatic.      Right Ear: External ear normal.      Left Ear: External ear normal.   Eyes:      Conjunctiva/sclera: Conjunctivae normal.      Pupils: Pupils are equal, round, and reactive to light.   Cardiovascular:      Rate and Rhythm: Normal rate and regular rhythm.      Heart sounds: Normal heart sounds.   Pulmonary:      Effort: Pulmonary effort is normal.      Breath sounds: Normal breath sounds.   Abdominal:      General: Bowel sounds are normal. There is no distension.      Palpations: Abdomen is soft. There is no mass.      Tenderness: There is no abdominal tenderness. There is no guarding or rebound.   Genitourinary:     Vagina: No vaginal discharge.   Musculoskeletal:         General: Normal range of motion.   Skin:     General: Skin is warm and dry.   Neurological:      Mental Status: She is alert.      Deep Tendon Reflexes: Reflexes are normal and symmetric.   Psychiatric:         Behavior: Behavior normal.         Thought Content: Thought content normal.         Judgment: Judgment normal.         I have reviewed the following portions of the patient's history: Allergies, current medications, past family history, past medical history, past social history, past surgical history, problem list, and ROS and confirm it is accurate.  Recent Image (CT and/or KUB):       CT Abdomen and Pelvis: No results found for this or any previous visit.       CT Stone Protocol: Results for orders placed during the hospital encounter of 11/10/23    CT Abdomen Pelvis Stone Protocol    Narrative  CLINICAL HISTORY: Sudden abdominal pain and back pain.    COMPARISON: 11/1/2023.    TECHNIQUE: Noncontrast CT of the abdomen and pelvis was obtained.  Multiplanar reformats were generated.  Multiplanar reformats were  generated. Limited exposure control, adjustment of the mA and/or KV  according to patient size or use of iterative reconstruction technique  was utilized.    FINDINGS:  No acute abnormality in the abdomen or pelvis.    Lung bases: clear.    Hepatobiliary: Cholelithiasis.  Normal liver..    Pancreas: normal.    Spleen: Few calcified granulomata scattered through the spleen.    Adrenals: normal.    Kidneys, ureters, bladder: Since the prior study, the calcification in  the right ureteropelvic junction has partially broken up, but there  remains moderate to severe right hydroureteronephrosis and new right  perinephric stranding.  Calculus fragments measuring up to 9 mm are seen  in the right ureteropelvic junction, and at least 2 linear fragmentary  calculi are located at the ureterovesical junction.  Also new are  several nonobstructing calculi layering in the calyces of the right  kidney.  The left kidney and ureter are normal.  Urinary bladder is  normal    Reproductive: normal.    Bowel: Moderate amount of stool throughout the colon.    Appendix: normal.    Peritoneum: normal, no free air or fluid.    Vascular: normal.    Lymph nodes: normal.    Musculoskeletal: normal.    Impression  THE RIGHT URETEROPELVIC JUNCTION CALCULUS SEEN ON THE PRIOR STUDY ON  11/1/2023 IS NOW FRAGMENTED, WITH PORTIONS OF THE CALCULUS LOCATED IN  THE RENAL CALYCES, A 9 MM CHUNK AT THE URETEROPELVIC JUNCTION, AND FAINT  LINEAR CALCIFICATIONS IN THE RIGHT URETEROVESICAL JUNCTION PRODUCING  MODERATE  HYDRONEPHROSIS.  THERE IS ALSO NEW PERINEPHRIC STRANDING.          This report was finalized on 11/10/2023 6:55 AM by Rani Proctor MD.       KUB: Results for orders placed during the hospital encounter of 11/07/23    XR Abdomen KUB    Narrative  EXAM:  XR Abdomen, 1 View    EXAM DATE:  11/7/2023 1:21 PM    CLINICAL HISTORY:  right flank pain, hematuria, had ureteral stent placed yesterday    TECHNIQUE:  Frontal supine view of the abdomen/pelvis.    COMPARISON:  No relevant prior studies available.    FINDINGS:  GASTROINTESTINAL TRACT:  Unremarkable as visualized.  No dilation.  ORGANS:  Probably other calculi noted of the right kidney.  BONES/JOINTS:  Unremarkable as visualized.  OTHER FINDINGS:  Right double-J ureteral stent. Right UPJ calculus is  no longer seen.    Impression  1.  Right double-J ureteral stent. Right UPJ calculus is no longer seen.  2.  Probably other calculi noted of the right kidney.      This report was finalized on 11/7/2023 12:45 PM by Dr. Jules Eli MD.       Labs (past 3 months):      Admission on 11/10/2023, Discharged on 11/10/2023   Component Date Value Ref Range Status    Glucose 11/10/2023 123 (H)  65 - 99 mg/dL Final    BUN 11/10/2023 9  6 - 20 mg/dL Final    Creatinine 11/10/2023 0.91  0.57 - 1.00 mg/dL Final    Sodium 11/10/2023 139  136 - 145 mmol/L Final    Potassium 11/10/2023 4.1  3.5 - 5.2 mmol/L Final    Slight hemolysis detected by analyzer. Result may be falsely elevated.    Chloride 11/10/2023 104  98 - 107 mmol/L Final    CO2 11/10/2023 25.3  22.0 - 29.0 mmol/L Final    Calcium 11/10/2023 9.0  8.6 - 10.5 mg/dL Final    Total Protein 11/10/2023 7.1  6.0 - 8.5 g/dL Final    Albumin 11/10/2023 4.1  3.5 - 5.2 g/dL Final    ALT (SGPT) 11/10/2023 11  1 - 33 U/L Final    AST (SGOT) 11/10/2023 18  1 - 32 U/L Final    Alkaline Phosphatase 11/10/2023 40  39 - 117 U/L Final    Total Bilirubin 11/10/2023 0.3  0.0 - 1.2 mg/dL Final    Globulin 11/10/2023 3.0  gm/dL Final    A/G  Ratio 11/10/2023 1.4  g/dL Final    BUN/Creatinine Ratio 11/10/2023 9.9  7.0 - 25.0 Final    Anion Gap 11/10/2023 9.7  5.0 - 15.0 mmol/L Final    eGFR 11/10/2023 87.8  >60.0 mL/min/1.73 Final    COVID19 11/10/2023 Not Detected  Not Detected - Ref. Range Final    Influenza A PCR 11/10/2023 Not Detected  Not Detected Final    Influenza B PCR 11/10/2023 Not Detected  Not Detected Final    Lipase 11/10/2023 18  13 - 60 U/L Final    HCG Qualitative 11/10/2023 Negative  Negative Final    Color, UA 11/10/2023 Other (A)  Yellow, Straw Final    pinkish    Appearance, UA 11/10/2023 Cloudy (A)  Clear Final    pH, UA 11/10/2023 >=9.0 (H)  5.0 - 8.0 Final    Specific Gravity, UA 11/10/2023 1.009  1.005 - 1.030 Final    Glucose, UA 11/10/2023 Negative  Negative Final    Ketones, UA 11/10/2023 15 mg/dL (1+) (A)  Negative Final    Bilirubin, UA 11/10/2023 Negative  Negative Final    Blood, UA 11/10/2023 Large (3+) (A)  Negative Final    Protein, UA 11/10/2023 Negative  Negative Final    Leuk Esterase, UA 11/10/2023 Small (1+) (A)  Negative Final    Nitrite, UA 11/10/2023 Negative  Negative Final    Urobilinogen, UA 11/10/2023 0.2 E.U./dL  0.2 - 1.0 E.U./dL Final    C-Reactive Protein 11/10/2023 0.93 (H)  0.00 - 0.50 mg/dL Final    Sed Rate 11/10/2023 15  0 - 20 mm/hr Final    WBC 11/10/2023 14.22 (H)  3.40 - 10.80 10*3/mm3 Final    RBC 11/10/2023 4.62  3.77 - 5.28 10*6/mm3 Final    Hemoglobin 11/10/2023 14.1  12.0 - 15.9 g/dL Final    Hematocrit 11/10/2023 43.4  34.0 - 46.6 % Final    MCV 11/10/2023 93.9  79.0 - 97.0 fL Final    MCH 11/10/2023 30.5  26.6 - 33.0 pg Final    MCHC 11/10/2023 32.5  31.5 - 35.7 g/dL Final    RDW 11/10/2023 12.7  12.3 - 15.4 % Final    RDW-SD 11/10/2023 43.8  37.0 - 54.0 fl Final    MPV 11/10/2023 10.7  6.0 - 12.0 fL Final    Platelets 11/10/2023 339  140 - 450 10*3/mm3 Final    Neutrophil % 11/10/2023 82.8 (H)  42.7 - 76.0 % Final    Lymphocyte % 11/10/2023 7.0 (L)  19.6 - 45.3 % Final    Monocyte %  11/10/2023 8.9  5.0 - 12.0 % Final    Eosinophil % 11/10/2023 0.4  0.3 - 6.2 % Final    Basophil % 11/10/2023 0.4  0.0 - 1.5 % Final    Immature Grans % 11/10/2023 0.5  0.0 - 0.5 % Final    Neutrophils, Absolute 11/10/2023 11.77 (H)  1.70 - 7.00 10*3/mm3 Final    Lymphocytes, Absolute 11/10/2023 1.00  0.70 - 3.10 10*3/mm3 Final    Monocytes, Absolute 11/10/2023 1.27 (H)  0.10 - 0.90 10*3/mm3 Final    Eosinophils, Absolute 11/10/2023 0.06  0.00 - 0.40 10*3/mm3 Final    Basophils, Absolute 11/10/2023 0.05  0.00 - 0.20 10*3/mm3 Final    Immature Grans, Absolute 11/10/2023 0.07 (H)  0.00 - 0.05 10*3/mm3 Final    nRBC 11/10/2023 0.0  0.0 - 0.2 /100 WBC Final    Extra Tube 11/10/2023 Hold for add-ons.   Final    Auto resulted.    Extra Tube 11/10/2023 hold for add-on   Final    Auto resulted    Extra Tube 11/10/2023 Hold for add-ons.   Final    Auto resulted    Lactate 11/10/2023 0.6  0.5 - 2.0 mmol/L Final    Blood Culture 11/10/2023 No growth at 3 days   Preliminary    Blood Culture 11/10/2023 No growth at 3 days   Preliminary    RBC, UA 11/10/2023 Too Numerous to Count (A)  None Seen, 0-2 /HPF Final    WBC, UA 11/10/2023 21-50 (A)  None Seen, 0-2 /HPF Final    Bacteria, UA 11/10/2023 None Seen  None Seen /HPF Final    Squamous Epithelial Cells, UA 11/10/2023 0-2  None Seen, 0-2 /HPF Final    Hyaline Casts, UA 11/10/2023 None Seen  None Seen /LPF Final    Methodology 11/10/2023 Automated Microscopy   Final    Urine Culture 11/10/2023 25,000 CFU/mL Mixed [place] Isolated   Final   Admission on 11/07/2023, Discharged on 11/07/2023   Component Date Value Ref Range Status    Glucose 11/07/2023 90  65 - 99 mg/dL Final    BUN 11/07/2023 8  6 - 20 mg/dL Final    Creatinine 11/07/2023 0.88  0.57 - 1.00 mg/dL Final    Sodium 11/07/2023 138  136 - 145 mmol/L Final    Potassium 11/07/2023 3.7  3.5 - 5.2 mmol/L Final    Chloride 11/07/2023 102  98 - 107 mmol/L Final    CO2 11/07/2023 22.9  22.0 - 29.0 mmol/L Final    Calcium  11/07/2023 9.2  8.6 - 10.5 mg/dL Final    Total Protein 11/07/2023 7.3  6.0 - 8.5 g/dL Final    Albumin 11/07/2023 4.2  3.5 - 5.2 g/dL Final    ALT (SGPT) 11/07/2023 10  1 - 33 U/L Final    AST (SGOT) 11/07/2023 12  1 - 32 U/L Final    Alkaline Phosphatase 11/07/2023 43  39 - 117 U/L Final    Total Bilirubin 11/07/2023 0.5  0.0 - 1.2 mg/dL Final    Globulin 11/07/2023 3.1  gm/dL Final    A/G Ratio 11/07/2023 1.4  g/dL Final    BUN/Creatinine Ratio 11/07/2023 9.1  7.0 - 25.0 Final    Anion Gap 11/07/2023 13.1  5.0 - 15.0 mmol/L Final    eGFR 11/07/2023 91.4  >60.0 mL/min/1.73 Final    Lipase 11/07/2023 20  13 - 60 U/L Final    HCG Qualitative 11/07/2023 Negative  Negative Final    Color, UA 11/07/2023 Orange (A)  Yellow, Straw Final    Appearance, UA 11/07/2023 Cloudy (A)  Clear Final    pH, UA 11/07/2023 6.0  5.0 - 8.0 Final    Specific [place], UA 11/07/2023 1.021  1.005 - 1.030 Final    Glucose, UA 11/07/2023 Negative  Negative Final    Ketones, UA 11/07/2023 Trace (A)  Negative Final    Bilirubin, UA 11/07/2023 Small (1+) (A)  Negative Final    Blood, UA 11/07/2023 Large (3+) (A)  Negative Final    Protein, UA 11/07/2023 >=300 mg/dL (3+) (A)  Negative Final    Leuk Esterase, UA 11/07/2023 Moderate (2+) (A)  Negative Final    Nitrite, UA 11/07/2023 Positive (A)  Negative Final    Urobilinogen, UA 11/07/2023 0.2 E.U./dL  0.2 - 1.0 E.U./dL Final    Blood Culture 11/07/2023 No growth at 5 days   Final    Blood Culture 11/07/2023 No growth at 5 days   Final    Lactate 11/07/2023 1.2  0.5 - 2.0 mmol/L Final    Procalcitonin 11/07/2023 0.03  0.00 - 0.25 ng/mL Final    Sed Rate 11/07/2023 15  0 - 20 mm/hr Final    C-Reactive Protein 11/07/2023 <0.30  0.00 - 0.50 mg/dL Final    Magnesium 11/07/2023 1.8  1.6 - 2.6 mg/dL Final    WBC 11/07/2023 13.34 (H)  3.40 - 10.80 10*3/mm3 Final    RBC 11/07/2023 4.76  3.77 - 5.28 10*6/mm3 Final    Hemoglobin 11/07/2023 14.7  12.0 - 15.9 g/dL Final    Hematocrit 11/07/2023 44.0  34.0 -  46.6 % Final    MCV 11/07/2023 92.4  79.0 - 97.0 fL Final    MCH 11/07/2023 30.9  26.6 - 33.0 pg Final    MCHC 11/07/2023 33.4  31.5 - 35.7 g/dL Final    RDW 11/07/2023 12.3  12.3 - 15.4 % Final    RDW-SD 11/07/2023 41.9  37.0 - 54.0 fl Final    MPV 11/07/2023 10.9  6.0 - 12.0 fL Final    Platelets 11/07/2023 404  140 - 450 10*3/mm3 Final    Neutrophil % 11/07/2023 73.2  42.7 - 76.0 % Final    Lymphocyte % 11/07/2023 17.3 (L)  19.6 - 45.3 % Final    Monocyte % 11/07/2023 7.6  5.0 - 12.0 % Final    Eosinophil % 11/07/2023 0.8  0.3 - 6.2 % Final    Basophil % 11/07/2023 0.7  0.0 - 1.5 % Final    Immature Grans % 11/07/2023 0.4  0.0 - 0.5 % Final    Neutrophils, Absolute 11/07/2023 9.75 (H)  1.70 - 7.00 10*3/mm3 Final    Lymphocytes, Absolute 11/07/2023 2.31  0.70 - 3.10 10*3/mm3 Final    Monocytes, Absolute 11/07/2023 1.02 (H)  0.10 - 0.90 10*3/mm3 Final    Eosinophils, Absolute 11/07/2023 0.11  0.00 - 0.40 10*3/mm3 Final    Basophils, Absolute 11/07/2023 0.09  0.00 - 0.20 10*3/mm3 Final    Immature Grans, Absolute 11/07/2023 0.06 (H)  0.00 - 0.05 10*3/mm3 Final    nRBC 11/07/2023 0.0  0.0 - 0.2 /100 WBC Final    Extra Tube 11/07/2023 Hold for add-ons.   Final    Auto resulted.    Extra Tube 11/07/2023 hold for add-on   Final    Auto resulted    Extra Tube 11/07/2023 Hold for add-ons.   Final    Auto resulted    RBC, UA 11/07/2023 Too Numerous to Count (A)  None Seen, 0-2 /HPF Final    WBC, UA 11/07/2023 11-20 (A)  None Seen, 0-2 /HPF Final    Bacteria, UA 11/07/2023 None Seen  None Seen /HPF Final    Squamous Epithelial Cells, UA 11/07/2023 0-2  None Seen, 0-2 /HPF Final    Hyaline Casts, UA 11/07/2023 None Seen  None Seen /LPF Final    Methodology 11/07/2023 Manual Light Microscopy   Final    Urine Culture 11/07/2023 No growth   Final   Admission on 11/05/2023, Discharged on 11/05/2023   Component Date Value Ref Range Status    Glucose 11/05/2023 101 (H)  65 - 99 mg/dL Final    BUN 11/05/2023 7  6 - 20 mg/dL Final     Creatinine 11/05/2023 0.82  0.57 - 1.00 mg/dL Final    Sodium 11/05/2023 141  136 - 145 mmol/L Final    Potassium 11/05/2023 4.4  3.5 - 5.2 mmol/L Final    Chloride 11/05/2023 103  98 - 107 mmol/L Final    CO2 11/05/2023 27.2  22.0 - 29.0 mmol/L Final    Calcium 11/05/2023 9.4  8.6 - 10.5 mg/dL Final    Total Protein 11/05/2023 7.2  6.0 - 8.5 g/dL Final    Albumin 11/05/2023 4.3  3.5 - 5.2 g/dL Final    ALT (SGPT) 11/05/2023 <5  1 - 33 U/L Final    AST (SGOT) 11/05/2023 19  1 - 32 U/L Final    Alkaline Phosphatase 11/05/2023 45  39 - 117 U/L Final    Total Bilirubin 11/05/2023 0.3  0.0 - 1.2 mg/dL Final    Globulin 11/05/2023 2.9  gm/dL Final    A/G Ratio 11/05/2023 1.5  g/dL Final    BUN/Creatinine Ratio 11/05/2023 8.5  7.0 - 25.0 Final    Anion Gap 11/05/2023 10.8  5.0 - 15.0 mmol/L Final    eGFR 11/05/2023 99.4  >60.0 mL/min/1.73 Final    Lipase 11/05/2023 32  13 - 60 U/L Final    HCG Qualitative 11/05/2023 Negative  Negative Final    Color, UA 11/05/2023 Yellow  Yellow, Straw Final    Appearance, UA 11/05/2023 Clear  Clear Final    pH, UA 11/05/2023 7.0  5.0 - 8.0 Final    Specific [place], UA 11/05/2023 1.012  1.005 - 1.030 Final    Glucose, UA 11/05/2023 Negative  Negative Final    Ketones, UA 11/05/2023 Trace (A)  Negative Final    Bilirubin, UA 11/05/2023 Negative  Negative Final    Blood, UA 11/05/2023 Negative  Negative Final    Protein, UA 11/05/2023 Negative  Negative Final    Leuk Esterase, UA 11/05/2023 Trace (A)  Negative Final    Nitrite, UA 11/05/2023 Negative  Negative Final    Urobilinogen, UA 11/05/2023 0.2 E.U./dL  0.2 - 1.0 E.U./dL Final    C-Reactive Protein 11/05/2023 <0.30  0.00 - 0.50 mg/dL Final    Sed Rate 11/05/2023 16  0 - 20 mm/hr Final    WBC 11/05/2023 11.66 (H)  3.40 - 10.80 10*3/mm3 Final    RBC 11/05/2023 4.65  3.77 - 5.28 10*6/mm3 Final    Hemoglobin 11/05/2023 14.5  12.0 - 15.9 g/dL Final    Hematocrit 11/05/2023 43.3  34.0 - 46.6 % Final    MCV 11/05/2023 93.1  79.0 - 97.0  fL Final    MCH 11/05/2023 31.2  26.6 - 33.0 pg Final    MCHC 11/05/2023 33.5  31.5 - 35.7 g/dL Final    RDW 11/05/2023 12.4  12.3 - 15.4 % Final    RDW-SD 11/05/2023 42.4  37.0 - 54.0 fl Final    MPV 11/05/2023 10.9  6.0 - 12.0 fL Final    Platelets 11/05/2023 380  140 - 450 10*3/mm3 Final    Neutrophil % 11/05/2023 74.4  42.7 - 76.0 % Final    Lymphocyte % 11/05/2023 16.5 (L)  19.6 - 45.3 % Final    Monocyte % 11/05/2023 7.8  5.0 - 12.0 % Final    Eosinophil % 11/05/2023 0.4  0.3 - 6.2 % Final    Basophil % 11/05/2023 0.6  0.0 - 1.5 % Final    Immature Grans % 11/05/2023 0.3  0.0 - 0.5 % Final    Neutrophils, Absolute 11/05/2023 8.68 (H)  1.70 - 7.00 10*3/mm3 Final    Lymphocytes, Absolute 11/05/2023 1.92  0.70 - 3.10 10*3/mm3 Final    Monocytes, Absolute 11/05/2023 0.91 (H)  0.10 - 0.90 10*3/mm3 Final    Eosinophils, Absolute 11/05/2023 0.05  0.00 - 0.40 10*3/mm3 Final    Basophils, Absolute 11/05/2023 0.07  0.00 - 0.20 10*3/mm3 Final    Immature Grans, Absolute 11/05/2023 0.03  0.00 - 0.05 10*3/mm3 Final    nRBC 11/05/2023 0.0  0.0 - 0.2 /100 WBC Final    Extra Tube 11/05/2023 Hold for add-ons.   Final    Auto resulted.    Extra Tube 11/05/2023 hold for add-on   Final    Auto resulted    Extra Tube 11/05/2023 Hold for add-ons.   Final    Auto resulted    RBC, UA 11/05/2023 3-5 (A)  None Seen, 0-2 /HPF Final    WBC, UA 11/05/2023 11-20 (A)  None Seen, 0-2 /HPF Final    Bacteria, UA 11/05/2023 None Seen  None Seen /HPF Final    Squamous Epithelial Cells, UA 11/05/2023 3-6 (A)  None Seen, 0-2 /HPF Final    Hyaline Casts, UA 11/05/2023 None Seen  None Seen /LPF Final    Methodology 11/05/2023 Automated Microscopy   Final    Urine Culture 11/05/2023 No growth   Final   Admission on 11/01/2023, Discharged on 11/01/2023   Component Date Value Ref Range Status    Glucose 11/01/2023 106 (H)  65 - 99 mg/dL Final    BUN 11/01/2023 14  6 - 20 mg/dL Final    Creatinine 11/01/2023 0.96  0.57 - 1.00 mg/dL Final    Sodium  11/01/2023 136  136 - 145 mmol/L Final    Potassium 11/01/2023 4.0  3.5 - 5.2 mmol/L Final    Chloride 11/01/2023 100  98 - 107 mmol/L Final    CO2 11/01/2023 24.2  22.0 - 29.0 mmol/L Final    Calcium 11/01/2023 9.3  8.6 - 10.5 mg/dL Final    Total Protein 11/01/2023 7.8  6.0 - 8.5 g/dL Final    Albumin 11/01/2023 4.4  3.5 - 5.2 g/dL Final    ALT (SGPT) 11/01/2023 10  1 - 33 U/L Final    AST (SGOT) 11/01/2023 11  1 - 32 U/L Final    Alkaline Phosphatase 11/01/2023 53  39 - 117 U/L Final    Total Bilirubin 11/01/2023 0.6  0.0 - 1.2 mg/dL Final    Globulin 11/01/2023 3.4  gm/dL Final    A/G Ratio 11/01/2023 1.3  g/dL Final    BUN/Creatinine Ratio 11/01/2023 14.6  7.0 - 25.0 Final    Anion Gap 11/01/2023 11.8  5.0 - 15.0 mmol/L Final    eGFR 11/01/2023 82.3  >60.0 mL/min/1.73 Final    Lipase 11/01/2023 33  13 - 60 U/L Final    Color, UA 11/01/2023 Yellow  Yellow, Straw Final    Appearance, UA 11/01/2023 Clear  Clear Final    pH, UA 11/01/2023 6.0  5.0 - 8.0 Final    Specific [place], UA 11/01/2023 1.022  1.005 - 1.030 Final    Glucose, UA 11/01/2023 Negative  Negative Final    Ketones, UA 11/01/2023 15 mg/dL (1+) (A)  Negative Final    Bilirubin, UA 11/01/2023 Negative  Negative Final    Blood, UA 11/01/2023 Moderate (2+) (A)  Negative Final    Protein, UA 11/01/2023 Trace (A)  Negative Final    Leuk Esterase, UA 11/01/2023 Moderate (2+) (A)  Negative Final    Nitrite, UA 11/01/2023 Negative  Negative Final    Urobilinogen, UA 11/01/2023 0.2 E.U./dL  0.2 - 1.0 E.U./dL Final    Lactate 11/01/2023 0.8  0.5 - 2.0 mmol/L Final    HCG Qualitative 11/01/2023 Negative  Negative Final    Extra Tube 11/01/2023 Hold for add-ons.   Final    Auto resulted.    Extra Tube 11/01/2023 hold for add-on   Final    Auto resulted    Extra Tube 11/01/2023 Hold for add-ons.   Final    Auto resulted    WBC 11/01/2023 9.44  3.40 - 10.80 10*3/mm3 Final    RBC 11/01/2023 5.05  3.77 - 5.28 10*6/mm3 Final    Hemoglobin 11/01/2023 15.6  12.0 - 15.9  g/dL Final    Hematocrit 11/01/2023 45.5  34.0 - 46.6 % Final    MCV 11/01/2023 90.1  79.0 - 97.0 fL Final    MCH 11/01/2023 30.9  26.6 - 33.0 pg Final    MCHC 11/01/2023 34.3  31.5 - 35.7 g/dL Final    RDW 11/01/2023 12.1 (L)  12.3 - 15.4 % Final    RDW-SD 11/01/2023 39.6  37.0 - 54.0 fl Final    MPV 11/01/2023 11.2  6.0 - 12.0 fL Final    Platelets 11/01/2023 412  140 - 450 10*3/mm3 Final    Neutrophil % 11/01/2023 61.8  42.7 - 76.0 % Final    Lymphocyte % 11/01/2023 25.7  19.6 - 45.3 % Final    Monocyte % 11/01/2023 9.5  5.0 - 12.0 % Final    Eosinophil % 11/01/2023 1.5  0.3 - 6.2 % Final    Basophil % 11/01/2023 1.2  0.0 - 1.5 % Final    Immature Grans % 11/01/2023 0.3  0.0 - 0.5 % Final    Neutrophils, Absolute 11/01/2023 5.83  1.70 - 7.00 10*3/mm3 Final    Lymphocytes, Absolute 11/01/2023 2.43  0.70 - 3.10 10*3/mm3 Final    Monocytes, Absolute 11/01/2023 0.90  0.10 - 0.90 10*3/mm3 Final    Eosinophils, Absolute 11/01/2023 0.14  0.00 - 0.40 10*3/mm3 Final    Basophils, Absolute 11/01/2023 0.11  0.00 - 0.20 10*3/mm3 Final    Immature Grans, Absolute 11/01/2023 0.03  0.00 - 0.05 10*3/mm3 Final    nRBC 11/01/2023 0.0  0.0 - 0.2 /100 WBC Final    RBC, UA 11/01/2023 11-20 (A)  None Seen, 0-2 /HPF Final    WBC, UA 11/01/2023 21-50 (A)  None Seen, 0-2 /HPF Final    Bacteria, UA 11/01/2023 None Seen  None Seen /HPF Final    Squamous Epithelial Cells, UA 11/01/2023 3-6 (A)  None Seen, 0-2 /HPF Final    Hyaline Casts, UA 11/01/2023 7-12  None Seen /LPF Final    Methodology 11/01/2023 Automated Microscopy   Final    Extra Tube 11/01/2023 Hold for add-ons.   Final    Auto resulted.        Procedure:       Assessment/Plan:   Ureteral calculus-patient has been diagnosed with a ureteral calculus.  We have discussed the various parameters regarding spontaneous passage including the notion that a 2 mm stone has a high likelihood of spontaneous passage versus a larger stone being caught up in the upper areas of the urinary  tract.  We also discussed the medical management of stone disease and the use of medical expulsive therapy in the form of Flomax.  This is used in an off label setting.  We discussed the indicators for intervention including  absolute indicators such as sepsis and uncontrollable severe pain, as well as the relative indicators of moderate pain that is well-controlled with various analgesia.  I also talked about nonoperative management including ambulation and increasing fluids and hot tub as being an effective adjuncts in the treatment of a ureteral stone.  Narcotic pain medication-patient has significant acute pain that I believe would be an indication for the use of narcotic pain medication.  I discussed the significant risks of pain medication and the fact that this will be a short only option and I will give her no more than a three-day supply of pain medication, I will not plan long-term medication, and that this will be sent to a pain clinic if it at all becomes necessary.  We discussed signing a pain medication agreement and the fact that we're going to run a state JOSE review to be sure the patient is not getting pain medication from elsewhere.  If this is the case, we will not give pain medication as part of the patient's treatment plan of there being prescribed a controlled substance with potential for abuse.  This patient has been well aware of the appropriate dose of such medications including the risks for somnolence, limited ability to drive and/or safety and the significant potential for overdose.  It has been made clear that these medications are for the prescribed patient only without concomitant use of alcohol or other substance unless prescribed by the medical provider.  Has completed prescribing agreement detailing the terms of continue prescribing him a controlled substance including monitoring Jose reports, the possibility of urine drug screens, and pill counts.  The patient is aware that we  review JOSE reports on a regular basis and scan them into the chart.  History and physical examination exhibited continued safe and appropriate use of controlled substances. We also discussed the fact that the new [place] legislation allows only a three-day prescription for pain medication.  In this situation he will be referred to a chronic pain clinic.            This document has been electronically signed by MIKI LANGE MD November 13, 2023 13:19 EST    Dictated Utilizing Dragon Dictation: Part of this note may be an electronic transcription/translation of spoken language to printed text using the Dragon Dictation System.

## 2023-11-15 LAB
BACTERIA SPEC AEROBE CULT: NORMAL
BACTERIA SPEC AEROBE CULT: NORMAL

## 2024-06-26 ENCOUNTER — HOSPITAL ENCOUNTER (OUTPATIENT)
Facility: HOSPITAL | Age: 29
Discharge: HOME | End: 2024-06-26
Attending: OBSTETRICS & GYNECOLOGY | Admitting: OBSTETRICS & GYNECOLOGY
Payer: MEDICAID

## 2024-06-26 VITALS
HEIGHT: 62 IN | HEART RATE: 104 BPM | WEIGHT: 168.1 LBS | DIASTOLIC BLOOD PRESSURE: 81 MMHG | BODY MASS INDEX: 30.93 KG/M2 | SYSTOLIC BLOOD PRESSURE: 129 MMHG | RESPIRATION RATE: 18 BRPM | TEMPERATURE: 98 F

## 2024-06-26 PROBLEM — Z34.90 PREGNANCY: Status: ACTIVE | Noted: 2024-06-26

## 2024-06-26 LAB
BACTERIA UR QL AUTO: ABNORMAL /HPF
BILIRUB UR QL STRIP: NEGATIVE
CLARITY UR: CLEAR
COD CRY URNS QL: ABNORMAL /HPF
COLOR UR: YELLOW
DEPRECATED RDW RBC AUTO: 43.7 FL (ref 37–54)
ERYTHROCYTE [DISTWIDTH] IN BLOOD BY AUTOMATED COUNT: 13.2 % (ref 12.3–15.4)
GLUCOSE UR STRIP-MCNC: NEGATIVE MG/DL
HCT VFR BLD AUTO: 37 % (ref 34–46.6)
HGB BLD-MCNC: 12.1 G/DL (ref 12–15.9)
HGB UR QL STRIP.AUTO: ABNORMAL
HYALINE CASTS UR QL AUTO: ABNORMAL /LPF
KETONES UR QL STRIP: NEGATIVE
LEUKOCYTE ESTERASE UR QL STRIP.AUTO: NEGATIVE
MCH RBC QN AUTO: 30 PG (ref 26.6–33)
MCHC RBC AUTO-ENTMCNC: 32.7 G/DL (ref 31.5–35.7)
MCV RBC AUTO: 91.6 FL (ref 79–97)
NITRITE UR QL STRIP: NEGATIVE
PH UR STRIP.AUTO: 6.5 [PH] (ref 5–8)
PLATELET # BLD AUTO: 341 10*3/MM3 (ref 140–450)
PMV BLD AUTO: 11.4 FL (ref 6–12)
PROT UR QL STRIP: NEGATIVE
RBC # BLD AUTO: 4.04 10*6/MM3 (ref 3.77–5.28)
RBC # UR STRIP: ABNORMAL /HPF
REF LAB TEST METHOD: ABNORMAL
SP GR UR STRIP: 1.02 (ref 1–1.03)
SQUAMOUS #/AREA URNS HPF: ABNORMAL /HPF
TRANS CELLS #/AREA URNS HPF: ABNORMAL /HPF
UROBILINOGEN UR QL STRIP: ABNORMAL
WBC # UR STRIP: ABNORMAL /HPF
WBC NRBC COR # BLD AUTO: 14.63 10*3/MM3 (ref 3.4–10.8)

## 2024-06-26 PROCEDURE — 36415 COLL VENOUS BLD VENIPUNCTURE: CPT | Performed by: OBSTETRICS & GYNECOLOGY

## 2024-06-26 PROCEDURE — 81001 URINALYSIS AUTO W/SCOPE: CPT | Performed by: OBSTETRICS & GYNECOLOGY

## 2024-06-26 PROCEDURE — 59025 FETAL NON-STRESS TEST: CPT

## 2024-06-26 PROCEDURE — 87086 URINE CULTURE/COLONY COUNT: CPT | Performed by: OBSTETRICS & GYNECOLOGY

## 2024-06-26 PROCEDURE — 85027 COMPLETE CBC AUTOMATED: CPT | Performed by: OBSTETRICS & GYNECOLOGY

## 2024-06-26 PROCEDURE — G0463 HOSPITAL OUTPT CLINIC VISIT: HCPCS

## 2024-06-26 RX ORDER — PRENATAL VIT NO.126/IRON/FOLIC 28MG-0.8MG
1 TABLET ORAL DAILY
COMMUNITY

## 2024-06-27 NOTE — NON STRESS TEST
Nicki Schafer, a  at 32w1d with an JOSHUA of 2024, by Patient Reported, was seen at [facility] LABOR DELIVERY for a nonstress test.    Chief Complaint   Patient presents with    Vaginal Bleeding     Patient states she has been spotting since 1300. States has been having contractions x3 days. Reports positive fetal movement.        Patient Active Problem List   Diagnosis    Right ureteral stone    Pregnancy       Start Time:   Stop Time:     Interpretation A  Nonstress Test Interpretation A: Reactive  Comments A: Verified by SUGAR Amador RN

## 2024-06-27 NOTE — PLAN OF CARE
Goal Outcome Evaluation:  Plan of Care Reviewed With: patient        Progress: improving  Outcome Evaluation: Patient states she hasn't been feeling anymore contractions. Reports positive fetal movement. No bleeding visualize during vaginal exam. Discharge education provided, s/s labor vs braxon landon, fetal kick count, verbalized understanding.

## 2024-06-28 LAB — BACTERIA SPEC AEROBE CULT: NO GROWTH

## 2024-06-30 ENCOUNTER — HOSPITAL ENCOUNTER (OUTPATIENT)
Facility: HOSPITAL | Age: 29
Discharge: HOME | End: 2024-07-01
Attending: OBSTETRICS & GYNECOLOGY | Admitting: OBSTETRICS & GYNECOLOGY
Payer: MEDICAID

## 2024-06-30 PROBLEM — Z34.90 PREGNANT: Status: ACTIVE | Noted: 2024-06-30

## 2024-06-30 PROBLEM — R10.9 CRAMPING AFFECTING PREGNANCY, ANTEPARTUM: Status: ACTIVE | Noted: 2024-06-30

## 2024-06-30 PROBLEM — O26.899 CRAMPING AFFECTING PREGNANCY, ANTEPARTUM: Status: ACTIVE | Noted: 2024-06-30

## 2024-06-30 LAB
ALBUMIN SERPL-MCNC: 3.4 G/DL (ref 3.5–5.2)
ALBUMIN/GLOB SERPL: 0.9 G/DL
ALP SERPL-CCNC: 182 U/L (ref 39–117)
ALT SERPL W P-5'-P-CCNC: 12 U/L (ref 1–33)
ANION GAP SERPL CALCULATED.3IONS-SCNC: 13.6 MMOL/L (ref 5–15)
AST SERPL-CCNC: 19 U/L (ref 1–32)
BILIRUB SERPL-MCNC: 0.3 MG/DL (ref 0–1.2)
BILIRUB UR QL STRIP: NEGATIVE
BUN SERPL-MCNC: 7 MG/DL (ref 6–20)
BUN/CREAT SERPL: 11.1 (ref 7–25)
C TRACH RRNA CVX QL NAA+PROBE: NOT DETECTED
CALCIUM SPEC-SCNC: 9 MG/DL (ref 8.6–10.5)
CHLORIDE SERPL-SCNC: 106 MMOL/L (ref 98–107)
CLARITY UR: CLEAR
CO2 SERPL-SCNC: 17.4 MMOL/L (ref 22–29)
COLOR UR: YELLOW
CREAT SERPL-MCNC: 0.63 MG/DL (ref 0.57–1)
DEPRECATED RDW RBC AUTO: 42.6 FL (ref 37–54)
EGFRCR SERPLBLD CKD-EPI 2021: 123.3 ML/MIN/1.73
ERYTHROCYTE [DISTWIDTH] IN BLOOD BY AUTOMATED COUNT: 13.2 % (ref 12.3–15.4)
GLOBULIN UR ELPH-MCNC: 3.8 GM/DL
GLUCOSE SERPL-MCNC: 81 MG/DL (ref 65–99)
GLUCOSE UR STRIP-MCNC: NEGATIVE MG/DL
HCT VFR BLD AUTO: 40.2 % (ref 34–46.6)
HGB BLD-MCNC: 13.1 G/DL (ref 12–15.9)
HGB UR QL STRIP.AUTO: NEGATIVE
KETONES UR QL STRIP: NEGATIVE
LEUKOCYTE ESTERASE UR QL STRIP.AUTO: NEGATIVE
MCH RBC QN AUTO: 29 PG (ref 26.6–33)
MCHC RBC AUTO-ENTMCNC: 32.6 G/DL (ref 31.5–35.7)
MCV RBC AUTO: 89.1 FL (ref 79–97)
N GONORRHOEA RRNA SPEC QL NAA+PROBE: NOT DETECTED
NITRITE UR QL STRIP: NEGATIVE
PH UR STRIP.AUTO: 6.5 [PH] (ref 5–8)
PLATELET # BLD AUTO: 339 10*3/MM3 (ref 140–450)
PMV BLD AUTO: 12 FL (ref 6–12)
POTASSIUM SERPL-SCNC: 4.4 MMOL/L (ref 3.5–5.2)
PROT SERPL-MCNC: 7.2 G/DL (ref 6–8.5)
PROT UR QL STRIP: NEGATIVE
RBC # BLD AUTO: 4.51 10*6/MM3 (ref 3.77–5.28)
SODIUM SERPL-SCNC: 137 MMOL/L (ref 136–145)
SP GR UR STRIP: 1.02 (ref 1–1.03)
UROBILINOGEN UR QL STRIP: NORMAL
WBC NRBC COR # BLD AUTO: 14.47 10*3/MM3 (ref 3.4–10.8)

## 2024-06-30 PROCEDURE — 87086 URINE CULTURE/COLONY COUNT: CPT | Performed by: OBSTETRICS & GYNECOLOGY

## 2024-06-30 PROCEDURE — G0378 HOSPITAL OBSERVATION PER HR: HCPCS

## 2024-06-30 PROCEDURE — 80053 COMPREHEN METABOLIC PANEL: CPT | Performed by: OBSTETRICS & GYNECOLOGY

## 2024-06-30 PROCEDURE — P9612 CATHETERIZE FOR URINE SPEC: HCPCS

## 2024-06-30 PROCEDURE — 87591 N.GONORRHOEAE DNA AMP PROB: CPT | Performed by: OBSTETRICS & GYNECOLOGY

## 2024-06-30 PROCEDURE — G0463 HOSPITAL OUTPT CLINIC VISIT: HCPCS

## 2024-06-30 PROCEDURE — 59025 FETAL NON-STRESS TEST: CPT

## 2024-06-30 PROCEDURE — 85027 COMPLETE CBC AUTOMATED: CPT | Performed by: OBSTETRICS & GYNECOLOGY

## 2024-06-30 PROCEDURE — 81003 URINALYSIS AUTO W/O SCOPE: CPT | Performed by: OBSTETRICS & GYNECOLOGY

## 2024-06-30 PROCEDURE — 87491 CHLMYD TRACH DNA AMP PROBE: CPT | Performed by: OBSTETRICS & GYNECOLOGY

## 2024-06-30 RX ORDER — SODIUM CHLORIDE, SODIUM LACTATE, POTASSIUM CHLORIDE, CALCIUM CHLORIDE 600; 310; 30; 20 MG/100ML; MG/100ML; MG/100ML; MG/100ML
125 INJECTION, SOLUTION INTRAVENOUS CONTINUOUS
Status: DISCONTINUED | OUTPATIENT
Start: 2024-06-30 | End: 2024-07-01 | Stop reason: HOSPADM

## 2024-06-30 RX ORDER — SODIUM CHLORIDE 9 MG/ML
40 INJECTION, SOLUTION INTRAVENOUS AS NEEDED
Status: DISCONTINUED | OUTPATIENT
Start: 2024-06-30 | End: 2024-07-01 | Stop reason: HOSPADM

## 2024-06-30 RX ORDER — LIDOCAINE HYDROCHLORIDE 10 MG/ML
0.5 INJECTION, SOLUTION INFILTRATION; PERINEURAL ONCE AS NEEDED
Status: DISCONTINUED | OUTPATIENT
Start: 2024-06-30 | End: 2024-07-01 | Stop reason: HOSPADM

## 2024-06-30 RX ORDER — SODIUM CHLORIDE 0.9 % (FLUSH) 0.9 %
10 SYRINGE (ML) INJECTION AS NEEDED
Status: DISCONTINUED | OUTPATIENT
Start: 2024-06-30 | End: 2024-07-01 | Stop reason: HOSPADM

## 2024-06-30 RX ORDER — SODIUM CHLORIDE 0.9 % (FLUSH) 0.9 %
10 SYRINGE (ML) INJECTION EVERY 12 HOURS SCHEDULED
Status: DISCONTINUED | OUTPATIENT
Start: 2024-06-30 | End: 2024-07-01 | Stop reason: HOSPADM

## 2024-06-30 RX ORDER — TERBUTALINE SULFATE 1 MG/ML
0.25 INJECTION, SOLUTION SUBCUTANEOUS ONCE AS NEEDED
Status: DISCONTINUED | OUTPATIENT
Start: 2024-06-30 | End: 2024-07-01 | Stop reason: HOSPADM

## 2024-07-01 ENCOUNTER — APPOINTMENT (OUTPATIENT)
Dept: ULTRASOUND IMAGING | Facility: HOSPITAL | Age: 29
End: 2024-07-01
Payer: MEDICAID

## 2024-07-01 VITALS
BODY MASS INDEX: 30.74 KG/M2 | HEIGHT: 62 IN | SYSTOLIC BLOOD PRESSURE: 121 MMHG | DIASTOLIC BLOOD PRESSURE: 78 MMHG | TEMPERATURE: 98.4 F | RESPIRATION RATE: 20 BRPM | HEART RATE: 102 BPM

## 2024-07-01 PROCEDURE — 76819 FETAL BIOPHYS PROFIL W/O NST: CPT

## 2024-07-01 PROCEDURE — G0378 HOSPITAL OBSERVATION PER HR: HCPCS

## 2024-07-01 PROCEDURE — 76805 OB US >/= 14 WKS SNGL FETUS: CPT

## 2024-07-01 RX ORDER — ACETAMINOPHEN 325 MG/1
650 TABLET ORAL EVERY 6 HOURS PRN
Status: DISCONTINUED | OUTPATIENT
Start: 2024-07-01 | End: 2024-07-01 | Stop reason: HOSPADM

## 2024-07-01 RX ADMIN — ACETAMINOPHEN 650 MG: 325 TABLET ORAL at 05:59

## 2024-07-01 NOTE — H&P
Patient Care Team:  Yovana Robles APRN as PCP - General (Family Medicine)    Chief complaint vaginal discharge, spotting    Subjective     Patient is a 28 y.o. female  3 para 1 at 32 weeks and 6 days who presents with vaginal discharge with a little bit of spotting on it.  This started yesterday.  She had a little bit of cramping associated with it.  She has good fetal movement.  She is recently moved here with the dwayne from [place].  She says that she stays hot all of the time and she lives in a bus with no air conditioning.    Review of Systems   Pertinent items are noted in HPI    History  Past Medical History:   Diagnosis Date    ADHD (attention deficit hyperactivity disorder)     Anxiety     Depression     Kidney stones      Past Surgical History:   Procedure Laterality Date    URETEROSCOPY STENT INSERTION Right 2023    Procedure: URETEROSCOPY LASER LITHOTRIPSY WITH STENT INSERTION;  Surgeon: Farhad Allen MD;  Location: [facility];  Service: Urology;  Laterality: Right;    WRIST SURGERY       Family History   Problem Relation Age of Onset    No Known Problems Father     No Known Problems Mother      Social History     Tobacco Use    Smoking status: Former     Current packs/day: 0.00     Types: Cigarettes     Quit date:      Years since quittin.4    Smokeless tobacco: Never   Vaping Use    Vaping status: Former    Passive vaping exposure: Yes   Substance Use Topics    Alcohol use: No    Drug use: No     E-cigarette/Vaping    E-cigarette/Vaping Use Former User     Passive Exposure Yes     Counseling Given Yes      E-cigarette/Vaping Substances    Nicotine No      Medications Prior to Admission   Medication Sig Dispense Refill Last Dose    ondansetron (Zofran) 4 MG tablet Take 1 tablet by mouth Daily As Needed for Nausea or Vomiting. 30 tablet 1     prenatal vitamin (prenatal, CLASSIC, vitamin) tablet Take 1 tablet by mouth Daily.        Allergies:  Morphine    Objective      Vital Signs  Temp:  [97.9 °F (36.6 °C)-98.4 °F (36.9 °C)] 98.4 °F (36.9 °C)  Heart Rate:  [] 102  Resp:  [16-20] 20  BP: (110-121)/(69-78) 121/78    Physical Exam:      General Appearance:  Alert, cooperative, in no acute distress   Head:  Normocephalic, without obvious abnormality, atraumatic   Eyes:  Lids and lashes normal, conjunctivae and sclerae normal, no icterus, no pallor, corneas clear, PERRLA   Ears:  Ears appear intact with no abnormalities noted   Throat:  No oral lesions, no thrush, oral mucosa moist   Neck:  No adenopathy, supple, trachea midline, no thyromegaly, no carotid bruit, no JVD   Back:  No kyphosis present, no scoliosis present, no skin lesions, erythema or scars, no tenderness to percussion, or palpation, range of motion normal   Lungs:  Clear to auscultation,respirations regular, even and unlabored    Heart:  Regular rhythm and normal rate, normal S1 and S2, no murmur, no gallop, no rub, no click   Breast Exam:  Deferred   Abdomen:  Normal bowel sounds, no masses, no organomegaly, soft non-tender, non-distended, no guarding, no rebound tenderness   Genitalia:  Deferred   Extremities:  Moves all extremities well, no edema, no cyanosis, no redness   Pulses:  Pulses palpable and equal bilaterally   Skin:  No bleeding, bruising or rash   Lymph nodes:  No palpable adenopathy            Results Review:    I reviewed the patient's new clinical results.    Assessment & Plan       Cramping affecting pregnancy, antepartum    Pregnant    Ultrasound is normal.  BPP is 10 out of 10.  We are going to have her come to the office later this week to get her prenatal care started.  We discussed the precautions to come back to the hospital for in the meantime.    I discussed the patient's findings and my recommendations with patient and family.     Dustin Her DO  07/01/24  08:57 EDT

## 2024-07-01 NOTE — NON STRESS TEST
Nicki Schafer, a  at 32w5d with an JOSHUA of 2024, by Patient Reported, was seen at [facility] LABOR DELIVERY for a nonstress test.    Chief Complaint   Patient presents with    Abdominal Cramping     PT PRESENTS TO LABOR AND DELIVERY C/O INCREASED VAGINAL DISCHARGE AND SPOTTING. DENIES LOF. +FM. PT STATES SHE HAS RECEIVED PNC IN [place].        Patient Active Problem List   Diagnosis    Right ureteral stone    Pregnancy    Cramping affecting pregnancy, antepartum       Start Time:   Stop Time:     Interpretation A  Nonstress Test Interpretation A: Reactive  Comments A: VERIFIED BY NATALIE GRECO RN

## 2024-07-01 NOTE — PLAN OF CARE
Goal Outcome Evaluation:              Outcome Evaluation: VSS. FETAL TRACING REACTIVE. NO VB NOTED ON EXAM OR CONTRACTIONS. PT ABLE TO REST THIS SHIFT. POC EXPLAINED. PT VERBALIZED UNDERSTANDING.

## 2024-07-01 NOTE — DISCHARGE SUMMARY
Date of Discharge: 07/01/24     Discharge Diagnosis:   Abdominal cramping  Vaginal discharge    Problem List:    Cramping affecting pregnancy, antepartum    Pregnant      Presenting Problem/History of Present Illness  Cramping affecting pregnancy, antepartum [O26.899, R10.9]  Pregnant [Z34.90]      Hospital Course  Patient is a 28 y.o. female presented with vaginal discharge and cramping.  She was observed overnight.  An ultrasound was performed that was normal and a biophysical profile was 10 out of 10.  There were no decelerations and no bleeding was seen.  She was discharged home in stable condition to follow-up in the office within a week.  Procedures Performed         Consults:   Consults       No orders found for last 30 day(s).            Pertinent Test Results:    Condition on Discharge: Stable  Vital Signs  Temp:  [97.9 °F (36.6 °C)-98.4 °F (36.9 °C)] 98.4 °F (36.9 °C)  Heart Rate:  [] 102  Resp:  [16-20] 20  BP: (110-121)/(69-78) 121/78    Discharge Disposition      Discharge Medications     Discharge Medications        ASK your doctor about these medications        Instructions Start Date   ondansetron 4 MG tablet  Commonly known as: Zofran   4 mg, Oral, Daily PRN      prenatal (CLASSIC) vitamin 28-0.8 MG tablet tablet  Generic drug: prenatal vitamin   1 tablet, Oral, Daily               Discharge Diet       Activity at Discharge      Follow-up Appointments  No future appointments.        Time: Discharge 15 min

## 2024-07-02 ENCOUNTER — HOSPITAL ENCOUNTER (OUTPATIENT)
Facility: HOSPITAL | Age: 29
Discharge: HOME | End: 2024-07-03
Attending: OBSTETRICS & GYNECOLOGY | Admitting: OBSTETRICS & GYNECOLOGY
Payer: MEDICAID

## 2024-07-02 LAB
BACTERIA SPEC AEROBE CULT: NO GROWTH
BACTERIA UR QL AUTO: ABNORMAL /HPF
BILIRUB UR QL STRIP: NEGATIVE
CLARITY UR: CLEAR
COLOR UR: YELLOW
DEPRECATED RDW RBC AUTO: 41.6 FL (ref 37–54)
ERYTHROCYTE [DISTWIDTH] IN BLOOD BY AUTOMATED COUNT: 13.2 % (ref 12.3–15.4)
GLUCOSE UR STRIP-MCNC: NEGATIVE MG/DL
HCT VFR BLD AUTO: 36.2 % (ref 34–46.6)
HGB BLD-MCNC: 12 G/DL (ref 12–15.9)
HGB UR QL STRIP.AUTO: ABNORMAL
HYALINE CASTS UR QL AUTO: ABNORMAL /LPF
KETONES UR QL STRIP: NEGATIVE
LEUKOCYTE ESTERASE UR QL STRIP.AUTO: ABNORMAL
MCH RBC QN AUTO: 29 PG (ref 26.6–33)
MCHC RBC AUTO-ENTMCNC: 33.1 G/DL (ref 31.5–35.7)
MCV RBC AUTO: 87.4 FL (ref 79–97)
NITRITE UR QL STRIP: NEGATIVE
PH UR STRIP.AUTO: 7 [PH] (ref 5–8)
PLATELET # BLD AUTO: 353 10*3/MM3 (ref 140–450)
PMV BLD AUTO: 11.4 FL (ref 6–12)
PROT UR QL STRIP: NEGATIVE
RBC # BLD AUTO: 4.14 10*6/MM3 (ref 3.77–5.28)
RBC # UR STRIP: ABNORMAL /HPF
REF LAB TEST METHOD: ABNORMAL
SP GR UR STRIP: 1.02 (ref 1–1.03)
SQUAMOUS #/AREA URNS HPF: ABNORMAL /HPF
UROBILINOGEN UR QL STRIP: ABNORMAL
WBC # UR STRIP: ABNORMAL /HPF
WBC NRBC COR # BLD AUTO: 13.72 10*3/MM3 (ref 3.4–10.8)

## 2024-07-02 PROCEDURE — 87086 URINE CULTURE/COLONY COUNT: CPT | Performed by: OBSTETRICS & GYNECOLOGY

## 2024-07-02 PROCEDURE — G0463 HOSPITAL OUTPT CLINIC VISIT: HCPCS

## 2024-07-02 PROCEDURE — 25010000002 CEFTRIAXONE PER 250 MG: Performed by: OBSTETRICS & GYNECOLOGY

## 2024-07-02 PROCEDURE — 59025 FETAL NON-STRESS TEST: CPT

## 2024-07-02 PROCEDURE — 85027 COMPLETE CBC AUTOMATED: CPT | Performed by: OBSTETRICS & GYNECOLOGY

## 2024-07-02 PROCEDURE — 81001 URINALYSIS AUTO W/SCOPE: CPT | Performed by: OBSTETRICS & GYNECOLOGY

## 2024-07-02 PROCEDURE — 36415 COLL VENOUS BLD VENIPUNCTURE: CPT | Performed by: OBSTETRICS & GYNECOLOGY

## 2024-07-02 PROCEDURE — 25810000003 SODIUM CHLORIDE 0.9 % SOLUTION: Performed by: OBSTETRICS & GYNECOLOGY

## 2024-07-02 RX ORDER — SODIUM CHLORIDE 9 MG/ML
125 INJECTION, SOLUTION INTRAVENOUS CONTINUOUS
Status: DISCONTINUED | OUTPATIENT
Start: 2024-07-02 | End: 2024-07-03 | Stop reason: HOSPADM

## 2024-07-02 RX ADMIN — SODIUM CHLORIDE 2000 MG: 9 INJECTION, SOLUTION INTRAVENOUS at 23:04

## 2024-07-02 RX ADMIN — SODIUM CHLORIDE 125 ML/HR: 9 INJECTION, SOLUTION INTRAVENOUS at 23:03

## 2024-07-03 VITALS
SYSTOLIC BLOOD PRESSURE: 120 MMHG | RESPIRATION RATE: 18 BRPM | DIASTOLIC BLOOD PRESSURE: 74 MMHG | TEMPERATURE: 98.1 F | HEART RATE: 101 BPM | HEIGHT: 62 IN | WEIGHT: 169 LBS | BODY MASS INDEX: 31.1 KG/M2

## 2024-07-03 RX ORDER — ACETAMINOPHEN 325 MG/1
650 TABLET ORAL EVERY 6 HOURS PRN
Status: DISCONTINUED | OUTPATIENT
Start: 2024-07-03 | End: 2024-07-03 | Stop reason: HOSPADM

## 2024-07-03 RX ORDER — CEPHALEXIN 500 MG/1
500 CAPSULE ORAL 2 TIMES DAILY
Qty: 14 CAPSULE | Refills: 0 | Status: SHIPPED | OUTPATIENT
Start: 2024-07-03 | End: 2024-07-10

## 2024-07-03 RX ADMIN — ACETAMINOPHEN 650 MG: 325 TABLET ORAL at 01:09

## 2024-07-03 NOTE — NON STRESS TEST
Nicki Schafer, a  at 33w1d with an JOSHUA of 2024, by Patient Reported, was seen at [facility] LABOR DELIVERY for a nonstress test.    Chief Complaint   Patient presents with    Abdominal Cramping     MUCOUSY BLOODY DISCHARGE X 1 WEEK       Patient Active Problem List   Diagnosis    Right ureteral stone    Pregnancy    Cramping affecting pregnancy, antepartum    Pregnant       Start Time:   Stop Time:     Interpretation A  Nonstress Test Interpretation A: Reactive  Comments A: verified Nanette Manrique RN

## 2024-07-04 LAB — BACTERIA SPEC AEROBE CULT: NO GROWTH

## 2024-07-06 ENCOUNTER — HOSPITAL ENCOUNTER (OUTPATIENT)
Facility: HOSPITAL | Age: 29
Discharge: HOME | End: 2024-07-06
Attending: OBSTETRICS & GYNECOLOGY | Admitting: OBSTETRICS & GYNECOLOGY
Payer: MEDICAID

## 2024-07-06 VITALS
DIASTOLIC BLOOD PRESSURE: 75 MMHG | HEART RATE: 102 BPM | HEIGHT: 62 IN | SYSTOLIC BLOOD PRESSURE: 125 MMHG | WEIGHT: 169.5 LBS | RESPIRATION RATE: 20 BRPM | BODY MASS INDEX: 31.19 KG/M2 | TEMPERATURE: 98 F

## 2024-07-06 PROBLEM — N93.9 VAGINAL SPOTTING: Status: ACTIVE | Noted: 2024-07-06

## 2024-07-06 LAB
BILIRUB UR QL STRIP: NEGATIVE
CLARITY UR: CLEAR
COLOR UR: YELLOW
DEPRECATED RDW RBC AUTO: 42.9 FL (ref 37–54)
ERYTHROCYTE [DISTWIDTH] IN BLOOD BY AUTOMATED COUNT: 13.2 % (ref 12.3–15.4)
FIBRINOGEN PPP-MCNC: 513 MG/DL (ref 173–524)
GLUCOSE UR STRIP-MCNC: NEGATIVE MG/DL
HCT VFR BLD AUTO: 35.8 % (ref 34–46.6)
HGB BLD-MCNC: 11.6 G/DL (ref 12–15.9)
HGB UR QL STRIP.AUTO: NEGATIVE
KETONES UR QL STRIP: NEGATIVE
LEUKOCYTE ESTERASE UR QL STRIP.AUTO: NEGATIVE
MCH RBC QN AUTO: 29.1 PG (ref 26.6–33)
MCHC RBC AUTO-ENTMCNC: 32.4 G/DL (ref 31.5–35.7)
MCV RBC AUTO: 89.9 FL (ref 79–97)
NITRITE UR QL STRIP: NEGATIVE
PH UR STRIP.AUTO: 6.5 [PH] (ref 5–8)
PLATELET # BLD AUTO: 349 10*3/MM3 (ref 140–450)
PMV BLD AUTO: 11.7 FL (ref 6–12)
PROT UR QL STRIP: NEGATIVE
RBC # BLD AUTO: 3.98 10*6/MM3 (ref 3.77–5.28)
SP GR UR STRIP: 1.01 (ref 1–1.03)
UROBILINOGEN UR QL STRIP: NORMAL
WBC NRBC COR # BLD AUTO: 16.3 10*3/MM3 (ref 3.4–10.8)

## 2024-07-06 PROCEDURE — 85027 COMPLETE CBC AUTOMATED: CPT | Performed by: OBSTETRICS & GYNECOLOGY

## 2024-07-06 PROCEDURE — G0463 HOSPITAL OUTPT CLINIC VISIT: HCPCS

## 2024-07-06 PROCEDURE — 36415 COLL VENOUS BLD VENIPUNCTURE: CPT | Performed by: OBSTETRICS & GYNECOLOGY

## 2024-07-06 PROCEDURE — 59025 FETAL NON-STRESS TEST: CPT

## 2024-07-06 PROCEDURE — 87086 URINE CULTURE/COLONY COUNT: CPT | Performed by: OBSTETRICS & GYNECOLOGY

## 2024-07-06 PROCEDURE — P9612 CATHETERIZE FOR URINE SPEC: HCPCS

## 2024-07-06 PROCEDURE — 81003 URINALYSIS AUTO W/O SCOPE: CPT | Performed by: OBSTETRICS & GYNECOLOGY

## 2024-07-06 PROCEDURE — 85384 FIBRINOGEN ACTIVITY: CPT | Performed by: OBSTETRICS & GYNECOLOGY

## 2024-07-07 LAB — BACTERIA SPEC AEROBE CULT: NO GROWTH

## 2024-07-07 NOTE — PLAN OF CARE
Goal Outcome Evaluation:              Outcome Evaluation: Pt states she feels comfortable being DC home. Educated on ss of PTL and fetal kick counts. Has appt in office on 7/10/2024. States she will keep that. Pt verbalized understanding warning signs when to return to [facility].

## 2024-07-07 NOTE — NON STRESS TEST
Nicki Schafer, a  at 33w4d with an JOSHUA of 2024, by Patient Reported, was seen at [facility] LABOR DELIVERY for a nonstress test.    Chief Complaint   Patient presents with    Vaginal Bleeding     PT C/O VAG SPOTTING ON TISSUE THROUGHOUT THE DAY. PT ALSO C/O BACK PAIN RADIATING TO LOWER ABD.       Patient Active Problem List   Diagnosis    Right ureteral stone    Pregnancy    Cramping affecting pregnancy, antepartum    Pregnant    Vaginal spotting       Start Time:   Stop Time:     Interpretation A  Nonstress Test Interpretation A: Reactive  Comments A: VERIFIED BY LUKAS FORTUNE RN

## 2024-07-07 NOTE — DISCHARGE INSTRUCTIONS
RETURN TO LABOR AND DELIVERY IF YOU HAVE REGULAR CONTRACTIONS, LEAKING OF FLUID OR VAGINAL BLEEDING. MONITOR FETAL KICK COUNTS AND RETURN TO LABOR AND DELIVERY IF YOU NOTICE A DECREASE IN FETAL MOVEMENT. KEEP APPT IN OFFICE ON 7/10/2024.

## 2024-07-09 ENCOUNTER — HOSPITAL ENCOUNTER (OUTPATIENT)
Facility: HOSPITAL | Age: 29
Discharge: HOME | End: 2024-07-09
Attending: OBSTETRICS & GYNECOLOGY | Admitting: OBSTETRICS & GYNECOLOGY
Payer: MEDICAID

## 2024-07-09 ENCOUNTER — APPOINTMENT (OUTPATIENT)
Dept: ULTRASOUND IMAGING | Facility: HOSPITAL | Age: 29
End: 2024-07-09
Payer: MEDICAID

## 2024-07-09 VITALS
HEIGHT: 62 IN | SYSTOLIC BLOOD PRESSURE: 119 MMHG | TEMPERATURE: 98 F | DIASTOLIC BLOOD PRESSURE: 67 MMHG | HEART RATE: 105 BPM | WEIGHT: 167.9 LBS | BODY MASS INDEX: 30.9 KG/M2 | RESPIRATION RATE: 16 BRPM

## 2024-07-09 PROBLEM — O42.90 AMNIOTIC FLUID LEAKING: Status: ACTIVE | Noted: 2024-07-09

## 2024-07-09 LAB
A1 MICROGLOB PLACENTAL VAG QL: NEGATIVE
BASOPHILS # BLD AUTO: 0.07 10*3/MM3 (ref 0–0.2)
BASOPHILS NFR BLD AUTO: 0.5 % (ref 0–1.5)
BILIRUB UR QL STRIP: NEGATIVE
CLARITY UR: CLEAR
COLOR UR: ABNORMAL
DEPRECATED RDW RBC AUTO: 42.4 FL (ref 37–54)
EOSINOPHIL # BLD AUTO: 0.33 10*3/MM3 (ref 0–0.4)
EOSINOPHIL NFR BLD AUTO: 2.2 % (ref 0.3–6.2)
ERYTHROCYTE [DISTWIDTH] IN BLOOD BY AUTOMATED COUNT: 13.3 % (ref 12.3–15.4)
GLUCOSE UR STRIP-MCNC: NEGATIVE MG/DL
HCT VFR BLD AUTO: 35.7 % (ref 34–46.6)
HGB BLD-MCNC: 11.8 G/DL (ref 12–15.9)
HGB UR QL STRIP.AUTO: NEGATIVE
IMM GRANULOCYTES # BLD AUTO: 0.28 10*3/MM3 (ref 0–0.05)
IMM GRANULOCYTES NFR BLD AUTO: 1.9 % (ref 0–0.5)
KETONES UR QL STRIP: NEGATIVE
LEUKOCYTE ESTERASE UR QL STRIP.AUTO: NEGATIVE
LYMPHOCYTES # BLD AUTO: 2.94 10*3/MM3 (ref 0.7–3.1)
LYMPHOCYTES NFR BLD AUTO: 19.4 % (ref 19.6–45.3)
MCH RBC QN AUTO: 28.9 PG (ref 26.6–33)
MCHC RBC AUTO-ENTMCNC: 33.1 G/DL (ref 31.5–35.7)
MCV RBC AUTO: 87.5 FL (ref 79–97)
MONOCYTES # BLD AUTO: 1.21 10*3/MM3 (ref 0.1–0.9)
MONOCYTES NFR BLD AUTO: 8 % (ref 5–12)
NEUTROPHILS NFR BLD AUTO: 10.3 10*3/MM3 (ref 1.7–7)
NEUTROPHILS NFR BLD AUTO: 68 % (ref 42.7–76)
NITRITE UR QL STRIP: NEGATIVE
NRBC BLD AUTO-RTO: 0 /100 WBC (ref 0–0.2)
PH UR STRIP.AUTO: 6.5 [PH] (ref 5–8)
PLATELET # BLD AUTO: 375 10*3/MM3 (ref 140–450)
PMV BLD AUTO: 12.1 FL (ref 6–12)
PROT UR QL STRIP: ABNORMAL
RBC # BLD AUTO: 4.08 10*6/MM3 (ref 3.77–5.28)
SP GR UR STRIP: 1.02 (ref 1–1.03)
UROBILINOGEN UR QL STRIP: ABNORMAL
WBC NRBC COR # BLD AUTO: 15.13 10*3/MM3 (ref 3.4–10.8)

## 2024-07-09 PROCEDURE — G0463 HOSPITAL OUTPT CLINIC VISIT: HCPCS

## 2024-07-09 PROCEDURE — 87086 URINE CULTURE/COLONY COUNT: CPT | Performed by: OBSTETRICS & GYNECOLOGY

## 2024-07-09 PROCEDURE — 59025 FETAL NON-STRESS TEST: CPT

## 2024-07-09 PROCEDURE — 36415 COLL VENOUS BLD VENIPUNCTURE: CPT | Performed by: OBSTETRICS & GYNECOLOGY

## 2024-07-09 PROCEDURE — 25010000002 TERBUTALINE PER 1 MG: Performed by: OBSTETRICS & GYNECOLOGY

## 2024-07-09 PROCEDURE — 84112 EVAL AMNIOTIC FLUID PROTEIN: CPT | Performed by: OBSTETRICS & GYNECOLOGY

## 2024-07-09 PROCEDURE — 85025 COMPLETE CBC W/AUTO DIFF WBC: CPT | Performed by: OBSTETRICS & GYNECOLOGY

## 2024-07-09 PROCEDURE — 81003 URINALYSIS AUTO W/O SCOPE: CPT | Performed by: OBSTETRICS & GYNECOLOGY

## 2024-07-09 PROCEDURE — 76819 FETAL BIOPHYS PROFIL W/O NST: CPT

## 2024-07-09 PROCEDURE — 25810000003 LACTATED RINGERS PER 1000 ML: Performed by: OBSTETRICS & GYNECOLOGY

## 2024-07-09 PROCEDURE — 25810000003 LACTATED RINGERS SOLUTION: Performed by: OBSTETRICS & GYNECOLOGY

## 2024-07-09 RX ORDER — HYDROXYZINE HYDROCHLORIDE 25 MG/1
25 TABLET, FILM COATED ORAL ONCE AS NEEDED
Status: COMPLETED | OUTPATIENT
Start: 2024-07-09 | End: 2024-07-09

## 2024-07-09 RX ORDER — ACETAMINOPHEN 500 MG
1000 TABLET ORAL EVERY 6 HOURS PRN
Status: DISCONTINUED | OUTPATIENT
Start: 2024-07-09 | End: 2024-07-09 | Stop reason: HOSPADM

## 2024-07-09 RX ORDER — NIFEDIPINE 30 MG/1
30 TABLET, EXTENDED RELEASE ORAL DAILY
Qty: 30 TABLET | Refills: 0 | Status: ON HOLD | OUTPATIENT
Start: 2024-07-09 | End: 2024-07-15

## 2024-07-09 RX ORDER — TERBUTALINE SULFATE 1 MG/ML
0.25 INJECTION, SOLUTION SUBCUTANEOUS ONCE
Status: COMPLETED | OUTPATIENT
Start: 2024-07-09 | End: 2024-07-09

## 2024-07-09 RX ORDER — SODIUM CHLORIDE, SODIUM LACTATE, POTASSIUM CHLORIDE, CALCIUM CHLORIDE 600; 310; 30; 20 MG/100ML; MG/100ML; MG/100ML; MG/100ML
150 INJECTION, SOLUTION INTRAVENOUS CONTINUOUS
Status: DISCONTINUED | OUTPATIENT
Start: 2024-07-09 | End: 2024-07-09 | Stop reason: HOSPADM

## 2024-07-09 RX ORDER — NIFEDIPINE 10 MG/1
10 CAPSULE ORAL
Qty: 130 CAPSULE | Refills: 0 | Status: SHIPPED | OUTPATIENT
Start: 2024-07-09 | End: 2024-07-09

## 2024-07-09 RX ORDER — NIFEDIPINE 10 MG/1
10 CAPSULE ORAL
Status: DISCONTINUED | OUTPATIENT
Start: 2024-07-09 | End: 2024-07-09 | Stop reason: HOSPADM

## 2024-07-09 RX ORDER — SODIUM CHLORIDE 0.9 % (FLUSH) 0.9 %
10 SYRINGE (ML) INJECTION AS NEEDED
Status: DISCONTINUED | OUTPATIENT
Start: 2024-07-09 | End: 2024-07-09 | Stop reason: HOSPADM

## 2024-07-09 RX ORDER — SODIUM CHLORIDE 0.9 % (FLUSH) 0.9 %
10 SYRINGE (ML) INJECTION EVERY 12 HOURS SCHEDULED
Status: DISCONTINUED | OUTPATIENT
Start: 2024-07-09 | End: 2024-07-09 | Stop reason: HOSPADM

## 2024-07-09 RX ADMIN — NIFEDIPINE 10 MG: 10 CAPSULE ORAL at 14:00

## 2024-07-09 RX ADMIN — ACETAMINOPHEN 1000 MG: 500 TABLET ORAL at 14:00

## 2024-07-09 RX ADMIN — HYDROXYZINE HYDROCHLORIDE 25 MG: 25 TABLET, FILM COATED ORAL at 13:59

## 2024-07-09 RX ADMIN — SODIUM CHLORIDE, POTASSIUM CHLORIDE, SODIUM LACTATE AND CALCIUM CHLORIDE 150 ML/HR: 600; 310; 30; 20 INJECTION, SOLUTION INTRAVENOUS at 11:46

## 2024-07-09 RX ADMIN — TERBUTALINE SULFATE 0.25 MG: 1 INJECTION SUBCUTANEOUS at 12:02

## 2024-07-09 RX ADMIN — SODIUM CHLORIDE, POTASSIUM CHLORIDE, SODIUM LACTATE AND CALCIUM CHLORIDE 1000 ML: 600; 310; 30; 20 INJECTION, SOLUTION INTRAVENOUS at 11:19

## 2024-07-09 NOTE — NON STRESS TEST
Nickiovidio Schafer, a  at 34w0d with an JOSHUA of 2024, by Patient Reported, was seen at [facility] LABOR DELIVERY for a nonstress test.    Chief Complaint   Patient presents with    Leaking Fluid    Contractions     +FM no bleeding       Patient Active Problem List   Diagnosis    Right ureteral stone    Pregnancy    Cramping affecting pregnancy, antepartum    Pregnant    Vaginal spotting    Amniotic fluid leaking       Start Time: 1037  Stop Time: 1100    Interpretation A  Nonstress Test Interpretation A: Reactive  Comments A: verified with Mariah KEARNS RN

## 2024-07-09 NOTE — NURSING NOTE
Pt had prenatal care in [place] with Dr. Garcia and a high risk doctor, has an appointment to see [facility] on 7/10/24. Prenatal records obtained and sent to medical records. Pt had subchorionic hematoma that resolved at 12 weeks but has had persistent bleeding throughout pregnancy. Pt states she was told she was going to be having a primary  for velamentous cord insertion, records from  indicate normal cord insertion per high risk US and no note about . Pt seen today for  labor, sent home with prescription of Procardia 30mg XL

## 2024-07-10 LAB — BACTERIA SPEC AEROBE CULT: NO GROWTH

## 2024-07-14 ENCOUNTER — HOSPITAL ENCOUNTER (INPATIENT)
Facility: HOSPITAL | Age: 29
LOS: 3 days | Discharge: HOME | End: 2024-07-17
Attending: OBSTETRICS & GYNECOLOGY | Admitting: OBSTETRICS & GYNECOLOGY
Payer: MEDICAID

## 2024-07-14 ENCOUNTER — ANESTHESIA EVENT (OUTPATIENT)
Dept: LABOR AND DELIVERY | Facility: HOSPITAL | Age: 29
End: 2024-07-14
Payer: MEDICAID

## 2024-07-14 ENCOUNTER — ANESTHESIA (OUTPATIENT)
Dept: LABOR AND DELIVERY | Facility: HOSPITAL | Age: 29
End: 2024-07-14
Payer: MEDICAID

## 2024-07-14 LAB
A1 MICROGLOB PLACENTAL VAG QL: POSITIVE
ABO GROUP BLD: NORMAL
AMPHET+METHAMPHET UR QL: POSITIVE
AMPHETAMINES UR QL: POSITIVE
BACTERIA UR QL AUTO: ABNORMAL /HPF
BARBITURATES UR QL SCN: NEGATIVE
BASOPHILS # BLD AUTO: 0.06 10*3/MM3 (ref 0–0.2)
BASOPHILS NFR BLD AUTO: 0.4 % (ref 0–1.5)
BENZODIAZ UR QL SCN: NEGATIVE
BILIRUB UR QL STRIP: NEGATIVE
BLD GP AB SCN SERPL QL: NEGATIVE
BUPRENORPHINE SERPL-MCNC: NEGATIVE NG/ML
CANNABINOIDS SERPL QL: NEGATIVE
CLARITY UR: ABNORMAL
COCAINE UR QL: NEGATIVE
COD CRY URNS QL: ABNORMAL /HPF
COLOR UR: ABNORMAL
DEPRECATED RDW RBC AUTO: 43.4 FL (ref 37–54)
EOSINOPHIL # BLD AUTO: 0.3 10*3/MM3 (ref 0–0.4)
EOSINOPHIL NFR BLD AUTO: 2.1 % (ref 0.3–6.2)
ERYTHROCYTE [DISTWIDTH] IN BLOOD BY AUTOMATED COUNT: 13.6 % (ref 12.3–15.4)
FENTANYL UR-MCNC: NEGATIVE NG/ML
GLUCOSE UR STRIP-MCNC: NEGATIVE MG/DL
HBV SURFACE AG SERPL QL IA: NORMAL
HCT VFR BLD AUTO: 35.2 % (ref 34–46.6)
HCV AB SER QL: NORMAL
HGB BLD-MCNC: 11.3 G/DL (ref 12–15.9)
HGB UR QL STRIP.AUTO: NEGATIVE
HIV 1+2 AB+HIV1 P24 AG SERPL QL IA: NORMAL
HYALINE CASTS UR QL AUTO: ABNORMAL /LPF
IMM GRANULOCYTES # BLD AUTO: 0.12 10*3/MM3 (ref 0–0.05)
IMM GRANULOCYTES NFR BLD AUTO: 0.8 % (ref 0–0.5)
KETONES UR QL STRIP: NEGATIVE
LEUKOCYTE ESTERASE UR QL STRIP.AUTO: ABNORMAL
LYMPHOCYTES # BLD AUTO: 2.29 10*3/MM3 (ref 0.7–3.1)
LYMPHOCYTES NFR BLD AUTO: 15.8 % (ref 19.6–45.3)
MCH RBC QN AUTO: 28.3 PG (ref 26.6–33)
MCHC RBC AUTO-ENTMCNC: 32.1 G/DL (ref 31.5–35.7)
MCV RBC AUTO: 88 FL (ref 79–97)
METHADONE UR QL SCN: NEGATIVE
MONOCYTES # BLD AUTO: 1.17 10*3/MM3 (ref 0.1–0.9)
MONOCYTES NFR BLD AUTO: 8.1 % (ref 5–12)
NEUTROPHILS NFR BLD AUTO: 10.58 10*3/MM3 (ref 1.7–7)
NEUTROPHILS NFR BLD AUTO: 72.8 % (ref 42.7–76)
NITRITE UR QL STRIP: NEGATIVE
NRBC BLD AUTO-RTO: 0 /100 WBC (ref 0–0.2)
OPIATES UR QL: NEGATIVE
OXYCODONE UR QL SCN: NEGATIVE
PCP UR QL SCN: NEGATIVE
PH UR STRIP.AUTO: 6 [PH] (ref 5–8)
PLATELET # BLD AUTO: 356 10*3/MM3 (ref 140–450)
PMV BLD AUTO: 11.8 FL (ref 6–12)
PROT UR QL STRIP: ABNORMAL
RBC # BLD AUTO: 4 10*6/MM3 (ref 3.77–5.28)
RBC # UR STRIP: ABNORMAL /HPF
REF LAB TEST METHOD: ABNORMAL
RH BLD: POSITIVE
SP GR UR STRIP: 1.02 (ref 1–1.03)
SQUAMOUS #/AREA URNS HPF: ABNORMAL /HPF
T&S EXPIRATION DATE: NORMAL
TRICYCLICS UR QL SCN: NEGATIVE
UROBILINOGEN UR QL STRIP: ABNORMAL
WBC # UR STRIP: ABNORMAL /HPF
WBC NRBC COR # BLD AUTO: 14.52 10*3/MM3 (ref 3.4–10.8)

## 2024-07-14 PROCEDURE — 80081 OBSTETRIC PANEL INC HIV TSTG: CPT | Performed by: OBSTETRICS & GYNECOLOGY

## 2024-07-14 PROCEDURE — 86780 TREPONEMA PALLIDUM: CPT | Performed by: OBSTETRICS & GYNECOLOGY

## 2024-07-14 PROCEDURE — 84112 EVAL AMNIOTIC FLUID PROTEIN: CPT | Performed by: OBSTETRICS & GYNECOLOGY

## 2024-07-14 PROCEDURE — 87086 URINE CULTURE/COLONY COUNT: CPT | Performed by: OBSTETRICS & GYNECOLOGY

## 2024-07-14 PROCEDURE — 86803 HEPATITIS C AB TEST: CPT | Performed by: OBSTETRICS & GYNECOLOGY

## 2024-07-14 PROCEDURE — 25010000002 AMPICILLIN PER 500 MG: Performed by: OBSTETRICS & GYNECOLOGY

## 2024-07-14 PROCEDURE — 25010000002 FENTANYL CITRATE (PF) 50 MCG/ML SOLUTION: Performed by: NURSE ANESTHETIST, CERTIFIED REGISTERED

## 2024-07-14 PROCEDURE — 81001 URINALYSIS AUTO W/SCOPE: CPT | Performed by: OBSTETRICS & GYNECOLOGY

## 2024-07-14 PROCEDURE — 25810000003 LACTATED RINGERS PER 1000 ML: Performed by: OBSTETRICS & GYNECOLOGY

## 2024-07-14 PROCEDURE — 25010000002 ONDANSETRON PER 1 MG: Performed by: NURSE ANESTHETIST, CERTIFIED REGISTERED

## 2024-07-14 PROCEDURE — 25810000003 LACTATED RINGERS SOLUTION: Performed by: OBSTETRICS & GYNECOLOGY

## 2024-07-14 PROCEDURE — 88307 TISSUE EXAM BY PATHOLOGIST: CPT

## 2024-07-14 PROCEDURE — 25010000002 LIDOCAINE 1 % SOLUTION: Performed by: NURSE ANESTHETIST, CERTIFIED REGISTERED

## 2024-07-14 PROCEDURE — C1755 CATHETER, INTRASPINAL: HCPCS | Performed by: ANESTHESIOLOGY

## 2024-07-14 PROCEDURE — 25010000002 ROPIVACAINE PER 1 MG: Performed by: NURSE ANESTHETIST, CERTIFIED REGISTERED

## 2024-07-14 PROCEDURE — 36415 COLL VENOUS BLD VENIPUNCTURE: CPT | Performed by: OBSTETRICS & GYNECOLOGY

## 2024-07-14 PROCEDURE — 80307 DRUG TEST PRSMV CHEM ANLYZR: CPT | Performed by: OBSTETRICS & GYNECOLOGY

## 2024-07-14 RX ORDER — SODIUM CHLORIDE 0.9 % (FLUSH) 0.9 %
10 SYRINGE (ML) INJECTION EVERY 12 HOURS SCHEDULED
Status: DISCONTINUED | OUTPATIENT
Start: 2024-07-14 | End: 2024-07-15

## 2024-07-14 RX ORDER — LIDOCAINE HYDROCHLORIDE 10 MG/ML
0.5 INJECTION, SOLUTION INFILTRATION; PERINEURAL ONCE AS NEEDED
Status: DISCONTINUED | OUTPATIENT
Start: 2024-07-14 | End: 2024-07-15

## 2024-07-14 RX ORDER — ROPIVACAINE HYDROCHLORIDE 2 MG/ML
INJECTION, SOLUTION EPIDURAL; INFILTRATION; PERINEURAL AS NEEDED
Status: DISCONTINUED | OUTPATIENT
Start: 2024-07-14 | End: 2024-07-15 | Stop reason: SURG

## 2024-07-14 RX ORDER — ONDANSETRON 2 MG/ML
4 INJECTION INTRAMUSCULAR; INTRAVENOUS ONCE AS NEEDED
Status: COMPLETED | OUTPATIENT
Start: 2024-07-14 | End: 2024-07-14

## 2024-07-14 RX ORDER — OXYTOCIN/0.9 % SODIUM CHLORIDE 30/500 ML
PLASTIC BAG, INJECTION (ML) INTRAVENOUS
Status: COMPLETED
Start: 2024-07-14 | End: 2024-07-14

## 2024-07-14 RX ORDER — FAMOTIDINE 10 MG/ML
20 INJECTION, SOLUTION INTRAVENOUS ONCE AS NEEDED
Status: DISCONTINUED | OUTPATIENT
Start: 2024-07-14 | End: 2024-07-15

## 2024-07-14 RX ORDER — ROPIVACAINE HYDROCHLORIDE 2 MG/ML
14 INJECTION, SOLUTION EPIDURAL; INFILTRATION; PERINEURAL CONTINUOUS
Status: DISCONTINUED | OUTPATIENT
Start: 2024-07-14 | End: 2024-07-15

## 2024-07-14 RX ORDER — OXYTOCIN/0.9 % SODIUM CHLORIDE 30/500 ML
2-20 PLASTIC BAG, INJECTION (ML) INTRAVENOUS
Status: DISCONTINUED | OUTPATIENT
Start: 2024-07-14 | End: 2024-07-15

## 2024-07-14 RX ORDER — SODIUM CHLORIDE 9 MG/ML
40 INJECTION, SOLUTION INTRAVENOUS AS NEEDED
Status: DISCONTINUED | OUTPATIENT
Start: 2024-07-14 | End: 2024-07-15

## 2024-07-14 RX ORDER — SODIUM CHLORIDE, SODIUM LACTATE, POTASSIUM CHLORIDE, CALCIUM CHLORIDE 600; 310; 30; 20 MG/100ML; MG/100ML; MG/100ML; MG/100ML
125 INJECTION, SOLUTION INTRAVENOUS CONTINUOUS
Status: DISCONTINUED | OUTPATIENT
Start: 2024-07-14 | End: 2024-07-15

## 2024-07-14 RX ORDER — FENTANYL CITRATE 50 UG/ML
INJECTION, SOLUTION INTRAMUSCULAR; INTRAVENOUS AS NEEDED
Status: DISCONTINUED | OUTPATIENT
Start: 2024-07-14 | End: 2024-07-15 | Stop reason: SURG

## 2024-07-14 RX ORDER — SODIUM CHLORIDE 0.9 % (FLUSH) 0.9 %
10 SYRINGE (ML) INJECTION AS NEEDED
Status: DISCONTINUED | OUTPATIENT
Start: 2024-07-14 | End: 2024-07-15

## 2024-07-14 RX ORDER — LIDOCAINE HYDROCHLORIDE 10 MG/ML
INJECTION, SOLUTION INFILTRATION; PERINEURAL AS NEEDED
Status: DISCONTINUED | OUTPATIENT
Start: 2024-07-14 | End: 2024-07-15 | Stop reason: SURG

## 2024-07-14 RX ORDER — EPHEDRINE SULFATE 5 MG/ML
10 INJECTION INTRAVENOUS
Status: DISCONTINUED | OUTPATIENT
Start: 2024-07-14 | End: 2024-07-15

## 2024-07-14 RX ORDER — ACETAMINOPHEN 325 MG/1
650 TABLET ORAL EVERY 4 HOURS PRN
Status: DISCONTINUED | OUTPATIENT
Start: 2024-07-14 | End: 2024-07-15

## 2024-07-14 RX ORDER — DIPHENHYDRAMINE HYDROCHLORIDE 50 MG/ML
12.5 INJECTION INTRAMUSCULAR; INTRAVENOUS EVERY 8 HOURS PRN
Status: DISCONTINUED | OUTPATIENT
Start: 2024-07-14 | End: 2024-07-15

## 2024-07-14 RX ADMIN — LIDOCAINE HYDROCHLORIDE 3 ML: 10 INJECTION, SOLUTION INFILTRATION; PERINEURAL at 20:19

## 2024-07-14 RX ADMIN — FENTANYL CITRATE 100 MCG: 50 INJECTION INTRAMUSCULAR; INTRAVENOUS at 20:24

## 2024-07-14 RX ADMIN — AMPICILLIN SODIUM 1000 MG: 1 INJECTION, POWDER, FOR SOLUTION INTRAMUSCULAR; INTRAVENOUS at 19:47

## 2024-07-14 RX ADMIN — SODIUM CHLORIDE, POTASSIUM CHLORIDE, SODIUM LACTATE AND CALCIUM CHLORIDE 125 ML/HR: 600; 310; 30; 20 INJECTION, SOLUTION INTRAVENOUS at 16:26

## 2024-07-14 RX ADMIN — AMPICILLIN SODIUM 2000 MG: 2 INJECTION, POWDER, FOR SOLUTION INTRAVENOUS at 16:24

## 2024-07-14 RX ADMIN — Medication 2 MILLI-UNITS/MIN: at 16:25

## 2024-07-14 RX ADMIN — ROPIVACAINE HYDROCHLORIDE 10 ML: 2 INJECTION, SOLUTION EPIDURAL; INFILTRATION at 20:24

## 2024-07-14 RX ADMIN — ONDANSETRON 4 MG: 2 INJECTION INTRAMUSCULAR; INTRAVENOUS at 21:23

## 2024-07-14 RX ADMIN — SODIUM CHLORIDE, POTASSIUM CHLORIDE, SODIUM LACTATE AND CALCIUM CHLORIDE 1000 ML: 600; 310; 30; 20 INJECTION, SOLUTION INTRAVENOUS at 15:34

## 2024-07-14 RX ADMIN — ROPIVACAINE HYDROCHLORIDE 14 ML/HR: 2 INJECTION, SOLUTION EPIDURAL; INFILTRATION at 20:25

## 2024-07-14 NOTE — H&P
"MIRELA Doss  Obstetric History and Physical    Chief Complaint   Patient presents with    Leaking Fluid     Started leaking clear fluid about 0230 a.m., denies contractions, or VB and has +FM       Subjective     Patient is a 28 y.o. female  currently at 34w1d, who presents with LOF and +amnisure test.  She has insufficient PNC that started in [place] and has only 1 appt with us.  JOSHUA by 10w6d u/s.  Cervix is currently 3 cm.  She has not had celestone.  Limited Prenatal labs.      Her prenatal care is complicated by  insufficient prenatal care .  Her previous obstetric/gynecological history is noted for is non-contributory.    The following portions of the patients history were reviewed and updated as appropriate: current medications, allergies, past medical history, past surgical history, past family history, past social history, and problem list .       Prenatal Information:   Maternal Prenatal Labs  Blood Type No results found for: \"ABO\"   Rh Status No results found for: \"RH\"   Antibody Screen No results found for: \"ABSCRN\"   Rapid Urine Drug Screen Barbiturates Screen, Urine   Date Value Ref Range Status   2024 Negative Negative Final     Benzodiazepine Screen, Urine   Date Value Ref Range Status   2024 Negative Negative Final     Methadone Screen, Urine   Date Value Ref Range Status   2024 Negative Negative Final     Opiate Screen   Date Value Ref Range Status   2024 Negative Negative Final     THC, Screen, Urine   Date Value Ref Range Status   2024 Negative Negative Final     Cocaine Screen, Urine   Date Value Ref Range Status   2024 Negative Negative Final     Amphetamine Screen, Urine   Date Value Ref Range Status   2024 Positive (A) Negative Final     Buprenorphine, Screen, Urine   Date Value Ref Range Status   2024 Negative Negative Final     Methamphetamine, Ur   Date Value Ref Range Status   2024 Positive (A) Negative Final     Oxycodone Screen, " "Urine   Date Value Ref Range Status   07/14/2024 Negative Negative Final     Tricyclic Antidepressants Screen   Date Value Ref Range Status   07/14/2024 Negative Negative Final      Group B Strep Culture No results found for: \"GBSANTIGEN\"           External Prenatal Results       Pregnancy Outside Results - Transcribed From Office Records - See Scanned Records For Details       Test Value Date Time    ABO  A  04/16/21 1113    Rh  Positive  04/16/21 1113    Antibody Screen       Varicella IgG       Rubella       Hgb  11.3 g/dL 07/14/24 1445       11.8 g/dL 07/09/24 1106       11.6 g/dL 07/06/24 1855       12.0 g/dL 07/02/24 2151       13.1 g/dL 06/30/24 2000       12.1 g/dL 06/26/24 1917    Hct  35.2 % 07/14/24 1445       35.7 % 07/09/24 1106       35.8 % 07/06/24 1855       36.2 % 07/02/24 2151       40.2 % 06/30/24 2000       37.0 % 06/26/24 1917    HgB A1c        1h GTT       3h GTT Fasting       3h GTT 1 hour       3h GTT 2 hour       3h GTT 3 hour        Gonorrhea (discrete)  Not Detected  06/30/24 1957    Chlamydia (discrete)  Not Detected  06/30/24 1957    RPR       Syphilis Antibody       HBsAg       Herpes Simplex Virus PCR       Herpes Simplex VIrus Culture       HIV       Hep C RNA Quant PCR       Hep C Antibody       AFP       NIPT       Cystic Fibroisis        Group B Strep       GBS Susceptibility to Clindamycin       GBS Susceptibility to Erythromycin       Fetal Fibronectin       Genetic Testing, Maternal Blood                 Drug Screening       Test Value Date Time    Urine Drug Screen       Amphetamine Screen  Positive  07/14/24 1357    Barbiturate Screen  Negative  07/14/24 1357    Benzodiazepine Screen  Negative  07/14/24 1357    Methadone Screen  Negative  07/14/24 1357    Phencyclidine Screen  Negative  07/14/24 1357    Opiates Screen  Negative  07/14/24 1357    THC Screen  Negative  07/14/24 1357    Cocaine Screen       Propoxyphene Screen       Buprenorphine Screen  Negative  07/14/24 1357 "    Methamphetamine Screen       Oxycodone Screen  Negative  24 1357    Tricyclic Antidepressants Screen  Negative  24 1357              Legend    ^: Historical                              Past OB History:     OB History    Para Term  AB Living   3 1 1 0 1 1   SAB IAB Ectopic Molar Multiple Live Births   1 0 0 0 0 1      # Outcome Date GA Lbr Elijah/2nd Weight Sex Type Anes PTL Lv   3 Current            2 Term 22 38w0d   F Vag-Spont   LETTY   1 SAB  9w0d              Past Medical History: Past Medical History:   Diagnosis Date    ADHD (attention deficit hyperactivity disorder)     Anxiety     Depression     Kidney stones       Past Surgical History Past Surgical History:   Procedure Laterality Date    URETEROSCOPY STENT INSERTION Right 2023    Procedure: URETEROSCOPY LASER LITHOTRIPSY WITH STENT INSERTION;  Surgeon: Farhad Allen MD;  Location: [facility];  Service: Urology;  Laterality: Right;    WRIST SURGERY        Family History: Family History   Problem Relation Age of Onset    No Known Problems Mother     No Known Problems Father     Hypertension Maternal Grandmother     Hypertension Maternal Grandfather     Hypertension Paternal Grandmother     Diabetes Paternal Grandmother       Social History:  reports that she quit smoking about 2 years ago. Her smoking use included cigarettes. She has never used smokeless tobacco.   reports no history of alcohol use.   reports no history of drug use.        Review of Systems                  Review of Systems   Pertinent items are noted in HPI, all other systems reviewed and negative      Objective     Vital Signs Range for the last 24 hours  Temperature: Temp:  [98.1 °F (36.7 °C)] 98.1 °F (36.7 °C)   Temp Source: Temp src: Oral   BP: BP: (116)/(76) 116/76   Pulse: Heart Rate:  [100-104] 104   Respirations: Resp:  [16] 16   Weight: Weight:  [76.4 kg (168 lb 8 oz)] 76.4 kg (168 lb 8 oz)     Physical Examination: General  appearance - alert, well appearing, and in no distress  Abdomen - soft, nontender, nondistended, no masses or organomegaly  Extremities - peripheral pulses normal, no pedal edema, no clubbing or cyanosis    Presentation: cephalic   Cervix: Exam by: Dr Euceda    Dilation: 3cm    Effacement: Cervical Effacement: 60-70%   Station: -3          Assessment & Plan       Pregnancy      Assessment & Plan    Assessment/Plan:  1.  Intrauterine pregnancy at 34w1d weeks gestation with reactive fetal status.    2.  PPROM- good dating criteria.  D/W MFM at [facility] and recommend proceeding with delivery and GBS prophylaxis.    3.Insufficient prenatal care- UDS ordered and postitive for methamphetamine. States she does not use.  States she was on adderall early in gestation. Will need SS consult.       Radha Euceda DO  7/14/2024  15:16 EDT

## 2024-07-15 PROBLEM — O42.919 PRETERM PREMATURE RUPTURE OF MEMBRANES (PPROM) WITH UNKNOWN ONSET OF LABOR: Status: ACTIVE | Noted: 2024-07-15

## 2024-07-15 PROBLEM — Z34.90 PREGNANCY: Status: ACTIVE | Noted: 2024-07-15

## 2024-07-15 PROBLEM — Z34.90 PREGNANCY: Status: RESOLVED | Noted: 2024-06-26 | Resolved: 2024-07-15

## 2024-07-15 LAB
BACTERIA SPEC AEROBE CULT: NORMAL
RPR SER QL: NORMAL
TREPONEMA PALLIDUM IGG+IGM AB [PRESENCE] IN SERUM OR PLASMA BY IMMUNOASSAY: NORMAL

## 2024-07-15 PROCEDURE — 59025 FETAL NON-STRESS TEST: CPT

## 2024-07-15 PROCEDURE — 25010000002 AMPICILLIN PER 500 MG: Performed by: OBSTETRICS & GYNECOLOGY

## 2024-07-15 PROCEDURE — C1755 CATHETER, INTRASPINAL: HCPCS

## 2024-07-15 PROCEDURE — 25010000002 ROPIVACAINE PER 1 MG: Performed by: NURSE ANESTHETIST, CERTIFIED REGISTERED

## 2024-07-15 PROCEDURE — 25010000002 DIPHENHYDRAMINE PER 50 MG: Performed by: NURSE ANESTHETIST, CERTIFIED REGISTERED

## 2024-07-15 PROCEDURE — 25810000003 LACTATED RINGERS PER 1000 ML: Performed by: OBSTETRICS & GYNECOLOGY

## 2024-07-15 RX ORDER — ONDANSETRON 4 MG/1
4 TABLET, ORALLY DISINTEGRATING ORAL EVERY 8 HOURS PRN
Status: DISCONTINUED | OUTPATIENT
Start: 2024-07-15 | End: 2024-07-17 | Stop reason: HOSPADM

## 2024-07-15 RX ORDER — CARBOPROST TROMETHAMINE 250 UG/ML
250 INJECTION, SOLUTION INTRAMUSCULAR AS NEEDED
Status: DISCONTINUED | OUTPATIENT
Start: 2024-07-15 | End: 2024-07-15 | Stop reason: HOSPADM

## 2024-07-15 RX ORDER — IBUPROFEN 800 MG/1
800 TABLET ORAL 3 TIMES DAILY
Status: DISCONTINUED | OUTPATIENT
Start: 2024-07-15 | End: 2024-07-15

## 2024-07-15 RX ORDER — OXYTOCIN/0.9 % SODIUM CHLORIDE 30/500 ML
999 PLASTIC BAG, INJECTION (ML) INTRAVENOUS ONCE
Status: DISCONTINUED | OUTPATIENT
Start: 2024-07-15 | End: 2024-07-15 | Stop reason: HOSPADM

## 2024-07-15 RX ORDER — MISOPROSTOL 100 UG/1
800 TABLET ORAL AS NEEDED
Status: DISCONTINUED | OUTPATIENT
Start: 2024-07-15 | End: 2024-07-15 | Stop reason: HOSPADM

## 2024-07-15 RX ORDER — METHYLERGONOVINE MALEATE 0.2 MG/ML
200 INJECTION INTRAVENOUS ONCE AS NEEDED
Status: DISCONTINUED | OUTPATIENT
Start: 2024-07-15 | End: 2024-07-15 | Stop reason: HOSPADM

## 2024-07-15 RX ORDER — IBUPROFEN 800 MG/1
800 TABLET ORAL 3 TIMES DAILY
Status: DISCONTINUED | OUTPATIENT
Start: 2024-07-15 | End: 2024-07-17 | Stop reason: HOSPADM

## 2024-07-15 RX ORDER — BISACODYL 10 MG
10 SUPPOSITORY, RECTAL RECTAL DAILY PRN
Status: DISCONTINUED | OUTPATIENT
Start: 2024-07-16 | End: 2024-07-17 | Stop reason: HOSPADM

## 2024-07-15 RX ORDER — OXYTOCIN/0.9 % SODIUM CHLORIDE 30/500 ML
250 PLASTIC BAG, INJECTION (ML) INTRAVENOUS CONTINUOUS
Status: ACTIVE | OUTPATIENT
Start: 2024-07-15 | End: 2024-07-15

## 2024-07-15 RX ORDER — CEPHALEXIN 500 MG/1
500 CAPSULE ORAL 2 TIMES DAILY
COMMUNITY

## 2024-07-15 RX ORDER — DIPHENHYDRAMINE HCL 25 MG
25 CAPSULE ORAL NIGHTLY PRN
Status: DISCONTINUED | OUTPATIENT
Start: 2024-07-15 | End: 2024-07-17 | Stop reason: HOSPADM

## 2024-07-15 RX ORDER — HYDROCORTISONE ACETATE PRAMOXINE HCL 1; 1 G/100G; G/100G
1 CREAM TOPICAL AS NEEDED
Status: DISCONTINUED | OUTPATIENT
Start: 2024-07-15 | End: 2024-07-17 | Stop reason: HOSPADM

## 2024-07-15 RX ORDER — HYDROCORTISONE 25 MG/G
1 CREAM TOPICAL AS NEEDED
Status: DISCONTINUED | OUTPATIENT
Start: 2024-07-15 | End: 2024-07-17 | Stop reason: HOSPADM

## 2024-07-15 RX ORDER — OXYTOCIN/0.9 % SODIUM CHLORIDE 30/500 ML
125 PLASTIC BAG, INJECTION (ML) INTRAVENOUS CONTINUOUS PRN
Status: DISCONTINUED | OUTPATIENT
Start: 2024-07-15 | End: 2024-07-17 | Stop reason: HOSPADM

## 2024-07-15 RX ORDER — DOCUSATE SODIUM 100 MG/1
100 CAPSULE, LIQUID FILLED ORAL 2 TIMES DAILY
Status: DISCONTINUED | OUTPATIENT
Start: 2024-07-15 | End: 2024-07-17 | Stop reason: HOSPADM

## 2024-07-15 RX ORDER — METOCLOPRAMIDE 10 MG/1
10 TABLET ORAL ONCE
Status: DISCONTINUED | OUTPATIENT
Start: 2024-07-15 | End: 2024-07-15

## 2024-07-15 RX ORDER — SODIUM CHLORIDE 0.9 % (FLUSH) 0.9 %
1-10 SYRINGE (ML) INJECTION AS NEEDED
Status: DISCONTINUED | OUTPATIENT
Start: 2024-07-15 | End: 2024-07-17 | Stop reason: HOSPADM

## 2024-07-15 RX ORDER — ACETAMINOPHEN 325 MG/1
650 TABLET ORAL EVERY 6 HOURS PRN
Status: DISCONTINUED | OUTPATIENT
Start: 2024-07-15 | End: 2024-07-17 | Stop reason: HOSPADM

## 2024-07-15 RX ORDER — NIFEDIPINE 30 MG/1
30 TABLET, EXTENDED RELEASE ORAL DAILY
COMMUNITY

## 2024-07-15 RX ORDER — CYCLOBENZAPRINE HCL 10 MG
10 TABLET ORAL 3 TIMES DAILY PRN
Status: DISCONTINUED | OUTPATIENT
Start: 2024-07-15 | End: 2024-07-17 | Stop reason: HOSPADM

## 2024-07-15 RX ADMIN — DOCUSATE SODIUM 100 MG: 100 CAPSULE, LIQUID FILLED ORAL at 20:04

## 2024-07-15 RX ADMIN — AMPICILLIN SODIUM 1000 MG: 1 INJECTION, POWDER, FOR SOLUTION INTRAMUSCULAR; INTRAVENOUS at 04:10

## 2024-07-15 RX ADMIN — ROPIVACAINE HYDROCHLORIDE 14 ML/HR: 2 INJECTION EPIDURAL; INFILTRATION; PERINEURAL at 02:49

## 2024-07-15 RX ADMIN — AMPICILLIN SODIUM 1000 MG: 1 INJECTION, POWDER, FOR SOLUTION INTRAMUSCULAR; INTRAVENOUS at 00:09

## 2024-07-15 RX ADMIN — IBUPROFEN 800 MG: 800 TABLET, FILM COATED ORAL at 18:29

## 2024-07-15 RX ADMIN — ACETAMINOPHEN 650 MG: 325 TABLET ORAL at 11:45

## 2024-07-15 RX ADMIN — DOCUSATE SODIUM 100 MG: 100 CAPSULE, LIQUID FILLED ORAL at 11:45

## 2024-07-15 RX ADMIN — CYCLOBENZAPRINE HYDROCHLORIDE 10 MG: 10 TABLET, FILM COATED ORAL at 20:32

## 2024-07-15 RX ADMIN — DIPHENHYDRAMINE HYDROCHLORIDE 12.5 MG: 50 INJECTION, SOLUTION INTRAMUSCULAR; INTRAVENOUS at 04:36

## 2024-07-15 RX ADMIN — SODIUM CHLORIDE, POTASSIUM CHLORIDE, SODIUM LACTATE AND CALCIUM CHLORIDE 125 ML/HR: 600; 310; 30; 20 INJECTION, SOLUTION INTRAVENOUS at 00:09

## 2024-07-15 RX ADMIN — CYCLOBENZAPRINE HYDROCHLORIDE 10 MG: 10 TABLET, FILM COATED ORAL at 12:31

## 2024-07-15 RX ADMIN — IBUPROFEN 800 MG: 800 TABLET, FILM COATED ORAL at 07:13

## 2024-07-15 RX ADMIN — IBUPROFEN 800 MG: 800 TABLET, FILM COATED ORAL at 20:04

## 2024-07-15 NOTE — PLAN OF CARE
Goal Outcome Evaluation:              Outcome Evaluation: VSS. PT HAD . FUNDUS FIRM AND MIDLINE WITH LIGHT VAGINAL BLEEDING. BABY IN NICU, FAMILY AT BEDSIDE.

## 2024-07-15 NOTE — L&D DELIVERY NOTE
[facility]  Vaginal Delivery Note    Pre-op Diagnosis:  Intrauterine pregnancy at 34w2d    Delivery     Delivery: Vaginal, Spontaneous     YOB: 2025    Time of Birth: 5:27 AM      Anesthesia: Epidural     Delivering clinician: Radha Euceda    Forceps?   No   Vacuum? No    Shoulder dystocia present: No        Delivery narrative:              Pt delivered in the PEREZ position.    Anterior shoulder delivered atraumatically, followed by posterior shoulder.  Infant was placed to mother's chest.  Mouth and nares were bulb suctioned. Cord was doubly clamped and cut.  Cord blood was obtained.  Placenta was delivered spontaneously.  Uterus was firm with fundal massage.        Infant    Findings: male  infant     Infant observations: Weight: No birth weight on file.   Length:   in  Observations/Comments:        Apgars: 9  @ 1 minute /    9  @ 5 minutes   Infant Name:      Placenta, Cord, and Fluid    Placenta delivered  Spontaneous  at  7/15/2024  5:30 AM     Cord: 3 vessels  present.   Nuchal Cord?  no   Cord blood obtained: Yes                   Repair    Episiotomy: None    Lacerations: No   Estimated Blood Loss: 100  mls.         Complications  PPROM    Disposition  Mother to postpartum in stable condition.    Radha Euceda DO  07/15/24  05:46 EDT

## 2024-07-15 NOTE — ANESTHESIA PREPROCEDURE EVALUATION
Anesthesia Evaluation     Patient summary reviewed, Nursing notes reviewed and pregnancy test completed   NPO Solid Status: > 4 hours  NPO Liquid Status: < 2 hours           Airway   Mallampati: II  TM distance: >3 FB  Neck ROM: full  No difficulty expected  Dental - normal exam     Pulmonary - negative pulmonary ROS and normal exam   Cardiovascular - negative cardio ROS  Exercise tolerance: excellent (>7 METS)    Rhythm: regular  Rate: abnormal        Neuro/Psych  (+) psychiatric history  GI/Hepatic/Renal/Endo    (+) renal disease- stones    Musculoskeletal (-) negative ROS    Abdominal    Substance History - negative use     OB/GYN    (+) Pregnant        Other - negative ROS       ROS/Med Hx Other: vapes      Phys Exam Other: Pregnant abd            Anesthesia Plan    ASA 2     CSE       Anesthetic plan, risks, benefits, and alternatives have been provided, discussed and informed consent has been obtained with: patient.  Pre-procedure education provided  Use of blood products discussed with patient  Consented to blood products.      CODE STATUS:    Level Of Support Discussed With: Patient  Code Status (Patient has no pulse and is not breathing): CPR (Attempt to Resuscitate)  Medical Interventions (Patient has pulse or is breathing): Full Support

## 2024-07-15 NOTE — CASE MANAGEMENT/SOCIAL WORK
Case Management/Social Work    Patient Name:  Nicki Schafer  YOB: 1995  MRN: [id number]  Admit Date:  7/14/2024        SS received consult for 34 weeks infant's Mother has h/o drug use. Maternal UDS + amphetamines and methamphetamines. Pt is 28 y/O Nicki Schafer who delivered a viable baby boy weighing 5 pounds, 1.5 ounces and 17.72 inches. Infant was named Hilario Schafer. FOB is Abhi Schafer. Pt lives at [address] [place] [address] with FOB and 3 Y/O Eusebia Loudon.      Pt's UDS results were positive for methamphetamines and amphetamines. Infant's UDS results are negative for all substances. Pt denies substance abuse, states she is prescribed Aderall for ADHD and reports obtaining prescription from Dr. Noris Henning in [place] and also [facility]. SS reviewed Pt's home med list and Aderall is not listed. SS reviewed Pt's chart and last visit with [facility] was 2022. SS made report to Central intake [phone] (Intake ID# [id number]) Report did not meet for investigation. SS was encouraged to make a new report if Infant shows signs of withdrawal or if meconium results are positive when available.      Infant care supplies, including car seat are available. FOB to provide transportation. Pt attended prenatal care at [facility] in [place] and [facility].      SS to follow up with meconium drug screen results when available.       Electronically signed by:  JAIME Rascon  07/15/24 15:43 EDT

## 2024-07-15 NOTE — ANESTHESIA PROCEDURE NOTES
Labor Epidural      Patient reassessed immediately prior to procedure    Patient location during procedure: OB  Start Time: 7/14/2024 8:15 PM  Stop Time: 7/14/2024 8:24 PM  Indication:at surgeon's request  Performed By  CRNA/CAA: Shannon Cheema CRNA  Preanesthetic Checklist  Completed: patient identified, IV checked, site marked, risks and benefits discussed, surgical consent, monitors and equipment checked, pre-op evaluation and timeout performed  Additional Notes  Negative paresthesia / hem / CSF, cathater placed with ease, test dose negative with lido 1.5% - ,000 - 3ml, secured in place. Bolus given and infusion started, pt tolerated well.  Prep:  Pt Position:sitting  Sterile Tech:cap, gloves, mask and sterile barrier  Prep:povidone-iodine 7.5% surgical scrub  Monitoring:blood pressure monitoring and continuous pulse oximetry  Epidural Block Procedure:  Approach:midline  Guidance:landmark technique  Location:L4-L5  Needle Type:Tuohy  Needle Gauge:18 G  Loss of Resistance Medium: air  Loss of Resistance: 8cm  Cath Depth at skin:13 cm  Paresthesia: none  Aspiration:negative  Test Dose:negative  Number of Attempts: 1  Post Assessment:  Dressing:secured with tape and occlusive dressing applied  Pt Tolerance:patient tolerated the procedure well with no apparent complications  Complications:no

## 2024-07-15 NOTE — PLAN OF CARE
Goal Outcome Evaluation:              Outcome Evaluation: VSS. IV FLUIDS INFUSING, ABX GIVEN AO. PT COMFORTABLE WITH EPIDURAL. POC EXPLAINED PT VERBALIZED UNDERSTANDING.

## 2024-07-16 LAB
HCT VFR BLD AUTO: 32.2 % (ref 34–46.6)
HGB BLD-MCNC: 10.1 G/DL (ref 12–15.9)
RUBV IGG SERPL IA-ACNC: 1.07 INDEX

## 2024-07-16 PROCEDURE — 85014 HEMATOCRIT: CPT | Performed by: OBSTETRICS & GYNECOLOGY

## 2024-07-16 PROCEDURE — 85018 HEMOGLOBIN: CPT | Performed by: OBSTETRICS & GYNECOLOGY

## 2024-07-16 RX ADMIN — IBUPROFEN 800 MG: 800 TABLET, FILM COATED ORAL at 08:15

## 2024-07-16 RX ADMIN — CYCLOBENZAPRINE HYDROCHLORIDE 10 MG: 10 TABLET, FILM COATED ORAL at 19:50

## 2024-07-16 RX ADMIN — DOCUSATE SODIUM 100 MG: 100 CAPSULE, LIQUID FILLED ORAL at 20:06

## 2024-07-16 RX ADMIN — IBUPROFEN 800 MG: 800 TABLET, FILM COATED ORAL at 20:06

## 2024-07-16 RX ADMIN — IBUPROFEN 800 MG: 800 TABLET, FILM COATED ORAL at 16:25

## 2024-07-16 RX ADMIN — CYCLOBENZAPRINE HYDROCHLORIDE 10 MG: 10 TABLET, FILM COATED ORAL at 08:15

## 2024-07-16 RX ADMIN — DOCUSATE SODIUM 100 MG: 100 CAPSULE, LIQUID FILLED ORAL at 08:15

## 2024-07-16 NOTE — PLAN OF CARE
Goal Outcome Evaluation:                 Patient's fundus firm and at midline. Bleeding WDL. VSS.

## 2024-07-16 NOTE — PROGRESS NOTES
" [facility]  Vaginal Delivery Progress Note    Subjective   Subjective  Postpartum Day 1: Vaginal Delivery    The patient feels well.  Her pain is well controlled with nonsteroidal anti-inflammatory drugs.   She is ambulating well.  Patient describes her bleeding as thin lochia.    Breastfeeding: infant latching without difficulty.    Objective     Objective:  Vital signs (most recent): Blood pressure 131/84, pulse 88, temperature 98.2 °F (36.8 °C), temperature source Oral, resp. rate 16, height 157.5 cm (62\"), weight 76.4 kg (168 lb 8 oz), last menstrual period 10/25/2023, SpO2 97%, currently breastfeeding.     Vital Signs Range for the last 24 hours  Temperature: Temp:  [98.2 °F (36.8 °C)-98.4 °F (36.9 °C)] 98.2 °F (36.8 °C)   Temp Source: Temp src: Oral   BP: BP: (112-131)/(80-84) 131/84   Pulse: Heart Rate:  [] 88   Respirations: Resp:  [16-20] 16   Weight:       Admit Height:  Height: 157.5 cm (62\")    Physical Exam:  General:  no acute distresss.  Abdomen: Fundus: appropriate, firm, non tender  Extremities: normal, atraumatic, no cyanosis, and trace edema.       [unfilled]       Lab Results   Component Value Date    ABO A 2024    RH Positive 2024        Lab Results   Component Value Date    HGB 10.1 (L) 2024    HCT 32.2 (L) 2024         Assessment & Plan   Assessment & Plan     premature rupture of membranes (PPROM) with unknown onset of labor    Postpartum care following vaginal delivery    Pregnancy      Nicki Schafer is Day 1  post-partum  Vaginal, Spontaneous   .      Plan:  Continue current care.      Kayla Elkins, APRN  2024  08:34 EDT    "

## 2024-07-16 NOTE — PLAN OF CARE
Goal Outcome Evaluation:  Plan of Care Reviewed With: patient        Progress: improving  Outcome Evaluation: small rubra/ voiding without difficulty/

## 2024-07-17 VITALS
HEIGHT: 62 IN | WEIGHT: 168.5 LBS | DIASTOLIC BLOOD PRESSURE: 66 MMHG | BODY MASS INDEX: 31.01 KG/M2 | OXYGEN SATURATION: 97 % | SYSTOLIC BLOOD PRESSURE: 115 MMHG | HEART RATE: 82 BPM | TEMPERATURE: 98.2 F | RESPIRATION RATE: 17 BRPM

## 2024-07-17 PROBLEM — R10.9 CRAMPING AFFECTING PREGNANCY, ANTEPARTUM: Status: RESOLVED | Noted: 2024-06-30 | Resolved: 2024-07-17

## 2024-07-17 PROBLEM — O42.919 PRETERM PREMATURE RUPTURE OF MEMBRANES (PPROM) WITH UNKNOWN ONSET OF LABOR: Status: RESOLVED | Noted: 2024-07-15 | Resolved: 2024-07-17

## 2024-07-17 PROBLEM — Z34.90 PREGNANCY: Status: RESOLVED | Noted: 2024-07-15 | Resolved: 2024-07-17

## 2024-07-17 PROBLEM — O26.899 CRAMPING AFFECTING PREGNANCY, ANTEPARTUM: Status: RESOLVED | Noted: 2024-06-30 | Resolved: 2024-07-17

## 2024-07-17 PROBLEM — O42.90 AMNIOTIC FLUID LEAKING: Status: RESOLVED | Noted: 2024-07-09 | Resolved: 2024-07-17

## 2024-07-17 RX ORDER — IBUPROFEN 800 MG/1
800 TABLET ORAL EVERY 6 HOURS PRN
Qty: 30 TABLET | Refills: 0 | Status: SHIPPED | OUTPATIENT
Start: 2024-07-17

## 2024-07-17 RX ADMIN — DOCUSATE SODIUM 100 MG: 100 CAPSULE, LIQUID FILLED ORAL at 08:56

## 2024-07-17 RX ADMIN — IBUPROFEN 800 MG: 800 TABLET, FILM COATED ORAL at 08:56

## 2024-07-17 NOTE — PLAN OF CARE
Goal Outcome Evaluation:  Plan of Care Reviewed With: patient        Progress: improving  Outcome Evaluation: small rubra/ has had bm/ voiding without difficulty/ ambulating well   vitals stable

## 2024-07-17 NOTE — PAYOR COMM NOTE
"PATIENT DISCHARGED TO HOME ON 1724    REFER TO AUTH # [id number]     Aria Grier (29 y.o. Female)       Date of Birth   1995    Social Security Number       Address   [address] [id number]    Home Phone   [phone]    MRN   [id number]       Latter-day   None    Marital Status                               Admission Date   24    Admission Type   Elective    Admitting Provider   Radha Euceda DO    Attending Provider       Department, Room/Bed   [facility], W234/1       Discharge Date   2024    Discharge Disposition   Home or Self Care    Discharge Destination                                 Attending Provider: (none)   Allergies: Morphine    Isolation: None   Infection: None   Code Status: CPR    Ht: 157.5 cm (62\")   Wt: 76.4 kg (168 lb 8 oz)    Admission Cmt: None   Principal Problem: None                  Active Insurance as of 2024       Primary Coverage       Payor Plan Insurance Group Employer/Plan Group    [facility] COMMUNITY PLAN [facility] COMMUNITY PLAN [facility]       Payor Plan Address Payor Plan Phone Number Payor Plan Fax Number Effective Dates    [address]   2024 - None Entered    [facility] [id number]         Subscriber Name Subscriber Birth Date Member ID       ARIA GRIER 1995 [id number]                     Emergency Contacts        (Rel.) Home Phone Work Phone Mobile Phone    jay grier (Spouse) [phone] -- --                 Discharge Summary        Barry Alvarado III, MD at 24 0823              Discharge Summary: Vaginal Delivery     Admit Date: 2024  1:54 PM    Admit Diagnosis: Pregnancy [Z34.90]    Date of Discharge:  2024    Discharge Diagnosis: Same        Hospital Course  Patient is a 29 y.o. female, G 3, now P 112. S/P . PPD#2. Patient doing well. No complaints. Patient tolerating a regular diet and ambulating without difficulty. Will discharge to " home today.     Procedures Performed  Normal Spontaneous Vaginal Delivery         Vital Signs  Temp:  [98.2 °F (36.8 °C)] 98.2 °F (36.8 °C)  Heart Rate:  [82] 82  Resp:  [17] 17  BP: (115)/(66) 115/66    Review of Systems    The following systems were reviewed and negative;  ENT, respiratory, cardiovascular, gastrointestinal, genitourinary, breast, endocrine and allergies / immunologic.      Physical Exam:      General Appearance:    Alert, cooperative, in no acute distress   Head:    Normocephalic, without obvious abnormality, atraumatic   Eyes:            Lids and lashes normal, conjunctivae and sclerae normal, no   icterus, no pallor, corneas clear, PERRLA   Ears:    Ears appear intact with no abnormalities noted   Throat:   No oral lesions, no thrush, oral mucosa moist   Neck:   No adenopathy, supple, trachea midline, no thyromegaly, no     carotid bruit, no JVD   Back:     No kyphosis present, no scoliosis present, no skin lesions,       erythema or scars, no tenderness to percussion or                   palpation,   range of motion normal   Lungs:     Clear to auscultation,respirations regular, even and                   unlabored    Heart:    Regular rhythm and normal rate, normal S1 and S2, no            murmur, no gallop, no rub, no click   Breast Exam:    Deferred   Abdomen:     Normal bowel sounds, no masses, no organomegaly, soft        non-tender, non-distended, no guarding, no rebound                 tenderness   Genitalia:    Deferred   Extremities:   Moves all extremities well, no edema, no cyanosis, no              redness   Pulses:   Pulses palpable and equal bilaterally   Skin:   No bleeding, bruising or rash   Lymph nodes:   No palpable adenopathy   Neurologic:   Cranial nerves 2 - 12 grossly intact, sensation intact, DTR        present and equal bilaterally             Condition on Discharge:  Stable    Urine Output Good    Discharge Diet: Regular    Discharge Medications  Motrin 800 mg q 6  #30    Activity at Discharge: No driving x 2 weeks. Nothing per vagina until cleared by a physician. Patient expected to return to work or school in 6 weeks.     Follow-up Appointments  Patient will follow up with Dr. Flores in 2 weeks.        Barry Alvarado MD.   07/17/24  08:23 EDT            Electronically signed by Barry Alvarado III, MD at 07/17/24 0863

## 2024-07-17 NOTE — PLAN OF CARE
Goal Outcome Evaluation:  Plan of Care Reviewed With: patient        Progress: improving  Outcome Evaluation: Patient voiding spontaneously. Ambulating independently. Fundus firm and at midline. Bleeding WDL. VSS.

## 2024-07-17 NOTE — DISCHARGE SUMMARY
Discharge Summary: Vaginal Delivery     Admit Date: 2024  1:54 PM    Admit Diagnosis: Pregnancy [Z34.90]    Date of Discharge:  2024    Discharge Diagnosis: Same        Hospital Course  Patient is a 29 y.o. female, G 3, now P 112. S/P . PPD#2. Patient doing well. No complaints. Patient tolerating a regular diet and ambulating without difficulty. Will discharge to home today.     Procedures Performed  Normal Spontaneous Vaginal Delivery         Vital Signs  Temp:  [98.2 °F (36.8 °C)] 98.2 °F (36.8 °C)  Heart Rate:  [82] 82  Resp:  [17] 17  BP: (115)/(66) 115/66    Review of Systems    The following systems were reviewed and negative;  ENT, respiratory, cardiovascular, gastrointestinal, genitourinary, breast, endocrine and allergies / immunologic.      Physical Exam:      General Appearance:    Alert, cooperative, in no acute distress   Head:    Normocephalic, without obvious abnormality, atraumatic   Eyes:            Lids and lashes normal, conjunctivae and sclerae normal, no   icterus, no pallor, corneas clear, PERRLA   Ears:    Ears appear intact with no abnormalities noted   Throat:   No oral lesions, no thrush, oral mucosa moist   Neck:   No adenopathy, supple, trachea midline, no thyromegaly, no     carotid bruit, no JVD   Back:     No kyphosis present, no scoliosis present, no skin lesions,       erythema or scars, no tenderness to percussion or                   palpation,   range of motion normal   Lungs:     Clear to auscultation,respirations regular, even and                   unlabored    Heart:    Regular rhythm and normal rate, normal S1 and S2, no            murmur, no gallop, no rub, no click   Breast Exam:    Deferred   Abdomen:     Normal bowel sounds, no masses, no organomegaly, soft        non-tender, non-distended, no guarding, no rebound                 tenderness   Genitalia:    Deferred   Extremities:   Moves all extremities well, no edema, no cyanosis, no              redness    Pulses:   Pulses palpable and equal bilaterally   Skin:   No bleeding, bruising or rash   Lymph nodes:   No palpable adenopathy   Neurologic:   Cranial nerves 2 - 12 grossly intact, sensation intact, DTR        present and equal bilaterally             Condition on Discharge:  Stable    Urine Output Good    Discharge Diet: Regular    Discharge Medications  Motrin 800 mg q 6 #30    Activity at Discharge: No driving x 2 weeks. Nothing per vagina until cleared by a physician. Patient expected to return to work or school in 6 weeks.     Follow-up Appointments  Patient will follow up with Dr. Flores in 2 weeks.        Barry Alvarado MD.   07/17/24  08:23 EDT

## 2024-07-17 NOTE — DISCHARGE INSTRUCTIONS
Limit lifting and pushing to about 15 lbs for 6 weeks.  You may shower but no tub baths or submersion in water for 6 weeks.  No tampons, douching or intercourse for 6 weeks.  Notify your doctor for fever, chills, worsening abdominal pain, vaginal bleeding that soaks a small pad in hour or less, passing clots, swelling in your hands face or feet or visual changes such as blurry or spotty vision, or headache that dont resolve with rest or tylenol.   See attached education sheets.

## 2024-07-18 LAB — REF LAB TEST METHOD: NORMAL

## 2024-07-26 ENCOUNTER — MATERNAL SCREENING (OUTPATIENT)
Dept: CALL CENTER | Facility: HOSPITAL | Age: 29
End: 2024-07-26
Payer: MEDICAID

## 2024-07-26 NOTE — OUTREACH NOTE
Maternal Screening Survey      Flowsheet Row Responses   Facility patient discharged from? [facility]   Attempt successful? No   Unsuccessful attempts Attempt 1              Mike SERRANO - Registered Nurse

## 2024-07-26 NOTE — OUTREACH NOTE
Maternal Screening Survey      Flowsheet Row Responses   Facility patient discharged from? [facility]   Attempt successful? No   Unsuccessful attempts Attempt 2              Mike SERRANO - Registered Nurse

## 2024-07-27 ENCOUNTER — MATERNAL SCREENING (OUTPATIENT)
Dept: CALL CENTER | Facility: HOSPITAL | Age: 29
End: 2024-07-27
Payer: MEDICAID

## 2024-07-27 NOTE — OUTREACH NOTE
Maternal Screening Survey      Flowsheet Row Responses   Facility patient discharged from? [facility]   Attempt successful? No   Unsuccessful attempts Attempt 3   Revoke Decline to participate              BEVERLY MCCLOUD - Registered Nurse

## 2024-12-15 NOTE — PLAN OF CARE
Problem: Adult Inpatient Plan of Care  Goal: Absence of Hospital-Acquired Illness or Injury  Intervention: Identify and Manage Fall Risk  Recent Flowsheet Documentation  Taken 7/15/2024 0805 by Zohra Mensah RN  Safety Promotion/Fall Prevention: safety round/check completed  Intervention: Prevent and Manage VTE (Venous Thromboembolism) Risk  Recent Flowsheet Documentation  Taken 7/15/2024 0805 by Zohra Mensah, RN  Activity Management: up ad west  Goal: Optimal Comfort and Wellbeing  Intervention: Monitor Pain and Promote Comfort  Recent Flowsheet Documentation  Taken 7/15/2024 0805 by Zohra Mensah RN  Pain Management Interventions: see MAR  Intervention: Provide Person-Centered Care  Recent Flowsheet Documentation  Taken 7/15/2024 0805 by Zohra Mensah RN  Trust Relationship/Rapport:   care explained   choices provided   emotional support provided   empathic listening provided   questions answered   questions encouraged   reassurance provided   thoughts/feelings acknowledged   Goal Outcome Evaluation:                                               ABDOMINAL PAIN

## 2025-02-14 ENCOUNTER — TELEPHONE (OUTPATIENT)
Dept: UROLOGY | Facility: CLINIC | Age: 30
End: 2025-02-14
Payer: MEDICAID

## 2025-02-14 NOTE — TELEPHONE ENCOUNTER
Patient called and wanted to know if she could please have a refill on her pain medication. She stated that she is starting to have a lot of pain in her bladder.

## 2025-02-16 ENCOUNTER — HOSPITAL ENCOUNTER (EMERGENCY)
Facility: HOSPITAL | Age: 30
Discharge: HOME | End: 2025-02-16
Attending: EMERGENCY MEDICINE | Admitting: EMERGENCY MEDICINE
Payer: MEDICAID

## 2025-02-16 VITALS
HEIGHT: 62 IN | OXYGEN SATURATION: 97 % | TEMPERATURE: 98 F | WEIGHT: 150 LBS | HEART RATE: 102 BPM | SYSTOLIC BLOOD PRESSURE: 123 MMHG | DIASTOLIC BLOOD PRESSURE: 84 MMHG | RESPIRATION RATE: 12 BRPM | BODY MASS INDEX: 27.6 KG/M2

## 2025-02-16 DIAGNOSIS — K04.7 DENTAL ABSCESS: Primary | ICD-10-CM

## 2025-02-16 PROCEDURE — 25010000002 KETOROLAC TROMETHAMINE PER 15 MG

## 2025-02-16 PROCEDURE — 99283 EMERGENCY DEPT VISIT LOW MDM: CPT

## 2025-02-16 PROCEDURE — 96372 THER/PROPH/DIAG INJ SC/IM: CPT

## 2025-02-16 RX ORDER — KETOROLAC TROMETHAMINE 10 MG/1
10 TABLET, FILM COATED ORAL EVERY 6 HOURS PRN
Qty: 15 TABLET | Refills: 0 | Status: SHIPPED | OUTPATIENT
Start: 2025-02-16

## 2025-02-16 RX ORDER — CEPHALEXIN 500 MG/1
500 CAPSULE ORAL 4 TIMES DAILY
Qty: 40 CAPSULE | Refills: 0 | Status: SHIPPED | OUTPATIENT
Start: 2025-02-16

## 2025-02-16 RX ORDER — KETOROLAC TROMETHAMINE 30 MG/ML
60 INJECTION, SOLUTION INTRAMUSCULAR; INTRAVENOUS ONCE
Status: COMPLETED | OUTPATIENT
Start: 2025-02-16 | End: 2025-02-16

## 2025-02-16 RX ORDER — CLINDAMYCIN HYDROCHLORIDE 150 MG/1
600 CAPSULE ORAL ONCE
Status: COMPLETED | OUTPATIENT
Start: 2025-02-16 | End: 2025-02-16

## 2025-02-16 RX ADMIN — KETOROLAC TROMETHAMINE 60 MG: 30 INJECTION, SOLUTION INTRAMUSCULAR; INTRAVENOUS at 14:16

## 2025-02-16 RX ADMIN — CLINDAMYCIN HYDROCHLORIDE 600 MG: 150 CAPSULE ORAL at 14:16

## 2025-02-16 NOTE — ED PROVIDER NOTES
Subjective   History of Present Illness  Patient is a 29-year-old female who presents today with complaints of left lower jaw pain.  She reports that she was having swelling earlier this morning, no swelling upon assessment today.  Patient does have poor dentition and what appears to be decaying tooth to the left bottom jaw.  She denies any known fever and is afebrile upon arrival.  She denies any other complaints.  She presents private vehicle.        Review of Systems   Constitutional: Negative.  Negative for fever.   HENT:  Positive for dental problem.    Respiratory: Negative.     Cardiovascular: Negative.  Negative for chest pain.   Gastrointestinal: Negative.  Negative for abdominal pain.   Endocrine: Negative.    Genitourinary: Negative.  Negative for dysuria.   Skin: Negative.    Neurological: Negative.    Psychiatric/Behavioral: Negative.     All other systems reviewed and are negative.      Past Medical History:   Diagnosis Date    ADHD (attention deficit hyperactivity disorder)     Anxiety     Depression     Kidney stones        Allergies   Allergen Reactions    Morphine Hives       Past Surgical History:   Procedure Laterality Date    URETEROSCOPY STENT INSERTION Right 11/6/2023    Procedure: URETEROSCOPY LASER LITHOTRIPSY WITH STENT INSERTION;  Surgeon: Farhad Allen MD;  [facility];  Service: Urology;  Laterality: Right;    WRIST SURGERY         Family History   Problem Relation Age of Onset    No Known Problems Mother     No Known Problems Father     Hypertension Maternal Grandmother     Hypertension Maternal Grandfather     Hypertension Paternal Grandmother     Diabetes Paternal Grandmother        Social History     Socioeconomic History    Marital status:    Tobacco Use    Smoking status: Former     Current packs/day: 0.00     Types: Cigarettes     Quit date: 2022     Years since quitting: 3.1    Smokeless tobacco: Never   Vaping Use    Vaping status: Former    Passive  vaping exposure: Yes   Substance and Sexual Activity    Alcohol use: No    Drug use: No    Sexual activity: Yes     Partners: Male     Birth control/protection: None           Objective   Physical Exam  Vitals and nursing note reviewed.   Constitutional:       General: She is not in acute distress.     Appearance: She is well-developed. She is not diaphoretic.   HENT:      Head: Normocephalic and atraumatic.      Right Ear: External ear normal.      Left Ear: External ear normal.      Nose: Nose normal.      Mouth/Throat:      Dentition: Dental tenderness and dental caries present.   Eyes:      Conjunctiva/sclera: Conjunctivae normal.      Pupils: Pupils are equal, round, and reactive to light.   Neck:      Vascular: No JVD.      Trachea: No tracheal deviation.   Cardiovascular:      Rate and Rhythm: Normal rate and regular rhythm.      Heart sounds: Normal heart sounds. No murmur heard.  Pulmonary:      Effort: Pulmonary effort is normal. No respiratory distress.      Breath sounds: Normal breath sounds. No wheezing.   Abdominal:      General: Bowel sounds are normal.      Palpations: Abdomen is soft.      Tenderness: There is no abdominal tenderness.   Musculoskeletal:         General: No deformity. Normal range of motion.      Cervical back: Normal range of motion and neck supple.   Skin:     General: Skin is warm and dry.      Coloration: Skin is not pale.      Findings: No erythema or rash.   Neurological:      Mental Status: She is alert and oriented to person, place, and time.      Cranial Nerves: No cranial nerve deficit.   Psychiatric:         Behavior: Behavior normal.         Thought Content: Thought content normal.         Procedures           ED Course                                                       Medical Decision Making  Patient is a 29-year-old female who presents today with complaints of left lower jaw pain.  She reports that she was having swelling earlier this morning, no swelling upon  assessment today.  Patient does have poor dentition and what appears to be decaying tooth to the left bottom jaw.  She denies any known fever and is afebrile upon arrival.  She denies any other complaints.  She presents private vehicle.    Problems Addressed:  Dental abscess: complicated acute illness or injury    Risk  Prescription drug management.        Final diagnoses:   Dental abscess       ED Disposition  ED Disposition       ED Disposition   Discharge    Condition   Stable    Comment   --               Yovana Robles, APRN  [address]  [place] [id number]  [phone]    Schedule an appointment as soon as possible for a visit   As needed    [facility] EMERGENCY DEPARTMENT  [address] [facility] [address] [phone]  Go to   If symptoms worsen         Medication List        New Prescriptions      ketorolac 10 MG tablet  Commonly known as: TORADOL  Take 1 tablet by mouth Every 6 (Six) Hours As Needed for Mild Pain.            Changed      cephalexin 500 MG capsule  Commonly known as: KEFLEX  Take 1 capsule by mouth 4 (Four) Times a Day.  What changed: when to take this               Where to Get Your Medications        These medications were sent to [facility] - [place], [place] - [address] [facility] [address] - [phone]  - [phone] FX  [address], [facility] [address]      Phone: [phone]   cephalexin 500 MG capsule  ketorolac 10 MG tablet            Abigail Li, APRN  02/16/25 1929

## 2025-06-14 ENCOUNTER — APPOINTMENT (OUTPATIENT)
Dept: CT IMAGING | Facility: HOSPITAL | Age: 30
End: 2025-06-14
Payer: MEDICAID

## 2025-06-14 ENCOUNTER — HOSPITAL ENCOUNTER (EMERGENCY)
Facility: HOSPITAL | Age: 30
Discharge: HOME | End: 2025-06-14
Attending: STUDENT IN AN ORGANIZED HEALTH CARE EDUCATION/TRAINING PROGRAM
Payer: MEDICAID

## 2025-06-14 ENCOUNTER — APPOINTMENT (OUTPATIENT)
Dept: GENERAL RADIOLOGY | Facility: HOSPITAL | Age: 30
End: 2025-06-14
Payer: MEDICAID

## 2025-06-14 VITALS
RESPIRATION RATE: 17 BRPM | TEMPERATURE: 98.1 F | BODY MASS INDEX: 27.6 KG/M2 | SYSTOLIC BLOOD PRESSURE: 112 MMHG | HEIGHT: 62 IN | OXYGEN SATURATION: 99 % | DIASTOLIC BLOOD PRESSURE: 78 MMHG | WEIGHT: 150 LBS | HEART RATE: 89 BPM

## 2025-06-14 DIAGNOSIS — M50.30 DDD (DEGENERATIVE DISC DISEASE), CERVICAL: ICD-10-CM

## 2025-06-14 DIAGNOSIS — M79.18 MUSCULOSKELETAL PAIN: ICD-10-CM
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
## 2025-06-14 PROCEDURE — 25010000002 ORPHENADRINE CITRATE PER 60 MG: Performed by: NURSE PRACTITIONER

## 2025-06-14 PROCEDURE — 72128 CT CHEST SPINE W/O DYE: CPT

## 2025-06-14 PROCEDURE — 72128 CT CHEST SPINE W/O DYE: CPT | Performed by: RADIOLOGY

## 2025-06-14 PROCEDURE — 70450 CT HEAD/BRAIN W/O DYE: CPT

## 2025-06-14 PROCEDURE — 72125 CT NECK SPINE W/O DYE: CPT | Performed by: RADIOLOGY

## 2025-06-14 PROCEDURE — 72131 CT LUMBAR SPINE W/O DYE: CPT | Performed by: RADIOLOGY

## 2025-06-14 PROCEDURE — 73502 X-RAY EXAM HIP UNI 2-3 VIEWS: CPT | Performed by: RADIOLOGY

## 2025-06-14 PROCEDURE — 73030 X-RAY EXAM OF SHOULDER: CPT | Performed by: RADIOLOGY

## 2025-06-14 PROCEDURE — 63710000001 ONDANSETRON ODT 4 MG TABLET DISPERSIBLE: Performed by: NURSE PRACTITIONER

## 2025-06-14 PROCEDURE — 99284 EMERGENCY DEPT VISIT MOD MDM: CPT

## 2025-06-14 PROCEDURE — 96372 THER/PROPH/DIAG INJ SC/IM: CPT

## 2025-06-14 PROCEDURE — 70450 CT HEAD/BRAIN W/O DYE: CPT | Performed by: RADIOLOGY

## 2025-06-14 PROCEDURE — 73502 X-RAY EXAM HIP UNI 2-3 VIEWS: CPT

## 2025-06-14 PROCEDURE — 72125 CT NECK SPINE W/O DYE: CPT

## 2025-06-14 PROCEDURE — 72131 CT LUMBAR SPINE W/O DYE: CPT

## 2025-06-14 PROCEDURE — 73030 X-RAY EXAM OF SHOULDER: CPT

## 2025-06-14 RX ORDER — TIZANIDINE HYDROCHLORIDE 4 MG/1
4 CAPSULE, GELATIN COATED ORAL 3 TIMES DAILY
Qty: 15 CAPSULE | Refills: 0 | Status: SHIPPED | OUTPATIENT
Start: 2025-06-14 | End: 2025-06-18

## 2025-06-14 RX ORDER — ORPHENADRINE CITRATE 30 MG/ML
60 INJECTION INTRAMUSCULAR; INTRAVENOUS ONCE
Status: COMPLETED | OUTPATIENT
Start: 2025-06-14 | End: 2025-06-14

## 2025-06-14 RX ORDER — ONDANSETRON 4 MG/1
4 TABLET, ORALLY DISINTEGRATING ORAL ONCE
Status: COMPLETED | OUTPATIENT
Start: 2025-06-14 | End: 2025-06-14

## 2025-06-14 RX ORDER — KETOROLAC TROMETHAMINE 10 MG/1
10 TABLET, FILM COATED ORAL EVERY 6 HOURS PRN
Qty: 20 TABLET | Refills: 0 | Status: SHIPPED | OUTPATIENT
Start: 2025-06-14 | End: 2025-06-18

## 2025-06-14 RX ORDER — OXYCODONE AND ACETAMINOPHEN 10; 325 MG/1; MG/1
1 TABLET ORAL ONCE
Refills: 0 | Status: COMPLETED | OUTPATIENT
Start: 2025-06-14 | End: 2025-06-14

## 2025-06-14 RX ADMIN — ONDANSETRON 4 MG: 4 TABLET, ORALLY DISINTEGRATING ORAL at 13:19

## 2025-06-14 RX ADMIN — OXYCODONE AND ACETAMINOPHEN 1 TABLET: 10; 325 TABLET ORAL at 13:19

## 2025-06-14 RX ADMIN — ORPHENADRINE CITRATE 60 MG: 30 INJECTION, SOLUTION INTRAMUSCULAR; INTRAVENOUS at 13:19
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
## 2025-06-15 ENCOUNTER — HOSPITAL ENCOUNTER (EMERGENCY)
Facility: HOSPITAL | Age: 30
Discharge: HOME | End: 2025-06-15
Attending: STUDENT IN AN ORGANIZED HEALTH CARE EDUCATION/TRAINING PROGRAM | Admitting: STUDENT IN AN ORGANIZED HEALTH CARE EDUCATION/TRAINING PROGRAM
Payer: MEDICAID

## 2025-06-15 VITALS
TEMPERATURE: 97.8 F | BODY MASS INDEX: 27.6 KG/M2 | HEIGHT: 62 IN | HEART RATE: 95 BPM | DIASTOLIC BLOOD PRESSURE: 79 MMHG | OXYGEN SATURATION: 99 % | SYSTOLIC BLOOD PRESSURE: 123 MMHG | RESPIRATION RATE: 14 BRPM | WEIGHT: 150 LBS

## 2025-06-15 DIAGNOSIS — S39.012A STRAIN OF LUMBAR REGION, INITIAL ENCOUNTER: Primary | ICD-10-CM

## 2025-06-15 PROCEDURE — 96372 THER/PROPH/DIAG INJ SC/IM: CPT

## 2025-06-15 PROCEDURE — 99283 EMERGENCY DEPT VISIT LOW MDM: CPT

## 2025-06-15 PROCEDURE — 25010000002 KETOROLAC TROMETHAMINE PER 15 MG: Performed by: NURSE PRACTITIONER

## 2025-06-15 RX ORDER — HYDROCODONE BITARTRATE AND ACETAMINOPHEN 5; 325 MG/1; MG/1
1 TABLET ORAL ONCE
Refills: 0 | Status: COMPLETED | OUTPATIENT
Start: 2025-06-15 | End: 2025-06-15

## 2025-06-15 RX ORDER — KETOROLAC TROMETHAMINE 30 MG/ML
60 INJECTION, SOLUTION INTRAMUSCULAR; INTRAVENOUS ONCE
Status: COMPLETED | OUTPATIENT
Start: 2025-06-15 | End: 2025-06-15

## 2025-06-15 RX ADMIN — KETOROLAC TROMETHAMINE 60 MG: 60 INJECTION, SOLUTION INTRAMUSCULAR at 12:08

## 2025-06-15 RX ADMIN — HYDROCODONE BITARTRATE AND ACETAMINOPHEN 1 TABLET: 5; 325 TABLET ORAL at 12:07
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
## 2025-06-15 NOTE — DISCHARGE INSTRUCTIONS

## 2025-06-18 ENCOUNTER — HOSPITAL ENCOUNTER (EMERGENCY)
Facility: HOSPITAL | Age: 30
Discharge: HOME | End: 2025-06-18
Attending: STUDENT IN AN ORGANIZED HEALTH CARE EDUCATION/TRAINING PROGRAM | Admitting: STUDENT IN AN ORGANIZED HEALTH CARE EDUCATION/TRAINING PROGRAM
Payer: MEDICAID

## 2025-06-18 ENCOUNTER — APPOINTMENT (OUTPATIENT)
Dept: ULTRASOUND IMAGING | Facility: HOSPITAL | Age: 30
End: 2025-06-18
Payer: MEDICAID

## 2025-06-18 ENCOUNTER — APPOINTMENT (OUTPATIENT)
Dept: CT IMAGING | Facility: HOSPITAL | Age: 30
End: 2025-06-18
Payer: MEDICAID

## 2025-06-18 VITALS
OXYGEN SATURATION: 95 % | HEART RATE: 77 BPM | RESPIRATION RATE: 15 BRPM | TEMPERATURE: 98.1 F | DIASTOLIC BLOOD PRESSURE: 83 MMHG | HEIGHT: 62 IN | WEIGHT: 150 LBS | SYSTOLIC BLOOD PRESSURE: 119 MMHG | BODY MASS INDEX: 27.6 KG/M2

## 2025-06-18 DIAGNOSIS — K59.00 CONSTIPATION, UNSPECIFIED CONSTIPATION TYPE: ICD-10-CM

## 2025-06-18 DIAGNOSIS — K80.20 CALCULUS OF GALLBLADDER WITHOUT CHOLECYSTITIS WITHOUT OBSTRUCTION: ICD-10-CM

## 2025-06-18 DIAGNOSIS — R11.2 NAUSEA AND VOMITING, UNSPECIFIED VOMITING TYPE: Primary | ICD-10-CM

## 2025-06-18 LAB
ALBUMIN SERPL-MCNC: 4.1 G/DL (ref 3.5–5.2)
ALBUMIN/GLOB SERPL: 1.3 G/DL
ALP SERPL-CCNC: 64 U/L (ref 39–117)
ALT SERPL W P-5'-P-CCNC: 27 U/L (ref 1–33)
AMPHET+METHAMPHET UR QL: NEGATIVE
AMPHETAMINES UR QL: NEGATIVE
ANION GAP SERPL CALCULATED.3IONS-SCNC: 14.5 MMOL/L (ref 5–15)
AST SERPL-CCNC: 39 U/L (ref 1–32)
BACTERIA UR QL AUTO: ABNORMAL /HPF
BARBITURATES UR QL SCN: NEGATIVE
BASOPHILS # BLD AUTO: 0.08 10*3/MM3 (ref 0–0.2)
BASOPHILS NFR BLD AUTO: 0.4 % (ref 0–1.5)
BENZODIAZ UR QL SCN: NEGATIVE
BILIRUB SERPL-MCNC: 0.4 MG/DL (ref 0–1.2)
BILIRUB UR QL STRIP: NEGATIVE
BUN SERPL-MCNC: 16.7 MG/DL (ref 6–20)
BUN/CREAT SERPL: 21.4 (ref 7–25)
BUPRENORPHINE SERPL-MCNC: NEGATIVE NG/ML
CALCIUM SPEC-SCNC: 8.9 MG/DL (ref 8.6–10.5)
CANNABINOIDS SERPL QL: NEGATIVE
CHLORIDE SERPL-SCNC: 105 MMOL/L (ref 98–107)
CLARITY UR: CLEAR
CO2 SERPL-SCNC: 16.5 MMOL/L (ref 22–29)
COCAINE UR QL: NEGATIVE
COLOR UR: YELLOW
CREAT SERPL-MCNC: 0.78 MG/DL (ref 0.57–1)
CRP SERPL-MCNC: <0.3 MG/DL (ref 0–0.5)
D-LACTATE SERPL-SCNC: 1.8 MMOL/L (ref 0.5–2)
DEPRECATED RDW RBC AUTO: 41.1 FL (ref 37–54)
EGFRCR SERPLBLD CKD-EPI 2021: 104.9 ML/MIN/1.73
EOSINOPHIL # BLD AUTO: 0.36 10*3/MM3 (ref 0–0.4)
EOSINOPHIL NFR BLD AUTO: 1.9 % (ref 0.3–6.2)
ERYTHROCYTE [DISTWIDTH] IN BLOOD BY AUTOMATED COUNT: 12.3 % (ref 12.3–15.4)
ERYTHROCYTE [SEDIMENTATION RATE] IN BLOOD: 16 MM/HR (ref 0–20)
FENTANYL UR-MCNC: NEGATIVE NG/ML
GLOBULIN UR ELPH-MCNC: 3.2 GM/DL
GLUCOSE SERPL-MCNC: 99 MG/DL (ref 65–99)
GLUCOSE UR STRIP-MCNC: NEGATIVE MG/DL
HCG SERPL QL: NEGATIVE
HCT VFR BLD AUTO: 45.9 % (ref 34–46.6)
HGB BLD-MCNC: 15.2 G/DL (ref 12–15.9)
HGB UR QL STRIP.AUTO: ABNORMAL
HOLD SPECIMEN: NORMAL
HOLD SPECIMEN: NORMAL
HYALINE CASTS UR QL AUTO: ABNORMAL /LPF
IMM GRANULOCYTES # BLD AUTO: 0.07 10*3/MM3 (ref 0–0.05)
IMM GRANULOCYTES NFR BLD AUTO: 0.4 % (ref 0–0.5)
KETONES UR QL STRIP: ABNORMAL
LEUKOCYTE ESTERASE UR QL STRIP.AUTO: ABNORMAL
LIPASE SERPL-CCNC: 27 U/L (ref 13–60)
LYMPHOCYTES # BLD AUTO: 1.38 10*3/MM3 (ref 0.7–3.1)
LYMPHOCYTES NFR BLD AUTO: 7.3 % (ref 19.6–45.3)
MAGNESIUM SERPL-MCNC: 2.3 MG/DL (ref 1.6–2.6)
MCH RBC QN AUTO: 30.2 PG (ref 26.6–33)
MCHC RBC AUTO-ENTMCNC: 33.1 G/DL (ref 31.5–35.7)
MCV RBC AUTO: 91.3 FL (ref 79–97)
METHADONE UR QL SCN: NEGATIVE
MONOCYTES # BLD AUTO: 0.97 10*3/MM3 (ref 0.1–0.9)
MONOCYTES NFR BLD AUTO: 5.1 % (ref 5–12)
NEUTROPHILS NFR BLD AUTO: 16.11 10*3/MM3 (ref 1.7–7)
NEUTROPHILS NFR BLD AUTO: 84.9 % (ref 42.7–76)
NITRITE UR QL STRIP: NEGATIVE
NRBC BLD AUTO-RTO: 0 /100 WBC (ref 0–0.2)
OPIATES UR QL: NEGATIVE
OXYCODONE UR QL SCN: NEGATIVE
PCP UR QL SCN: NEGATIVE
PH UR STRIP.AUTO: 5.5 [PH] (ref 5–8)
PLATELET # BLD AUTO: 387 10*3/MM3 (ref 140–450)
PMV BLD AUTO: 10.9 FL (ref 6–12)
POTASSIUM SERPL-SCNC: 4.6 MMOL/L (ref 3.5–5.2)
POTASSIUM SERPL-SCNC: 5.4 MMOL/L (ref 3.5–5.2)
PROCALCITONIN SERPL-MCNC: 0.04 NG/ML (ref 0–0.25)
PROT SERPL-MCNC: 7.3 G/DL (ref 6–8.5)
PROT UR QL STRIP: ABNORMAL
RBC # BLD AUTO: 5.03 10*6/MM3 (ref 3.77–5.28)
RBC # UR STRIP: ABNORMAL /HPF
REF LAB TEST METHOD: ABNORMAL
SODIUM SERPL-SCNC: 136 MMOL/L (ref 136–145)
SP GR UR STRIP: 1.02 (ref 1–1.03)
SQUAMOUS #/AREA URNS HPF: ABNORMAL /HPF
TRICYCLICS UR QL SCN: NEGATIVE
UROBILINOGEN UR QL STRIP: ABNORMAL
WBC # UR STRIP: ABNORMAL /HPF
WBC NRBC COR # BLD AUTO: 18.97 10*3/MM3 (ref 3.4–10.8)
WHOLE BLOOD HOLD COAG: NORMAL
WHOLE BLOOD HOLD SPECIMEN: NORMAL

## 2025-06-18 PROCEDURE — 25010000002 PROCHLORPERAZINE 10 MG/2ML SOLUTION: Performed by: PHYSICIAN ASSISTANT

## 2025-06-18 PROCEDURE — 99285 EMERGENCY DEPT VISIT HI MDM: CPT

## 2025-06-18 PROCEDURE — 25510000001 IOPAMIDOL 61 % SOLUTION: Performed by: STUDENT IN AN ORGANIZED HEALTH CARE EDUCATION/TRAINING PROGRAM

## 2025-06-18 PROCEDURE — 83605 ASSAY OF LACTIC ACID: CPT | Performed by: PHYSICIAN ASSISTANT

## 2025-06-18 PROCEDURE — 25010000002 ONDANSETRON PER 1 MG: Performed by: PHYSICIAN ASSISTANT

## 2025-06-18 PROCEDURE — 80307 DRUG TEST PRSMV CHEM ANLYZR: CPT | Performed by: PHYSICIAN ASSISTANT

## 2025-06-18 PROCEDURE — 85025 COMPLETE CBC W/AUTO DIFF WBC: CPT | Performed by: PHYSICIAN ASSISTANT

## 2025-06-18 PROCEDURE — 76705 ECHO EXAM OF ABDOMEN: CPT

## 2025-06-18 PROCEDURE — 36415 COLL VENOUS BLD VENIPUNCTURE: CPT

## 2025-06-18 PROCEDURE — 76705 ECHO EXAM OF ABDOMEN: CPT | Performed by: RADIOLOGY

## 2025-06-18 PROCEDURE — 85652 RBC SED RATE AUTOMATED: CPT | Performed by: PHYSICIAN ASSISTANT

## 2025-06-18 PROCEDURE — 83690 ASSAY OF LIPASE: CPT | Performed by: PHYSICIAN ASSISTANT

## 2025-06-18 PROCEDURE — 81001 URINALYSIS AUTO W/SCOPE: CPT | Performed by: PHYSICIAN ASSISTANT

## 2025-06-18 PROCEDURE — 74177 CT ABD & PELVIS W/CONTRAST: CPT

## 2025-06-18 PROCEDURE — 96375 TX/PRO/DX INJ NEW DRUG ADDON: CPT

## 2025-06-18 PROCEDURE — 80053 COMPREHEN METABOLIC PANEL: CPT | Performed by: PHYSICIAN ASSISTANT

## 2025-06-18 PROCEDURE — 74177 CT ABD & PELVIS W/CONTRAST: CPT | Performed by: RADIOLOGY

## 2025-06-18 PROCEDURE — 84145 PROCALCITONIN (PCT): CPT | Performed by: PHYSICIAN ASSISTANT

## 2025-06-18 PROCEDURE — 87086 URINE CULTURE/COLONY COUNT: CPT | Performed by: PHYSICIAN ASSISTANT

## 2025-06-18 PROCEDURE — 86140 C-REACTIVE PROTEIN: CPT | Performed by: PHYSICIAN ASSISTANT

## 2025-06-18 PROCEDURE — 25010000002 KETOROLAC TROMETHAMINE PER 15 MG: Performed by: PHYSICIAN ASSISTANT

## 2025-06-18 PROCEDURE — 87040 BLOOD CULTURE FOR BACTERIA: CPT | Performed by: PHYSICIAN ASSISTANT

## 2025-06-18 PROCEDURE — 25010000002 PIPERACILLIN SOD-TAZOBACTAM PER 1 G: Performed by: PHYSICIAN ASSISTANT

## 2025-06-18 PROCEDURE — 83735 ASSAY OF MAGNESIUM: CPT | Performed by: PHYSICIAN ASSISTANT

## 2025-06-18 PROCEDURE — 84132 ASSAY OF SERUM POTASSIUM: CPT | Performed by: PHYSICIAN ASSISTANT

## 2025-06-18 PROCEDURE — 84703 CHORIONIC GONADOTROPIN ASSAY: CPT | Performed by: PHYSICIAN ASSISTANT

## 2025-06-18 PROCEDURE — 25810000003 SEPSIS FLUID NS 0.9 % SOLUTION: Performed by: PHYSICIAN ASSISTANT

## 2025-06-18 PROCEDURE — 96365 THER/PROPH/DIAG IV INF INIT: CPT

## 2025-06-18 RX ORDER — ONDANSETRON 2 MG/ML
4 INJECTION INTRAMUSCULAR; INTRAVENOUS ONCE
Status: COMPLETED | OUTPATIENT
Start: 2025-06-18 | End: 2025-06-18

## 2025-06-18 RX ORDER — ONDANSETRON 4 MG/1
4 TABLET, ORALLY DISINTEGRATING ORAL EVERY 6 HOURS PRN
Qty: 10 TABLET | Refills: 0 | Status: SHIPPED | OUTPATIENT
Start: 2025-06-18 | End: 2025-06-22

## 2025-06-18 RX ORDER — LACTULOSE 10 G/15ML
20 SOLUTION ORAL DAILY PRN
Qty: 237 ML | Refills: 0 | Status: SHIPPED | OUTPATIENT
Start: 2025-06-18 | End: 2025-06-24

## 2025-06-18 RX ORDER — IOPAMIDOL 612 MG/ML
100 INJECTION, SOLUTION INTRAVASCULAR
Status: COMPLETED | OUTPATIENT
Start: 2025-06-18 | End: 2025-06-18

## 2025-06-18 RX ORDER — DOCUSATE SODIUM 100 MG/1
100 CAPSULE, LIQUID FILLED ORAL 2 TIMES DAILY PRN
Qty: 20 CAPSULE | Refills: 0 | Status: SHIPPED | OUTPATIENT
Start: 2025-06-18 | End: 2025-06-24

## 2025-06-18 RX ORDER — SODIUM CHLORIDE 0.9 % (FLUSH) 0.9 %
10 SYRINGE (ML) INJECTION AS NEEDED
Status: DISCONTINUED | OUTPATIENT
Start: 2025-06-18 | End: 2025-06-18 | Stop reason: HOSPADM

## 2025-06-18 RX ORDER — PROCHLORPERAZINE MALEATE 10 MG
10 TABLET ORAL EVERY 6 HOURS PRN
Qty: 12 TABLET | Refills: 0 | Status: SHIPPED | OUTPATIENT
Start: 2025-06-18

## 2025-06-18 RX ORDER — PROCHLORPERAZINE EDISYLATE 5 MG/ML
10 INJECTION INTRAMUSCULAR; INTRAVENOUS ONCE
Status: COMPLETED | OUTPATIENT
Start: 2025-06-18 | End: 2025-06-18

## 2025-06-18 RX ORDER — KETOROLAC TROMETHAMINE 30 MG/ML
30 INJECTION, SOLUTION INTRAMUSCULAR; INTRAVENOUS ONCE
Status: COMPLETED | OUTPATIENT
Start: 2025-06-18 | End: 2025-06-18

## 2025-06-18 RX ADMIN — PIPERACILLIN SODIUM AND TAZOBACTAM SODIUM 3.38 G: 3; .375 INJECTION, POWDER, LYOPHILIZED, FOR SOLUTION INTRAVENOUS at 10:35

## 2025-06-18 RX ADMIN — KETOROLAC TROMETHAMINE 30 MG: 30 INJECTION, SOLUTION INTRAMUSCULAR; INTRAVENOUS at 14:11

## 2025-06-18 RX ADMIN — ONDANSETRON 4 MG: 2 INJECTION INTRAMUSCULAR; INTRAVENOUS at 14:11

## 2025-06-18 RX ADMIN — SODIUM CHLORIDE 2040 ML: 9 INJECTION, SOLUTION INTRAVENOUS at 09:30

## 2025-06-18 RX ADMIN — IOPAMIDOL 100 ML: 612 INJECTION, SOLUTION INTRAVENOUS at 11:19

## 2025-06-18 RX ADMIN — PROCHLORPERAZINE EDISYLATE 10 MG: 5 INJECTION INTRAMUSCULAR; INTRAVENOUS at 09:30

## 2025-06-18 NOTE — ED NOTES
Attempted to call patient's  at [phone] as listed in the patient chart. No answer x2 attempts.

## 2025-06-18 NOTE — DISCHARGE INSTRUCTIONS
Follow-up with your family doctor as well as the general surgery clinic in the office at the next available appointment.  Clear liquids only for the next 12 to 24 hours then advance your diet as tolerated.  I have also sent medication for a bowel regimen to help you have more frequent bowel movements as you did look pretty constipated on your CT scan.  I sent a stool softener and a laxative.  There are 2 different kinds of nausea medications at the pharmacy.  You can alternate these as needed.  Return to the ED at any time if symptoms change or worsen.

## 2025-06-18 NOTE — ED PROVIDER NOTES
Subjective   History of Present Illness  29-year-old female who presents to the ED today for nausea and vomiting.  She states she has had profuse vomiting since 2 AM.  She states she has not been able to keep anything down.  She states she has generalized abdominal cramping and pain.  She states she has heaved so much that her ribs hurt.  She denies any fever.  She denies any diarrhea.  She denies any sick contacts.  She denies any recent travel.  She states she did eat at a hibachi place yesterday and that could possibly be the source of her symptoms.  She states she is typically healthy and does not take any daily medications.  She denies any past surgeries.    History provided by:  Patient  Vomiting  The primary symptoms include abdominal pain, nausea and vomiting. Primary symptoms do not include fever or diarrhea. The illness began today. The onset was sudden. The problem has not changed since onset.  The illness is also significant for anorexia. The illness does not include constipation.       Review of Systems   Constitutional:  Positive for appetite change. Negative for fever.   HENT: Negative.     Eyes: Negative.    Respiratory: Negative.     Cardiovascular: Negative.    Gastrointestinal:  Positive for abdominal pain, anorexia, nausea and vomiting. Negative for constipation and diarrhea.   Genitourinary: Negative.    Musculoskeletal: Negative.    Skin: Negative.    Neurological: Negative.    Psychiatric/Behavioral: Negative.     All other systems reviewed and are negative.      Past Medical History:   Diagnosis Date    ADHD (attention deficit hyperactivity disorder)     Anxiety     Depression     Kidney stones        Allergies   Allergen Reactions    Morphine Hives       Past Surgical History:   Procedure Laterality Date    URETEROSCOPY STENT INSERTION Right 11/6/2023    Procedure: URETEROSCOPY LASER LITHOTRIPSY WITH STENT INSERTION;  Surgeon: Farhad Allen MD;  Location: [facility];  Service:  Urology;  Laterality: Right;    WRIST SURGERY         Family History   Problem Relation Age of Onset    No Known Problems Mother     No Known Problems Father     Hypertension Maternal Grandmother     Hypertension Maternal Grandfather     Hypertension Paternal Grandmother     Diabetes Paternal Grandmother        Social History     Socioeconomic History    Marital status:    Tobacco Use    Smoking status: Former     Current packs/day: 0.00     Types: Cigarettes     Quit date: 2022     Years since quitting: 3.4    Smokeless tobacco: Never   Vaping Use    Vaping status: Former    Passive vaping exposure: Yes   Substance and Sexual Activity    Alcohol use: No    Drug use: No    Sexual activity: Yes     Partners: Male     Birth control/protection: None           Objective   Physical Exam  Vitals and nursing note reviewed.   Constitutional:       General: She is not in acute distress.     Appearance: She is ill-appearing. She is not diaphoretic.   HENT:      Head: Normocephalic and atraumatic.      Right Ear: External ear normal.      Left Ear: External ear normal.      Nose: Nose normal.   Eyes:      Conjunctiva/sclera: Conjunctivae normal.      Pupils: Pupils are equal, round, and reactive to light.   Cardiovascular:      Rate and Rhythm: Normal rate and regular rhythm.      Pulses: Normal pulses.      Heart sounds: Normal heart sounds.   Pulmonary:      Effort: Pulmonary effort is normal.      Breath sounds: Normal breath sounds.   Abdominal:      General: Bowel sounds are normal. There is no distension.      Palpations: Abdomen is soft.      Tenderness: There is abdominal tenderness (mild diffuse tenderness, no evidence of an acute surgical abdomen). There is no right CVA tenderness, left CVA tenderness, guarding or rebound.   Musculoskeletal:         General: Normal range of motion.      Cervical back: Normal range of motion and neck supple.   Skin:     General: Skin is warm and dry.      Capillary Refill:  Capillary refill takes less than 2 seconds.   Neurological:      General: No focal deficit present.      Mental Status: She is alert and oriented to person, place, and time.   Psychiatric:         Mood and Affect: Mood normal.         Procedures       Results for orders placed or performed during the hospital encounter of 06/18/25   Lactic Acid, Plasma    Collection Time: 06/18/25  9:14 AM    Specimen: Blood   Result Value Ref Range    Lactate 1.8 0.5 - 2.0 mmol/L   CBC Auto Differential    Collection Time: 06/18/25  9:14 AM    Specimen: Blood   Result Value Ref Range    WBC 18.97 (H) 3.40 - 10.80 10*3/mm3    RBC 5.03 3.77 - 5.28 10*6/mm3    Hemoglobin 15.2 12.0 - 15.9 g/dL    Hematocrit 45.9 34.0 - 46.6 %    MCV 91.3 79.0 - 97.0 fL    MCH 30.2 26.6 - 33.0 pg    MCHC 33.1 31.5 - 35.7 g/dL    RDW 12.3 12.3 - 15.4 %    RDW-SD 41.1 37.0 - 54.0 fl    MPV 10.9 6.0 - 12.0 fL    Platelets 387 140 - 450 10*3/mm3    Neutrophil % 84.9 (H) 42.7 - 76.0 %    Lymphocyte % 7.3 (L) 19.6 - 45.3 %    Monocyte % 5.1 5.0 - 12.0 %    Eosinophil % 1.9 0.3 - 6.2 %    Basophil % 0.4 0.0 - 1.5 %    Immature Grans % 0.4 0.0 - 0.5 %    Neutrophils, Absolute 16.11 (H) 1.70 - 7.00 10*3/mm3    Lymphocytes, Absolute 1.38 0.70 - 3.10 10*3/mm3    Monocytes, Absolute 0.97 (H) 0.10 - 0.90 10*3/mm3    Eosinophils, Absolute 0.36 0.00 - 0.40 10*3/mm3    Basophils, Absolute 0.08 0.00 - 0.20 10*3/mm3    Immature Grans, Absolute 0.07 (H) 0.00 - 0.05 10*3/mm3    nRBC 0.0 0.0 - 0.2 /100 WBC   Sedimentation Rate    Collection Time: 06/18/25  9:14 AM    Specimen: Blood   Result Value Ref Range    Sed Rate 16 0 - 20 mm/hr   Green Top (Gel)    Collection Time: 06/18/25  9:14 AM   Result Value Ref Range    Extra Tube Hold for add-ons.    Lavender Top    Collection Time: 06/18/25  9:14 AM   Result Value Ref Range    Extra Tube hold for add-on    Light Blue Top    Collection Time: 06/18/25  9:14 AM   Result Value Ref Range    Extra Tube Hold for add-ons.     Urinalysis With Microscopic If Indicated (No Culture) - Urine, Clean Catch    Collection Time: 06/18/25  9:15 AM    Specimen: Urine, Clean Catch   Result Value Ref Range    Color, UA Yellow Yellow, Straw    Appearance, UA Clear Clear    pH, UA 5.5 5.0 - 8.0    Specific Gravity, UA 1.024 1.005 - 1.030    Glucose, UA Negative Negative    Ketones, UA Trace (A) Negative    Bilirubin, UA Negative Negative    Blood, UA Small (1+) (A) Negative    Protein, UA 30 mg/dL (1+) (A) Negative    Leuk Esterase, UA Moderate (2+) (A) Negative    Nitrite, UA Negative Negative    Urobilinogen, UA 1.0 E.U./dL 0.2 - 1.0 E.U./dL   Urinalysis, Microscopic Only - Urine, Clean Catch    Collection Time: 06/18/25  9:15 AM    Specimen: Urine, Clean Catch   Result Value Ref Range    RBC, UA 11-20 (A) None Seen, 0-2 /HPF    WBC, UA 11-20 (A) None Seen, 0-2 /HPF    Bacteria, UA 2+ (A) None Seen /HPF    Squamous Epithelial Cells, UA 3-6 (A) None Seen, 0-2 /HPF    Hyaline Casts, UA 0-2 None Seen /LPF    Methodology Automated Microscopy    Urine Drug Screen - Urine, Clean Catch    Collection Time: 06/18/25  9:15 AM    Specimen: Urine, Clean Catch   Result Value Ref Range    THC, Screen, Urine Negative Negative    Phencyclidine (PCP), Urine Negative Negative    Cocaine Screen, Urine Negative Negative    Methamphetamine, Ur Negative Negative    Opiate Screen Negative Negative    Amphetamine Screen, Urine Negative Negative    Benzodiazepine Screen, Urine Negative Negative    Tricyclic Antidepressants Screen Negative Negative    Methadone Screen, Urine Negative Negative    Barbiturates Screen, Urine Negative Negative    Oxycodone Screen, Urine Negative Negative    Buprenorphine, Screen, Urine Negative Negative   Fentanyl, Urine - Urine, Clean Catch    Collection Time: 06/18/25  9:15 AM    Specimen: Urine, Clean Catch   Result Value Ref Range    Fentanyl, Urine Negative Negative   [place]an Urine Culture Tube - Urine, Clean Catch    Collection Time:  06/18/25  9:15 AM    Specimen: Urine, Clean Catch   Result Value Ref Range    Extra Tube Hold for add-ons.    hCG, Serum, Qualitative    Collection Time: 06/18/25  9:47 AM    Specimen: Blood   Result Value Ref Range    HCG Qualitative Negative Negative   Comprehensive Metabolic Panel    Collection Time: 06/18/25 10:05 AM    Specimen: Blood   Result Value Ref Range    Glucose 99 65 - 99 mg/dL    BUN 16.7 6.0 - 20.0 mg/dL    Creatinine 0.78 0.57 - 1.00 mg/dL    Sodium 136 136 - 145 mmol/L    Potassium 5.4 (H) 3.5 - 5.2 mmol/L    Chloride 105 98 - 107 mmol/L    CO2 16.5 (L) 22.0 - 29.0 mmol/L    Calcium 8.9 8.6 - 10.5 mg/dL    Total Protein 7.3 6.0 - 8.5 g/dL    Albumin 4.1 3.5 - 5.2 g/dL    ALT (SGPT) 27 1 - 33 U/L    AST (SGOT) 39 (H) 1 - 32 U/L    Alkaline Phosphatase 64 39 - 117 U/L    Total Bilirubin 0.4 0.0 - 1.2 mg/dL    Globulin 3.2 gm/dL    A/G Ratio 1.3 g/dL    BUN/Creatinine Ratio 21.4 7.0 - 25.0    Anion Gap 14.5 5.0 - 15.0 mmol/L    eGFR 104.9 >60.0 mL/min/1.73   Lipase    Collection Time: 06/18/25 10:05 AM    Specimen: Blood   Result Value Ref Range    Lipase 27 13 - 60 U/L   C-reactive Protein    Collection Time: 06/18/25 10:05 AM    Specimen: Blood   Result Value Ref Range    C-Reactive Protein <0.30 0.00 - 0.50 mg/dL   Magnesium    Collection Time: 06/18/25 10:05 AM    Specimen: Blood   Result Value Ref Range    Magnesium 2.3 1.6 - 2.6 mg/dL   Procalcitonin    Collection Time: 06/18/25 10:05 AM    Specimen: Blood   Result Value Ref Range    Procalcitonin 0.04 0.00 - 0.25 ng/mL   Potassium    Collection Time: 06/18/25 11:34 AM    Specimen: Blood   Result Value Ref Range    Potassium 4.6 3.5 - 5.2 mmol/L          ED Course  ED Course as of 06/18/25 1601   Wed Jun 18, 2025   0949 WBC(!): 18.97 [AH]   1253 CT Abdomen Pelvis With Contrast  IMPRESSION:     Scratch  1. Cholelithiasis  2. Right-sided hydronephrosis and hydroureter but no distal ureteral  stone. There is a large nonobstructing stone in the  right renal pelvis  3. Large volume stool and probable nonspecific colitis  4. Other findings as above   []   1353 US Abdomen Limited  FINDINGS:  Sonographic imaging of the abdomen     Liver is homogeneous. Hepatic flow is hepatopetal.     Gallbladder shows cholelithiasis. No pericholecystic fluid.     Right kidney shows no hydronephrosis.     IMPRESSION:  Cholelithiasis.   []   1414 Discussed with Dr. Hernandez []      ED Course User Index  [] Zorha Austin, PA                                                       Medical Decision Making  29-year-old female who presents to the ED today for vomiting and abdominal pain.  Her symptoms started at 2 AM.  Patient did have an elevated white blood cell count but her inflammatory markers were unremarkable.  She received IV fluids, antiemetics and a dose of IV Zosyn.  Patient was noted to have cholelithiasis and a large amount of stool on CT scan.  Ultrasound of the abdomen shows cholelithiasis but no gallbladder wall thickening or pericholecystic fluid.  There was no hydronephrosis on ultrasound however there was some hydronephrosis on CT scan but no evidence of a ureteral stone.  The patient is not having any urinary symptoms.  I did discuss the patient with Dr. Hernandez.  He recommended a bowel regimen and antiemetics for home.  She can follow-up with him in the office next week.  I discussed all of this with the patient and she is agreeable.  She will return to the ED if symptoms change or worsen.    Problems Addressed:  Calculus of gallbladder without cholecystitis without obstruction: complicated acute illness or injury  Constipation, unspecified constipation type: complicated acute illness or injury  Nausea and vomiting, unspecified vomiting type: complicated acute illness or injury    Amount and/or Complexity of Data Reviewed  Labs: ordered. Decision-making details documented in ED Course.  Radiology: ordered. Decision-making details documented in ED  Course.    Risk  OTC drugs.  Prescription drug management.        Final diagnoses:   Nausea and vomiting, unspecified vomiting type   Constipation, unspecified constipation type   Calculus of gallbladder without cholecystitis without obstruction       ED Disposition  ED Disposition       ED Disposition   Discharge    Condition   Stable    Comment   --               Yovana Robles, APRN  [address]  [place] [address]  [phone]    Schedule an appointment as soon as possible for a visit in 2 days      Adrian Hernandez MD  [address] [place] [address]  [phone]    Schedule an appointment as soon as possible for a visit in 1 week           Medication List        New Prescriptions      docusate sodium 100 MG capsule  Commonly known as: COLACE  Take 1 capsule by mouth 2 (Two) Times a Day As Needed for Constipation.     lactulose 10 GM/15ML solution  Commonly known as: CHRONULAC  Take 30 mL by mouth Daily As Needed (constipation).     ondansetron ODT 4 MG disintegrating tablet  Commonly known as: ZOFRAN-ODT  Place 1 tablet on the tongue Every 6 (Six) Hours As Needed for Nausea or Vomiting.     prochlorperazine 10 MG tablet  Commonly known as: COMPAZINE  Take 1 tablet by mouth Every 6 (Six) Hours As Needed for Nausea or Vomiting.            Stop      cephalexin 500 MG capsule  Commonly known as: KEFLEX     ibuprofen 800 MG tablet  Commonly known as: ADVIL,MOTRIN     ketorolac 10 MG tablet  Commonly known as: TORADOL     NIFEdipine XL 30 MG 24 hr tablet  Commonly known as: PROCARDIA XL     prenatal (CLASSIC) vitamin 28-0.8 MG tablet tablet  Generic drug: prenatal vitamin     TiZANidine 4 MG capsule  Commonly known as: Zanaflex               Where to Get Your Medications        These medications were sent to [facility] - [place], [place] - [address] - [phone] [facility] [phone] FX  [address], [place] [address]      Phone: [phone]   docusate sodium 100 MG  capsule  lactulose 10 GM/15ML solution  ondansetron ODT 4 MG disintegrating tablet  prochlorperazine 10 MG tablet            Zohra Austin, PA  06/18/25 6507

## 2025-06-19 LAB — BACTERIA SPEC AEROBE CULT: NO GROWTH

## 2025-06-20 ENCOUNTER — OFFICE VISIT (OUTPATIENT)
Dept: SURGERY | Facility: CLINIC | Age: 30
End: 2025-06-20
Payer: COMMERCIAL

## 2025-06-20 VITALS
HEIGHT: 62 IN | WEIGHT: 161 LBS | DIASTOLIC BLOOD PRESSURE: 70 MMHG | SYSTOLIC BLOOD PRESSURE: 110 MMHG | BODY MASS INDEX: 29.63 KG/M2

## 2025-06-20 DIAGNOSIS — K80.20 SYMPTOMATIC CHOLELITHIASIS: Primary | ICD-10-CM

## 2025-06-20 RX ORDER — SODIUM CHLORIDE 0.9 % (FLUSH) 0.9 %
3 SYRINGE (ML) INJECTION EVERY 12 HOURS SCHEDULED
OUTPATIENT
Start: 2025-06-20

## 2025-06-20 RX ORDER — SODIUM CHLORIDE 9 MG/ML
40 INJECTION, SOLUTION INTRAVENOUS AS NEEDED
OUTPATIENT
Start: 2025-06-20

## 2025-06-20 RX ORDER — ACETAMINOPHEN 500 MG
1000 TABLET ORAL EVERY 6 HOURS PRN
Qty: 100 TABLET | Refills: 0 | Status: SHIPPED | OUTPATIENT
Start: 2025-06-20 | End: 2026-06-20

## 2025-06-20 RX ORDER — ONDANSETRON 4 MG/1
4 TABLET, FILM COATED ORAL EVERY 8 HOURS PRN
Qty: 30 TABLET | Refills: 0 | Status: SHIPPED | OUTPATIENT
Start: 2025-06-20 | End: 2025-06-22

## 2025-06-20 RX ORDER — IBUPROFEN 800 MG/1
800 TABLET, FILM COATED ORAL EVERY 8 HOURS PRN
Qty: 60 TABLET | Refills: 0 | Status: SHIPPED | OUTPATIENT
Start: 2025-06-20 | End: 2026-06-20

## 2025-06-20 RX ORDER — SODIUM CHLORIDE 0.9 % (FLUSH) 0.9 %
10 SYRINGE (ML) INJECTION AS NEEDED
OUTPATIENT
Start: 2025-06-20

## 2025-06-20 RX ORDER — INDOCYANINE GREEN AND WATER 25 MG
2.5 KIT INJECTION ONCE
OUTPATIENT
Start: 2025-06-20 | End: 2025-06-20

## 2025-06-20 NOTE — H&P (VIEW-ONLY)
Subjective   Nicki Schafer is a 29 y.o. female who presents today for Initial Evaluation    Chief Complaint:    Chief Complaint   Patient presents with    Cholelithiasis        History of Present Illness:    History of Present Illness Nicki is a 29-year-old female presents for evaluation for cholelithiasis.  She reports that she has had nausea as well as vomiting and cannot keep anything down.  States that she even has a hard time tolerating drinking water.  She was evaluated the emergency department on 6/18.  Diagnosed with gallstones.  She states that she does not relate any specific dietary choices to her symptoms.  Complains of having pain to her right upper quadrant as well as radiating to her ribs and lower back.    The following portions of the patient's history were reviewed and updated as appropriate: allergies, current medications, past family history, past medical history, past social history, past surgical history and problem list.    Past Medical History:  Past Medical History:   Diagnosis Date    ADHD (attention deficit hyperactivity disorder)     Anxiety     Depression     Kidney stones        Social History:  Social History     Socioeconomic History    Marital status:    Tobacco Use    Smoking status: Former     Current packs/day: 0.00     Types: Cigarettes     Quit date: 2022     Years since quitting: 3.4     Passive exposure: Past    Smokeless tobacco: Never   Vaping Use    Vaping status: Former    Passive vaping exposure: Yes   Substance and Sexual Activity    Alcohol use: No    Drug use: No    Sexual activity: Yes     Partners: Male     Birth control/protection: None       Family History:  Family History   Problem Relation Age of Onset    No Known Problems Mother     No Known Problems Father     Hypertension Maternal Grandmother     Hypertension Maternal Grandfather     Hypertension Paternal Grandmother     Diabetes Paternal Grandmother        Past Surgical History:  Past Surgical History:    Procedure Laterality Date    URETEROSCOPY STENT INSERTION Right 11/6/2023    Procedure: URETEROSCOPY LASER LITHOTRIPSY WITH STENT INSERTION;  Surgeon: Farhad Allen MD;  Location: [facility];  Service: Urology;  Laterality: Right;    WRIST SURGERY         Problem List:  Patient Active Problem List   Diagnosis    Right ureteral stone    Pregnant    Vaginal spotting    Postpartum care following vaginal delivery    Symptomatic cholelithiasis       Allergy:   Allergies   Allergen Reactions    Morphine Hives        Current Medications:   Current Outpatient Medications   Medication Sig Dispense Refill    lactulose (CHRONULAC) 10 GM/15ML solution Take 30 mL by mouth Daily As Needed (constipation). 237 mL 0    ondansetron ODT (ZOFRAN-ODT) 4 MG disintegrating tablet Place 1 tablet on the tongue Every 6 (Six) Hours As Needed for Nausea or Vomiting. 10 tablet 0    prochlorperazine (COMPAZINE) 10 MG tablet Take 1 tablet by mouth Every 6 (Six) Hours As Needed for Nausea or Vomiting. 12 tablet 0    acetaminophen (TYLENOL) 500 MG tablet Take 2 tablets by mouth Every 6 (Six) Hours As Needed for Moderate Pain. 100 tablet 0    docusate sodium (COLACE) 100 MG capsule Take 1 capsule by mouth 2 (Two) Times a Day As Needed for Constipation. 20 capsule 0    ibuprofen (ADVIL,MOTRIN) 800 MG tablet Take 1 tablet by mouth Every 8 (Eight) Hours As Needed for Moderate Pain. 60 tablet 0    ondansetron (Zofran) 4 MG tablet Take 1 tablet by mouth Every 8 (Eight) Hours As Needed for Nausea or Vomiting. 30 tablet 0     No current facility-administered medications for this visit.       Review of Systems:    Review of Systems   Gastrointestinal:  Positive for abdominal pain, constipation, nausea and vomiting.   Musculoskeletal:  Positive for back pain.         Physical Exam:   Physical Exam  Constitutional:       Appearance: Normal appearance.   HENT:      Head: Normocephalic and atraumatic.      Right Ear: External ear normal.      Left  "Ear: External ear normal.   Eyes:      Conjunctiva/sclera: Conjunctivae normal.   Cardiovascular:      Pulses: Normal pulses.   Pulmonary:      Effort: Pulmonary effort is normal.   Abdominal:      General: Abdomen is flat.   Musculoskeletal:         General: Normal range of motion.      Cervical back: Normal range of motion.   Skin:     General: Skin is warm and dry.      Capillary Refill: Capillary refill takes less than 2 seconds.   Neurological:      General: No focal deficit present.      Mental Status: She is alert and oriented to person, place, and time.   Psychiatric:         Mood and Affect: Mood normal.         Behavior: Behavior normal.         Vitals:  Blood pressure 110/70, height 157.5 cm (62.01\"), weight 73 kg (161 lb), last menstrual period 05/23/2025, not currently breastfeeding.   Body mass index is 29.44 kg/m².     Imaging:   US Abdomen Limited  Narrative: EXAMINATION: US ABDOMEN LIMITED-      CLINICAL INDICATION: cholelithiasis        COMPARISON: None available     FINDINGS:  Sonographic imaging of the abdomen     Liver is homogeneous. Hepatic flow is hepatopetal.     Gallbladder shows cholelithiasis. No pericholecystic fluid.     Right kidney shows no hydronephrosis.     Impression: Cholelithiasis.        This report was finalized on 6/18/2025 1:43 PM by Dr. Gucic Siu MD.     CT Abdomen Pelvis With Contrast  Narrative: EXAM: CT ABDOMEN PELVIS W CONTRAST-         TECHNIQUE: Multiple axial CT images were obtained from lung bases  through pubic symphysis WITH administration of IV contrast. Reformatted  images in the coronal and/or sagittal plane(s) were generated from the  axial data set to facilitate diagnostic accuracy and/or surgical  planning.  Oral Contrast:NONE.     Radiation dose reduction techniques were utilized per ALARA protocol.  Automated exposure control was initiated through either or CareDose or  DoseRigTucker Auto-Mation software packages by  protocol.    DOSE:     Clinical " information abd pain, vomiting      Comparison 11/10/2023     FINDINGS:     Lower thorax: Clear. No effusions.     Abdomen:     Liver: Homogeneous. No focal hepatic mass or ductal dilatation.     Gallbladder: Cholelithiasis     Pancreas: Unremarkable. No mass or ductal dilatation.     Spleen: Homogeneous. No splenomegaly.     Adrenals: No mass.     Kidneys/ureters: Nonobstructing stone in the right kidney but there is  hydronephrosis and hydroureter. No definitive distal ureteral stone is  able to be localized.     GI tract: Large stool burden. Appearance suggestive of a nonspecific  colitis     MESENTERY: No free fluid, walled off fluid collections, mesenteric  stranding, or enlarged lymph nodes        Vasculature: No evidence of aneurysm.     Abdominal wall: No focal hernia or mass.        Bladder: No focal mass or significant wall thickening     Reproductive: Unremarkable as visualized     Bones: No acute bony abnormality.     Impression:    Scratch  1. Cholelithiasis  2. Right-sided hydronephrosis and hydroureter but no distal ureteral  stone. There is a large nonobstructing stone in the right renal pelvis  3. Large volume stool and probable nonspecific colitis  4. Other findings as above                                   This report was finalized on 6/18/2025 12:51 PM by Dr. Gucci Siu MD.          Assessment & Plan   Diagnoses and all orders for this visit:    1. Symptomatic cholelithiasis (Primary)  -     Case Request; Standing  -     sodium chloride 0.9 % flush 3 mL  -     sodium chloride 0.9 % flush 10 mL  -     sodium chloride 0.9 % infusion 40 mL  -     ceFAZolin 2000 mg IVPB in 100 mL NS (VTB)  -     indocyanine green (IC-GREEN) injection 2.5 mg  -     Case Request    Other orders  -     Follow Anesthesia Guidelines / Protocol; Future  -     Follow Anesthesia Guidelines / Protocol; Standing  -     Verify / Perform Chlorhexidine Skin Prep; Standing  -     Provide NPO Instructions to Patient; Future  -      Chlorhexidine Skin Prep; Future  -     Insert Peripheral IV; Standing  -     Saline Lock & Maintain IV Access; Standing  -     Place Sequential Compression Device; Standing  -     Maintain Sequential Compression Device; Standing  -     ondansetron (Zofran) 4 MG tablet; Take 1 tablet by mouth Every 8 (Eight) Hours As Needed for Nausea or Vomiting.  Dispense: 30 tablet; Refill: 0  -     ibuprofen (ADVIL,MOTRIN) 800 MG tablet; Take 1 tablet by mouth Every 8 (Eight) Hours As Needed for Moderate Pain.  Dispense: 60 tablet; Refill: 0  -     acetaminophen (TYLENOL) 500 MG tablet; Take 2 tablets by mouth Every 6 (Six) Hours As Needed for Moderate Pain.  Dispense: 100 tablet; Refill: 0    Nicki is a 29-year-old female who presents for evaluation for symptomatic cholelithiasis.  To undergo robotic cholecystectomy with Dr. Hernandez next Thursday.  Verbalized understanding of prep instructions and procedure and wishes to proceed.    Visit Diagnoses:    ICD-10-CM ICD-9-CM   1. Symptomatic cholelithiasis  K80.20 574.20         MEDS ORDERED DURING VISIT:  New Medications Ordered This Visit   Medications    ondansetron (Zofran) 4 MG tablet     Sig: Take 1 tablet by mouth Every 8 (Eight) Hours As Needed for Nausea or Vomiting.     Dispense:  30 tablet     Refill:  0    ibuprofen (ADVIL,MOTRIN) 800 MG tablet     Sig: Take 1 tablet by mouth Every 8 (Eight) Hours As Needed for Moderate Pain.     Dispense:  60 tablet     Refill:  0    acetaminophen (TYLENOL) 500 MG tablet     Sig: Take 2 tablets by mouth Every 6 (Six) Hours As Needed for Moderate Pain.     Dispense:  100 tablet     Refill:  0       Return for Follow-up postop.             This document has been electronically signed by WARD Riojas  June 20, 2025 13:32 EDT    Please note that portions of this note were completed with a voice recognition program.

## 2025-06-20 NOTE — PROGRESS NOTES
Subjective   Nicki Schafer is a 29 y.o. female who presents today for Initial Evaluation    Chief Complaint:    Chief Complaint   Patient presents with    Cholelithiasis        History of Present Illness:    History of Present Illness Nicki is a 29-year-old female presents for evaluation for cholelithiasis.  She reports that she has had nausea as well as vomiting and cannot keep anything down.  States that she even has a hard time tolerating drinking water.  She was evaluated the emergency department on 6/18.  Diagnosed with gallstones.  She states that she does not relate any specific dietary choices to her symptoms.  Complains of having pain to her right upper quadrant as well as radiating to her ribs and lower back.    The following portions of the patient's history were reviewed and updated as appropriate: allergies, current medications, past family history, past medical history, past social history, past surgical history and problem list.    Past Medical History:  Past Medical History:   Diagnosis Date    ADHD (attention deficit hyperactivity disorder)     Anxiety     Depression     Kidney stones        Social History:  Social History     Socioeconomic History    Marital status:    Tobacco Use    Smoking status: Former     Current packs/day: 0.00     Types: Cigarettes     Quit date: 2022     Years since quitting: 3.4     Passive exposure: Past    Smokeless tobacco: Never   Vaping Use    Vaping status: Former    Passive vaping exposure: Yes   Substance and Sexual Activity    Alcohol use: No    Drug use: No    Sexual activity: Yes     Partners: Male     Birth control/protection: None       Family History:  Family History   Problem Relation Age of Onset    No Known Problems Mother     No Known Problems Father     Hypertension Maternal Grandmother     Hypertension Maternal Grandfather     Hypertension Paternal Grandmother     Diabetes Paternal Grandmother        Past Surgical History:  Past Surgical History:    Procedure Laterality Date    URETEROSCOPY STENT INSERTION Right 11/6/2023    Procedure: URETEROSCOPY LASER LITHOTRIPSY WITH STENT INSERTION;  Surgeon: Farhad Allen MD;  Location: [facility];  Service: Urology;  Laterality: Right;    WRIST SURGERY         Problem List:  Patient Active Problem List   Diagnosis    Right ureteral stone    Pregnant    Vaginal spotting    Postpartum care following vaginal delivery    Symptomatic cholelithiasis       Allergy:   Allergies   Allergen Reactions    Morphine Hives        Current Medications:   Current Outpatient Medications   Medication Sig Dispense Refill    lactulose (CHRONULAC) 10 GM/15ML solution Take 30 mL by mouth Daily As Needed (constipation). 237 mL 0    ondansetron ODT (ZOFRAN-ODT) 4 MG disintegrating tablet Place 1 tablet on the tongue Every 6 (Six) Hours As Needed for Nausea or Vomiting. 10 tablet 0    prochlorperazine (COMPAZINE) 10 MG tablet Take 1 tablet by mouth Every 6 (Six) Hours As Needed for Nausea or Vomiting. 12 tablet 0    acetaminophen (TYLENOL) 500 MG tablet Take 2 tablets by mouth Every 6 (Six) Hours As Needed for Moderate Pain. 100 tablet 0    docusate sodium (COLACE) 100 MG capsule Take 1 capsule by mouth 2 (Two) Times a Day As Needed for Constipation. 20 capsule 0    ibuprofen (ADVIL,MOTRIN) 800 MG tablet Take 1 tablet by mouth Every 8 (Eight) Hours As Needed for Moderate Pain. 60 tablet 0    ondansetron (Zofran) 4 MG tablet Take 1 tablet by mouth Every 8 (Eight) Hours As Needed for Nausea or Vomiting. 30 tablet 0     No current facility-administered medications for this visit.       Review of Systems:    Review of Systems   Gastrointestinal:  Positive for abdominal pain, constipation, nausea and vomiting.   Musculoskeletal:  Positive for back pain.         Physical Exam:   Physical Exam  Constitutional:       Appearance: Normal appearance.   HENT:      Head: Normocephalic and atraumatic.      Right Ear: External ear normal.      Left  "Ear: External ear normal.   Eyes:      Conjunctiva/sclera: Conjunctivae normal.   Cardiovascular:      Pulses: Normal pulses.   Pulmonary:      Effort: Pulmonary effort is normal.   Abdominal:      General: Abdomen is flat.   Musculoskeletal:         General: Normal range of motion.      Cervical back: Normal range of motion.   Skin:     General: Skin is warm and dry.      Capillary Refill: Capillary refill takes less than 2 seconds.   Neurological:      General: No focal deficit present.      Mental Status: She is alert and oriented to person, place, and time.   Psychiatric:         Mood and Affect: Mood normal.         Behavior: Behavior normal.         Vitals:  Blood pressure 110/70, height 157.5 cm (62.01\"), weight 73 kg (161 lb), last menstrual period 05/23/2025, not currently breastfeeding.   Body mass index is 29.44 kg/m².     Imaging:   US Abdomen Limited  Narrative: EXAMINATION: US ABDOMEN LIMITED-      CLINICAL INDICATION: cholelithiasis        COMPARISON: None available     FINDINGS:  Sonographic imaging of the abdomen     Liver is homogeneous. Hepatic flow is hepatopetal.     Gallbladder shows cholelithiasis. No pericholecystic fluid.     Right kidney shows no hydronephrosis.     Impression: Cholelithiasis.        This report was finalized on 6/18/2025 1:43 PM by Dr. Gucci Siu MD.     CT Abdomen Pelvis With Contrast  Narrative: EXAM: CT ABDOMEN PELVIS W CONTRAST-         TECHNIQUE: Multiple axial CT images were obtained from lung bases  through pubic symphysis WITH administration of IV contrast. Reformatted  images in the coronal and/or sagittal plane(s) were generated from the  axial data set to facilitate diagnostic accuracy and/or surgical  planning.  Oral Contrast:NONE.     Radiation dose reduction techniques were utilized per ALARA protocol.  Automated exposure control was initiated through either or CareDose or  DoseRigCompBlue software packages by  protocol.    DOSE:     Clinical " information abd pain, vomiting      Comparison 11/10/2023     FINDINGS:     Lower thorax: Clear. No effusions.     Abdomen:     Liver: Homogeneous. No focal hepatic mass or ductal dilatation.     Gallbladder: Cholelithiasis     Pancreas: Unremarkable. No mass or ductal dilatation.     Spleen: Homogeneous. No splenomegaly.     Adrenals: No mass.     Kidneys/ureters: Nonobstructing stone in the right kidney but there is  hydronephrosis and hydroureter. No definitive distal ureteral stone is  able to be localized.     GI tract: Large stool burden. Appearance suggestive of a nonspecific  colitis     MESENTERY: No free fluid, walled off fluid collections, mesenteric  stranding, or enlarged lymph nodes        Vasculature: No evidence of aneurysm.     Abdominal wall: No focal hernia or mass.        Bladder: No focal mass or significant wall thickening     Reproductive: Unremarkable as visualized     Bones: No acute bony abnormality.     Impression:    Scratch  1. Cholelithiasis  2. Right-sided hydronephrosis and hydroureter but no distal ureteral  stone. There is a large nonobstructing stone in the right renal pelvis  3. Large volume stool and probable nonspecific colitis  4. Other findings as above                                   This report was finalized on 6/18/2025 12:51 PM by Dr. Gucci Siu MD.          Assessment & Plan   Diagnoses and all orders for this visit:    1. Symptomatic cholelithiasis (Primary)  -     Case Request; Standing  -     sodium chloride 0.9 % flush 3 mL  -     sodium chloride 0.9 % flush 10 mL  -     sodium chloride 0.9 % infusion 40 mL  -     ceFAZolin 2000 mg IVPB in 100 mL NS (VTB)  -     indocyanine green (IC-GREEN) injection 2.5 mg  -     Case Request    Other orders  -     Follow Anesthesia Guidelines / Protocol; Future  -     Follow Anesthesia Guidelines / Protocol; Standing  -     Verify / Perform Chlorhexidine Skin Prep; Standing  -     Provide NPO Instructions to Patient; Future  -      Chlorhexidine Skin Prep; Future  -     Insert Peripheral IV; Standing  -     Saline Lock & Maintain IV Access; Standing  -     Place Sequential Compression Device; Standing  -     Maintain Sequential Compression Device; Standing  -     ondansetron (Zofran) 4 MG tablet; Take 1 tablet by mouth Every 8 (Eight) Hours As Needed for Nausea or Vomiting.  Dispense: 30 tablet; Refill: 0  -     ibuprofen (ADVIL,MOTRIN) 800 MG tablet; Take 1 tablet by mouth Every 8 (Eight) Hours As Needed for Moderate Pain.  Dispense: 60 tablet; Refill: 0  -     acetaminophen (TYLENOL) 500 MG tablet; Take 2 tablets by mouth Every 6 (Six) Hours As Needed for Moderate Pain.  Dispense: 100 tablet; Refill: 0    Nicki is a 29-year-old female who presents for evaluation for symptomatic cholelithiasis.  To undergo robotic cholecystectomy with Dr. Hernandez next Thursday.  Verbalized understanding of prep instructions and procedure and wishes to proceed.    Visit Diagnoses:    ICD-10-CM ICD-9-CM   1. Symptomatic cholelithiasis  K80.20 574.20         MEDS ORDERED DURING VISIT:  New Medications Ordered This Visit   Medications    ondansetron (Zofran) 4 MG tablet     Sig: Take 1 tablet by mouth Every 8 (Eight) Hours As Needed for Nausea or Vomiting.     Dispense:  30 tablet     Refill:  0    ibuprofen (ADVIL,MOTRIN) 800 MG tablet     Sig: Take 1 tablet by mouth Every 8 (Eight) Hours As Needed for Moderate Pain.     Dispense:  60 tablet     Refill:  0    acetaminophen (TYLENOL) 500 MG tablet     Sig: Take 2 tablets by mouth Every 6 (Six) Hours As Needed for Moderate Pain.     Dispense:  100 tablet     Refill:  0       Return for Follow-up postop.             This document has been electronically signed by WARD Riojas  June 20, 2025 13:32 EDT    Please note that portions of this note were completed with a voice recognition program.

## 2025-06-22 ENCOUNTER — HOSPITAL ENCOUNTER (EMERGENCY)
Facility: HOSPITAL | Age: 30
Discharge: HOME | End: 2025-06-22
Payer: COMMERCIAL

## 2025-06-22 VITALS
RESPIRATION RATE: 18 BRPM | HEART RATE: 78 BPM | BODY MASS INDEX: 29.44 KG/M2 | OXYGEN SATURATION: 100 % | TEMPERATURE: 98 F | SYSTOLIC BLOOD PRESSURE: 135 MMHG | DIASTOLIC BLOOD PRESSURE: 78 MMHG | WEIGHT: 160 LBS | HEIGHT: 62 IN

## 2025-06-22 DIAGNOSIS — K80.50 CALCULUS OF BILE DUCT WITHOUT CHOLANGITIS OR BILIARY OBSTRUCTION: Primary | ICD-10-CM

## 2025-06-22 LAB
ALBUMIN SERPL-MCNC: 4 G/DL (ref 3.5–5.2)
ALBUMIN/GLOB SERPL: 1.3 G/DL
ALP SERPL-CCNC: 50 U/L (ref 39–117)
ALT SERPL W P-5'-P-CCNC: 10 U/L (ref 1–33)
ANION GAP SERPL CALCULATED.3IONS-SCNC: 12.7 MMOL/L (ref 5–15)
AST SERPL-CCNC: 15 U/L (ref 1–32)
B-HCG UR QL: NEGATIVE
BACTERIA UR QL AUTO: ABNORMAL /HPF
BASOPHILS # BLD AUTO: 0.1 10*3/MM3 (ref 0–0.2)
BASOPHILS NFR BLD AUTO: 0.9 % (ref 0–1.5)
BILIRUB SERPL-MCNC: 0.4 MG/DL (ref 0–1.2)
BILIRUB UR QL STRIP: NEGATIVE
BUN SERPL-MCNC: 19.4 MG/DL (ref 6–20)
BUN/CREAT SERPL: 22.8 (ref 7–25)
CALCIUM SPEC-SCNC: 8.6 MG/DL (ref 8.6–10.5)
CHLORIDE SERPL-SCNC: 103 MMOL/L (ref 98–107)
CLARITY UR: ABNORMAL
CO2 SERPL-SCNC: 22.3 MMOL/L (ref 22–29)
COLOR UR: YELLOW
CREAT SERPL-MCNC: 0.85 MG/DL (ref 0.57–1)
DEPRECATED RDW RBC AUTO: 40.2 FL (ref 37–54)
EGFRCR SERPLBLD CKD-EPI 2021: 94.7 ML/MIN/1.73
EOSINOPHIL # BLD AUTO: 0.37 10*3/MM3 (ref 0–0.4)
EOSINOPHIL NFR BLD AUTO: 3.3 % (ref 0.3–6.2)
ERYTHROCYTE [DISTWIDTH] IN BLOOD BY AUTOMATED COUNT: 12.3 % (ref 12.3–15.4)
GEN 5 1HR TROPONIN T REFLEX: <6 NG/L
GLOBULIN UR ELPH-MCNC: 3 GM/DL
GLUCOSE SERPL-MCNC: 122 MG/DL (ref 65–99)
GLUCOSE UR STRIP-MCNC: NEGATIVE MG/DL
HCT VFR BLD AUTO: 43.6 % (ref 34–46.6)
HGB BLD-MCNC: 14.4 G/DL (ref 12–15.9)
HGB UR QL STRIP.AUTO: ABNORMAL
HOLD SPECIMEN: NORMAL
HOLD SPECIMEN: NORMAL
HYALINE CASTS UR QL AUTO: ABNORMAL /LPF
IMM GRANULOCYTES # BLD AUTO: 0.04 10*3/MM3 (ref 0–0.05)
IMM GRANULOCYTES NFR BLD AUTO: 0.4 % (ref 0–0.5)
KETONES UR QL STRIP: ABNORMAL
LEUKOCYTE ESTERASE UR QL STRIP.AUTO: ABNORMAL
LIPASE SERPL-CCNC: 36 U/L (ref 13–60)
LYMPHOCYTES # BLD AUTO: 2.96 10*3/MM3 (ref 0.7–3.1)
LYMPHOCYTES NFR BLD AUTO: 26.4 % (ref 19.6–45.3)
MCH RBC QN AUTO: 29.8 PG (ref 26.6–33)
MCHC RBC AUTO-ENTMCNC: 33 G/DL (ref 31.5–35.7)
MCV RBC AUTO: 90.1 FL (ref 79–97)
MONOCYTES # BLD AUTO: 0.79 10*3/MM3 (ref 0.1–0.9)
MONOCYTES NFR BLD AUTO: 7 % (ref 5–12)
NEUTROPHILS NFR BLD AUTO: 6.97 10*3/MM3 (ref 1.7–7)
NEUTROPHILS NFR BLD AUTO: 62 % (ref 42.7–76)
NITRITE UR QL STRIP: NEGATIVE
NRBC BLD AUTO-RTO: 0 /100 WBC (ref 0–0.2)
PH UR STRIP.AUTO: 5.5 [PH] (ref 5–8)
PLATELET # BLD AUTO: 384 10*3/MM3 (ref 140–450)
PMV BLD AUTO: 10.8 FL (ref 6–12)
POTASSIUM SERPL-SCNC: 3.6 MMOL/L (ref 3.5–5.2)
PROT SERPL-MCNC: 7 G/DL (ref 6–8.5)
PROT UR QL STRIP: ABNORMAL
RBC # BLD AUTO: 4.84 10*6/MM3 (ref 3.77–5.28)
RBC # UR STRIP: ABNORMAL /HPF
REF LAB TEST METHOD: ABNORMAL
SODIUM SERPL-SCNC: 138 MMOL/L (ref 136–145)
SP GR UR STRIP: 1.02 (ref 1–1.03)
SQUAMOUS #/AREA URNS HPF: ABNORMAL /HPF
TROPONIN T NUMERIC DELTA: NORMAL
TROPONIN T SERPL HS-MCNC: <6 NG/L
UROBILINOGEN UR QL STRIP: ABNORMAL
WBC # UR STRIP: ABNORMAL /HPF
WBC NRBC COR # BLD AUTO: 11.23 10*3/MM3 (ref 3.4–10.8)
WHOLE BLOOD HOLD COAG: NORMAL
WHOLE BLOOD HOLD SPECIMEN: NORMAL

## 2025-06-22 PROCEDURE — 84484 ASSAY OF TROPONIN QUANT: CPT

## 2025-06-22 PROCEDURE — 96376 TX/PRO/DX INJ SAME DRUG ADON: CPT

## 2025-06-22 PROCEDURE — 81001 URINALYSIS AUTO W/SCOPE: CPT | Performed by: PHYSICIAN ASSISTANT

## 2025-06-22 PROCEDURE — 99284 EMERGENCY DEPT VISIT MOD MDM: CPT

## 2025-06-22 PROCEDURE — 93005 ELECTROCARDIOGRAM TRACING: CPT

## 2025-06-22 PROCEDURE — 36415 COLL VENOUS BLD VENIPUNCTURE: CPT

## 2025-06-22 PROCEDURE — 25010000002 HYDROMORPHONE 1 MG/ML SOLUTION

## 2025-06-22 PROCEDURE — 96374 THER/PROPH/DIAG INJ IV PUSH: CPT

## 2025-06-22 PROCEDURE — 85025 COMPLETE CBC W/AUTO DIFF WBC: CPT

## 2025-06-22 PROCEDURE — 25010000002 ONDANSETRON PER 1 MG: Performed by: PHYSICIAN ASSISTANT

## 2025-06-22 PROCEDURE — 96375 TX/PRO/DX INJ NEW DRUG ADDON: CPT

## 2025-06-22 PROCEDURE — 80053 COMPREHEN METABOLIC PANEL: CPT

## 2025-06-22 PROCEDURE — 83690 ASSAY OF LIPASE: CPT

## 2025-06-22 PROCEDURE — 25810000003 SODIUM CHLORIDE 0.9 % SOLUTION: Performed by: PHYSICIAN ASSISTANT

## 2025-06-22 PROCEDURE — 81025 URINE PREGNANCY TEST: CPT | Performed by: PHYSICIAN ASSISTANT

## 2025-06-22 PROCEDURE — 87086 URINE CULTURE/COLONY COUNT: CPT | Performed by: PHYSICIAN ASSISTANT

## 2025-06-22 RX ORDER — ONDANSETRON 4 MG/1
4 TABLET, ORALLY DISINTEGRATING ORAL EVERY 6 HOURS PRN
Qty: 12 TABLET | Refills: 0 | Status: SHIPPED | OUTPATIENT
Start: 2025-06-22

## 2025-06-22 RX ORDER — ONDANSETRON 2 MG/ML
4 INJECTION INTRAMUSCULAR; INTRAVENOUS ONCE
Status: COMPLETED | OUTPATIENT
Start: 2025-06-22 | End: 2025-06-22

## 2025-06-22 RX ORDER — HYDROCODONE BITARTRATE AND ACETAMINOPHEN 5; 325 MG/1; MG/1
1 TABLET ORAL 4 TIMES DAILY PRN
Qty: 12 TABLET | Refills: 0 | Status: SHIPPED | OUTPATIENT
Start: 2025-06-22

## 2025-06-22 RX ORDER — SODIUM CHLORIDE 0.9 % (FLUSH) 0.9 %
10 SYRINGE (ML) INJECTION AS NEEDED
Status: DISCONTINUED | OUTPATIENT
Start: 2025-06-22 | End: 2025-06-22 | Stop reason: HOSPADM

## 2025-06-22 RX ORDER — PROMETHAZINE HYDROCHLORIDE 12.5 MG/1
12.5 TABLET ORAL EVERY 6 HOURS PRN
Qty: 12 TABLET | Refills: 0 | Status: SHIPPED | OUTPATIENT
Start: 2025-06-22 | End: 2025-06-22

## 2025-06-22 RX ADMIN — ONDANSETRON 4 MG: 2 INJECTION INTRAMUSCULAR; INTRAVENOUS at 15:38

## 2025-06-22 RX ADMIN — HYDROMORPHONE HYDROCHLORIDE 1 MG: 1 INJECTION, SOLUTION INTRAMUSCULAR; INTRAVENOUS; SUBCUTANEOUS at 15:38

## 2025-06-22 RX ADMIN — SODIUM CHLORIDE 1000 ML: 9 INJECTION, SOLUTION INTRAVENOUS at 17:40

## 2025-06-22 RX ADMIN — HYDROMORPHONE HYDROCHLORIDE 1 MG: 1 INJECTION, SOLUTION INTRAMUSCULAR; INTRAVENOUS; SUBCUTANEOUS at 18:01

## 2025-06-22 NOTE — ED PROVIDER NOTES
Subjective   History of Present Illness  Pt c/o ruq pain for several days   Having N/V   Pt scheduled to have gallbladder out in 4 days   Wants out today     History provided by:  Patient  Abdominal Pain  Pain location:  RUQ  Pain quality: aching    Pain severity:  Moderate  Onset quality:  Sudden  Duration:  4 days  Timing:  Constant  Chronicity:  New  Relieved by:  Nothing  Worsened by:  Eating  Ineffective treatments:  None tried  Associated symptoms: nausea and vomiting        Review of Systems   Gastrointestinal:  Positive for abdominal pain, nausea and vomiting.       Past Medical History:   Diagnosis Date    ADHD (attention deficit hyperactivity disorder)     Anxiety     Depression     Kidney stones        Allergies   Allergen Reactions    Morphine Hives       Past Surgical History:   Procedure Laterality Date    URETEROSCOPY STENT INSERTION Right 11/06/2023    Procedure: URETEROSCOPY LASER LITHOTRIPSY WITH STENT INSERTION;  Surgeon: Farhad Allen MD;  Location: [facility];  Service: Urology;  Laterality: Right;    WRIST SURGERY Left     orif       Family History   Problem Relation Age of Onset    No Known Problems Mother     No Known Problems Father     Hypertension Maternal Grandmother     Hypertension Maternal Grandfather     Hypertension Paternal Grandmother     Diabetes Paternal Grandmother        Social History     Socioeconomic History    Marital status:    Tobacco Use    Smoking status: Every Day     Current packs/day: 0.00     Types: Cigarettes     Last attempt to quit: 2022     Years since quitting: 3.4     Passive exposure: Past    Smokeless tobacco: Never   Vaping Use    Vaping status: Some Days    Substances: Nicotine, Flavoring    Devices: Disposable    Passive vaping exposure: Yes   Substance and Sexual Activity    Alcohol use: No    Drug use: No    Sexual activity: Defer     Partners: Male     Birth control/protection: None           Objective   Physical Exam  Vitals and  nursing note reviewed.   Constitutional:       Appearance: She is well-developed.   HENT:      Head: Normocephalic.   Cardiovascular:      Rate and Rhythm: Normal rate and regular rhythm.   Pulmonary:      Effort: Pulmonary effort is normal.      Breath sounds: Normal breath sounds.   Abdominal:      General: Bowel sounds are normal.      Palpations: Abdomen is soft.      Tenderness: There is no abdominal tenderness.   Musculoskeletal:         General: Normal range of motion.      Cervical back: Neck supple.   Skin:     General: Skin is warm and dry.   Neurological:      Mental Status: She is alert and oriented to person, place, and time.   Psychiatric:         Behavior: Behavior normal.         Thought Content: Thought content normal.         Judgment: Judgment normal.         Procedures           ED Course  ED Course as of 06/25/25 1011   Sun Jun 22, 2025   1612 EKG interpretation normal sinus rhythm 92 bpm QTc is 430 no ST elevations or depressions.  Electronically signed by Rodrick Cuellar DO, 06/22/25, 4:13 PM EDT.   [GF]      ED Course User Index  [GF] Rodrick Cuellar DO                                                       Medical Decision Making  Pt c/o ruq pain for several days   Having N/V   Pt scheduled to have gallbladder out in 4 days   Wants out today   Cussed with Dr. Hernandez will have patient follow-up as regularly scheduled for her gallbladder removal.    Problems Addressed:  Calculus of bile duct without cholangitis or biliary obstruction: complicated acute illness or injury    Amount and/or Complexity of Data Reviewed  Labs: ordered.  ECG/medicine tests: ordered.    Risk  Prescription drug management.        Final diagnoses:   Calculus of bile duct without cholangitis or biliary obstruction       ED Disposition  ED Disposition       ED Disposition   Discharge    Condition   Stable    Comment   --               Yovana Robles, APRN  [address] [facility] [address]  [phone]    In 2  days           Medication List        New Prescriptions      HYDROcodone-acetaminophen 5-325 MG per tablet  Commonly known as: NORCO  Take 1 tablet by mouth 4 (Four) Times a Day As Needed for Moderate Pain.     ondansetron ODT 4 MG disintegrating tablet  Commonly known as: ZOFRAN-ODT  Place 1 tablet on the tongue Every 6 (Six) Hours As Needed for Nausea for up to 12 doses.               Where to Get Your Medications        These medications [facility] sent to [facility] [place] [place] [facility] [address] [phone] [phone]   [address] [address]      Phone: [phone]   HYDROcodone-acetaminophen 5-325 MG per tablet  ondansetron ODT 4 MG disintegrating tablet            Lanny Jiang PA  06/25/25 1011

## 2025-06-23 LAB
BACTERIA SPEC AEROBE CULT: NORMAL
BACTERIA SPEC AEROBE CULT: NORMAL

## 2025-06-24 LAB — BACTERIA SPEC AEROBE CULT: NORMAL

## 2025-06-25 ENCOUNTER — ANESTHESIA EVENT (OUTPATIENT)
Dept: PERIOP | Facility: HOSPITAL | Age: 30
End: 2025-06-25
Payer: MEDICAID

## 2025-06-25 LAB
QT INTERVAL: 348 MS
QTC INTERVAL: 430 MS

## 2025-06-26 ENCOUNTER — ANESTHESIA (OUTPATIENT)
Dept: PERIOP | Facility: HOSPITAL | Age: 30
End: 2025-06-26
Payer: MEDICAID

## 2025-06-26 ENCOUNTER — HOSPITAL ENCOUNTER (OUTPATIENT)
Facility: HOSPITAL | Age: 30
Setting detail: HOSPITAL OUTPATIENT SURGERY
Discharge: HOME | End: 2025-06-26
Attending: SURGERY | Admitting: SURGERY
Payer: MEDICAID

## 2025-06-26 ENCOUNTER — APPOINTMENT (OUTPATIENT)
Dept: GENERAL RADIOLOGY | Facility: HOSPITAL | Age: 30
End: 2025-06-26
Payer: MEDICAID

## 2025-06-26 VITALS
BODY MASS INDEX: 30.36 KG/M2 | HEART RATE: 66 BPM | SYSTOLIC BLOOD PRESSURE: 120 MMHG | DIASTOLIC BLOOD PRESSURE: 77 MMHG | TEMPERATURE: 97.5 F | RESPIRATION RATE: 18 BRPM | OXYGEN SATURATION: 99 % | WEIGHT: 165 LBS | HEIGHT: 62 IN

## 2025-06-26 DIAGNOSIS — K80.20 SYMPTOMATIC CHOLELITHIASIS: ICD-10-CM

## 2025-06-26 PROCEDURE — 25010000002 MIDAZOLAM PER 1 MG: Performed by: NURSE ANESTHETIST, CERTIFIED REGISTERED

## 2025-06-26 PROCEDURE — 25010000002 ROPIVACAINE PER 1 MG: Performed by: ANESTHESIOLOGY

## 2025-06-26 PROCEDURE — 25010000002 ONDANSETRON PER 1 MG: Performed by: NURSE ANESTHETIST, CERTIFIED REGISTERED

## 2025-06-26 PROCEDURE — 25010000002 FAMOTIDINE (PF) 20 MG/2ML SOLUTION: Performed by: NURSE ANESTHETIST, CERTIFIED REGISTERED

## 2025-06-26 PROCEDURE — 25010000002 CEFAZOLIN PER 500 MG

## 2025-06-26 PROCEDURE — 25810000003 LACTATED RINGERS PER 1000 ML: Performed by: ANESTHESIOLOGY

## 2025-06-26 PROCEDURE — 25010000002 INDOCYANINE GREEN 25 MG RECONSTITUTED SOLUTION

## 2025-06-26 PROCEDURE — 25010000002 KETOROLAC TROMETHAMINE PER 15 MG: Performed by: NURSE ANESTHETIST, CERTIFIED REGISTERED

## 2025-06-26 PROCEDURE — 25010000002 BUPRENORPHINE PER 0.1 MG: Performed by: ANESTHESIOLOGY

## 2025-06-26 PROCEDURE — S2900 ROBOTIC SURGICAL SYSTEM: HCPCS | Performed by: SURGERY

## 2025-06-26 PROCEDURE — 25010000002 LIDOCAINE PF 2% 2 % SOLUTION: Performed by: NURSE ANESTHETIST, CERTIFIED REGISTERED

## 2025-06-26 PROCEDURE — 25010000002 PROPOFOL 200 MG/20ML EMULSION: Performed by: NURSE ANESTHETIST, CERTIFIED REGISTERED

## 2025-06-26 PROCEDURE — 25010000002 MIDAZOLAM PER 1 MG: Performed by: ANESTHESIOLOGY

## 2025-06-26 PROCEDURE — 47563 LAPARO CHOLECYSTECTOMY/GRAPH: CPT | Performed by: SURGERY

## 2025-06-26 PROCEDURE — 25010000002 FENTANYL CITRATE (PF) 50 MCG/ML SOLUTION: Performed by: NURSE ANESTHETIST, CERTIFIED REGISTERED

## 2025-06-26 PROCEDURE — 25010000002 NEOSTIGMINE 10 MG/10ML SOLUTION: Performed by: NURSE ANESTHETIST, CERTIFIED REGISTERED

## 2025-06-26 PROCEDURE — 25010000002 GLYCOPYRROLATE 0.4 MG/2ML SOLUTION: Performed by: NURSE ANESTHETIST, CERTIFIED REGISTERED

## 2025-06-26 PROCEDURE — 25010000002 DEXAMETHASONE PER 1 MG: Performed by: ANESTHESIOLOGY

## 2025-06-26 DEVICE — HEMOLOK L 6 CLIPS/CART
Type: IMPLANTABLE DEVICE | Site: BILE DUCT | Status: FUNCTIONAL
Brand: WECK

## 2025-06-26 RX ORDER — MIDAZOLAM HYDROCHLORIDE 1 MG/ML
INJECTION, SOLUTION INTRAMUSCULAR; INTRAVENOUS AS NEEDED
Status: DISCONTINUED | OUTPATIENT
Start: 2025-06-26 | End: 2025-06-26 | Stop reason: SURG

## 2025-06-26 RX ORDER — ACETAMINOPHEN 325 MG/1
650 TABLET ORAL EVERY 4 HOURS PRN
Qty: 30 TABLET | Refills: 0 | Status: SHIPPED | OUTPATIENT
Start: 2025-06-26

## 2025-06-26 RX ORDER — ROCURONIUM BROMIDE 10 MG/ML
INJECTION, SOLUTION INTRAVENOUS AS NEEDED
Status: DISCONTINUED | OUTPATIENT
Start: 2025-06-26 | End: 2025-06-26 | Stop reason: SURG

## 2025-06-26 RX ORDER — PROPOFOL 10 MG/ML
INJECTION, EMULSION INTRAVENOUS AS NEEDED
Status: DISCONTINUED | OUTPATIENT
Start: 2025-06-26 | End: 2025-06-26 | Stop reason: SURG

## 2025-06-26 RX ORDER — ROPIVACAINE HYDROCHLORIDE 5 MG/ML
INJECTION, SOLUTION EPIDURAL; INFILTRATION; PERINEURAL
Status: COMPLETED | OUTPATIENT
Start: 2025-06-26 | End: 2025-06-26

## 2025-06-26 RX ORDER — SODIUM CHLORIDE 0.9 % (FLUSH) 0.9 %
10 SYRINGE (ML) INJECTION AS NEEDED
Status: DISCONTINUED | OUTPATIENT
Start: 2025-06-26 | End: 2025-06-26 | Stop reason: HOSPADM

## 2025-06-26 RX ORDER — KETOROLAC TROMETHAMINE 30 MG/ML
INJECTION, SOLUTION INTRAMUSCULAR; INTRAVENOUS AS NEEDED
Status: DISCONTINUED | OUTPATIENT
Start: 2025-06-26 | End: 2025-06-26 | Stop reason: SURG

## 2025-06-26 RX ORDER — FAMOTIDINE 10 MG/ML
INJECTION, SOLUTION INTRAVENOUS AS NEEDED
Status: DISCONTINUED | OUTPATIENT
Start: 2025-06-26 | End: 2025-06-26 | Stop reason: SURG

## 2025-06-26 RX ORDER — SODIUM CHLORIDE 0.9 % (FLUSH) 0.9 %
10 SYRINGE (ML) INJECTION EVERY 12 HOURS SCHEDULED
Status: DISCONTINUED | OUTPATIENT
Start: 2025-06-26 | End: 2025-06-26 | Stop reason: HOSPADM

## 2025-06-26 RX ORDER — ONDANSETRON 2 MG/ML
4 INJECTION INTRAMUSCULAR; INTRAVENOUS AS NEEDED
Status: DISCONTINUED | OUTPATIENT
Start: 2025-06-26 | End: 2025-06-26 | Stop reason: HOSPADM

## 2025-06-26 RX ORDER — SODIUM CHLORIDE 9 MG/ML
40 INJECTION, SOLUTION INTRAVENOUS AS NEEDED
Status: DISCONTINUED | OUTPATIENT
Start: 2025-06-26 | End: 2025-06-26 | Stop reason: HOSPADM

## 2025-06-26 RX ORDER — SODIUM CHLORIDE 0.9 % (FLUSH) 0.9 %
3 SYRINGE (ML) INJECTION EVERY 12 HOURS SCHEDULED
Status: DISCONTINUED | OUTPATIENT
Start: 2025-06-26 | End: 2025-06-26 | Stop reason: HOSPADM

## 2025-06-26 RX ORDER — MIDAZOLAM HYDROCHLORIDE 1 MG/ML
1 INJECTION, SOLUTION INTRAMUSCULAR; INTRAVENOUS
Status: COMPLETED | OUTPATIENT
Start: 2025-06-26 | End: 2025-06-26

## 2025-06-26 RX ORDER — INDOCYANINE GREEN AND WATER 25 MG
2.5 KIT INJECTION ONCE
Status: COMPLETED | OUTPATIENT
Start: 2025-06-26 | End: 2025-06-26

## 2025-06-26 RX ORDER — IBUPROFEN 600 MG/1
600 TABLET, FILM COATED ORAL EVERY 6 HOURS PRN
Qty: 30 TABLET | Refills: 0 | Status: SHIPPED | OUTPATIENT
Start: 2025-06-26

## 2025-06-26 RX ORDER — ONDANSETRON 2 MG/ML
INJECTION INTRAMUSCULAR; INTRAVENOUS AS NEEDED
Status: DISCONTINUED | OUTPATIENT
Start: 2025-06-26 | End: 2025-06-26 | Stop reason: SURG

## 2025-06-26 RX ORDER — GLYCOPYRROLATE 0.2 MG/ML
INJECTION INTRAMUSCULAR; INTRAVENOUS AS NEEDED
Status: DISCONTINUED | OUTPATIENT
Start: 2025-06-26 | End: 2025-06-26 | Stop reason: SURG

## 2025-06-26 RX ORDER — FENTANYL CITRATE 50 UG/ML
50 INJECTION, SOLUTION INTRAMUSCULAR; INTRAVENOUS
Status: DISCONTINUED | OUTPATIENT
Start: 2025-06-26 | End: 2025-06-26 | Stop reason: HOSPADM

## 2025-06-26 RX ORDER — IPRATROPIUM BROMIDE AND ALBUTEROL SULFATE 2.5; .5 MG/3ML; MG/3ML
3 SOLUTION RESPIRATORY (INHALATION) ONCE AS NEEDED
Status: DISCONTINUED | OUTPATIENT
Start: 2025-06-26 | End: 2025-06-26 | Stop reason: HOSPADM

## 2025-06-26 RX ORDER — SODIUM CHLORIDE, SODIUM LACTATE, POTASSIUM CHLORIDE, CALCIUM CHLORIDE 600; 310; 30; 20 MG/100ML; MG/100ML; MG/100ML; MG/100ML
125 INJECTION, SOLUTION INTRAVENOUS ONCE
Status: COMPLETED | OUTPATIENT
Start: 2025-06-26 | End: 2025-06-26

## 2025-06-26 RX ORDER — SODIUM CHLORIDE, SODIUM LACTATE, POTASSIUM CHLORIDE, CALCIUM CHLORIDE 600; 310; 30; 20 MG/100ML; MG/100ML; MG/100ML; MG/100ML
100 INJECTION, SOLUTION INTRAVENOUS ONCE AS NEEDED
Status: DISCONTINUED | OUTPATIENT
Start: 2025-06-26 | End: 2025-06-26 | Stop reason: HOSPADM

## 2025-06-26 RX ORDER — BUPRENORPHINE HYDROCHLORIDE 0.32 MG/ML
INJECTION INTRAMUSCULAR; INTRAVENOUS
Status: COMPLETED | OUTPATIENT
Start: 2025-06-26 | End: 2025-06-26

## 2025-06-26 RX ORDER — FENTANYL CITRATE 50 UG/ML
INJECTION, SOLUTION INTRAMUSCULAR; INTRAVENOUS AS NEEDED
Status: DISCONTINUED | OUTPATIENT
Start: 2025-06-26 | End: 2025-06-26 | Stop reason: SURG

## 2025-06-26 RX ORDER — TRAMADOL HYDROCHLORIDE 50 MG/1
50 TABLET ORAL EVERY 8 HOURS PRN
Qty: 12 TABLET | Refills: 0 | Status: SHIPPED | OUTPATIENT
Start: 2025-06-26

## 2025-06-26 RX ORDER — NEOSTIGMINE METHYLSULFATE 1 MG/ML
INJECTION INTRAVENOUS AS NEEDED
Status: DISCONTINUED | OUTPATIENT
Start: 2025-06-26 | End: 2025-06-26 | Stop reason: SURG

## 2025-06-26 RX ORDER — OXYCODONE AND ACETAMINOPHEN 5; 325 MG/1; MG/1
1 TABLET ORAL ONCE AS NEEDED
Status: COMPLETED | OUTPATIENT
Start: 2025-06-26 | End: 2025-06-26

## 2025-06-26 RX ORDER — MEPERIDINE HYDROCHLORIDE 25 MG/ML
12.5 INJECTION INTRAMUSCULAR; INTRAVENOUS; SUBCUTANEOUS
Status: DISCONTINUED | OUTPATIENT
Start: 2025-06-26 | End: 2025-06-26 | Stop reason: HOSPADM

## 2025-06-26 RX ORDER — HYDROMORPHONE HYDROCHLORIDE 1 MG/ML
0.5 INJECTION, SOLUTION INTRAMUSCULAR; INTRAVENOUS; SUBCUTANEOUS
Status: DISCONTINUED | OUTPATIENT
Start: 2025-06-26 | End: 2025-06-26 | Stop reason: HOSPADM

## 2025-06-26 RX ORDER — DEXAMETHASONE SODIUM PHOSPHATE 4 MG/ML
INJECTION, SOLUTION INTRA-ARTICULAR; INTRALESIONAL; INTRAMUSCULAR; INTRAVENOUS; SOFT TISSUE
Status: COMPLETED | OUTPATIENT
Start: 2025-06-26 | End: 2025-06-26

## 2025-06-26 RX ORDER — LIDOCAINE HYDROCHLORIDE 20 MG/ML
INJECTION, SOLUTION EPIDURAL; INFILTRATION; INTRACAUDAL; PERINEURAL AS NEEDED
Status: DISCONTINUED | OUTPATIENT
Start: 2025-06-26 | End: 2025-06-26 | Stop reason: SURG

## 2025-06-26 RX ADMIN — FENTANYL CITRATE 50 MCG: 50 INJECTION INTRAMUSCULAR; INTRAVENOUS at 11:00

## 2025-06-26 RX ADMIN — OXYCODONE HYDROCHLORIDE AND ACETAMINOPHEN 1 TABLET: 5; 325 TABLET ORAL at 12:03

## 2025-06-26 RX ADMIN — LIDOCAINE HYDROCHLORIDE 100 MG: 20 INJECTION, SOLUTION EPIDURAL; INFILTRATION; INTRACAUDAL; PERINEURAL at 10:39

## 2025-06-26 RX ADMIN — ROCURONIUM BROMIDE 35 MG: 10 SOLUTION INTRAVENOUS at 10:39

## 2025-06-26 RX ADMIN — MIDAZOLAM HYDROCHLORIDE 1 MG: 1 INJECTION, SOLUTION INTRAMUSCULAR; INTRAVENOUS at 09:12

## 2025-06-26 RX ADMIN — KETOROLAC TROMETHAMINE 30 MG: 30 INJECTION, SOLUTION INTRAMUSCULAR; INTRAVENOUS at 10:42

## 2025-06-26 RX ADMIN — PROPOFOL 200 MG: 10 INJECTION, EMULSION INTRAVENOUS at 10:39

## 2025-06-26 RX ADMIN — NEOSTIGMINE METHYLSULFATE 3 MG: 0.5 INJECTION INTRAVENOUS at 11:14

## 2025-06-26 RX ADMIN — ONDANSETRON 4 MG: 2 INJECTION, SOLUTION INTRAMUSCULAR; INTRAVENOUS at 10:32

## 2025-06-26 RX ADMIN — FENTANYL CITRATE 50 MCG: 50 INJECTION INTRAMUSCULAR; INTRAVENOUS at 10:51

## 2025-06-26 RX ADMIN — MIDAZOLAM HYDROCHLORIDE 2 MG: 1 INJECTION, SOLUTION INTRAMUSCULAR; INTRAVENOUS at 10:32

## 2025-06-26 RX ADMIN — ROPIVACAINE HYDROCHLORIDE 220 MG: 5 INJECTION, SOLUTION EPIDURAL; INFILTRATION; PERINEURAL at 10:43

## 2025-06-26 RX ADMIN — SODIUM CHLORIDE, POTASSIUM CHLORIDE, SODIUM LACTATE AND CALCIUM CHLORIDE: 600; 310; 30; 20 INJECTION, SOLUTION INTRAVENOUS at 10:32

## 2025-06-26 RX ADMIN — BUPRENORPHINE HYDROCHLORIDE 0.3 MG: 0.32 INJECTION INTRAMUSCULAR; INTRAVENOUS at 10:43

## 2025-06-26 RX ADMIN — INDOCYANINE GREEN AND WATER 2.5 MG: KIT at 08:59

## 2025-06-26 RX ADMIN — DEXAMETHASONE SODIUM PHOSPHATE 8 MG: 4 INJECTION, SOLUTION INTRA-ARTICULAR; INTRALESIONAL; INTRAMUSCULAR; INTRAVENOUS; SOFT TISSUE at 10:43

## 2025-06-26 RX ADMIN — FENTANYL CITRATE 50 MCG: 50 INJECTION, SOLUTION INTRAMUSCULAR; INTRAVENOUS at 12:03

## 2025-06-26 RX ADMIN — MIDAZOLAM HYDROCHLORIDE 1 MG: 1 INJECTION, SOLUTION INTRAMUSCULAR; INTRAVENOUS at 09:01

## 2025-06-26 RX ADMIN — FAMOTIDINE 20 MG: 10 INJECTION, SOLUTION INTRAVENOUS at 10:32

## 2025-06-26 RX ADMIN — CEFAZOLIN 2000 MG: 2 INJECTION, POWDER, FOR SOLUTION INTRAMUSCULAR; INTRAVENOUS at 10:32

## 2025-06-26 RX ADMIN — GLYCOPYRROLATE 0.6 MG: 0.2 INJECTION INTRAMUSCULAR; INTRAVENOUS at 11:14

## 2025-06-26 NOTE — OP NOTE
CHOLECYSTECTOMY LAPAROSCOPIC WITH DAVINCI ROBOT  Procedure Note    Nicki Schafer  6/26/2025    Pre-op Diagnosis:   Symptomatic cholelithiasis [K80.20]    Post-op Diagnosis:     Post-Op Diagnosis Codes:     * Symptomatic cholelithiasis [K80.20]    Procedure(s):  CHOLECYSTECTOMY LAPAROSCOPIC WITH DAVINCI ROBOT    Surgeon(s):  Adrian Hernandez MD    Anesthesia: General    Staff:   Circulator: Andrew Dowd RN  Scrub Person: Alvaro Treviño  Vendor Representative: Mary Ann Smith  Assistant: Rebecca Flynn    Findings: Distended gallbladder, critical view of safety obtained    Operative Procedure: The patient was taken to the operating suite and placed supine on operating table.  Bilateral sequential compression devices were applied and general endotracheal anesthesia administered.  The abdomen was prepped and draped in usual sterile fashion.  Preoperative antibiotics were confirmed.  Timeout procedure was performed.  A Veress needle was inserted at Hopkins's point and saline drop test confirmed entry into the peritoneal space.  Pneumoperitoneum was established.  An 8 mm Optiview trocar was inserted through an infraumbilical incision.  The laparoscope was inserted and the Veress needle site was free of injury.  The Veress needle site was removed and upsized to an 8 mm robotic trocar.  A second 8 mm robotic trocar was placed in the anterior axillary line at the level of the umbilicus of the right abdomen.  A 5 mm Optiview trocar was then placed as far lateral as possible in the right abdomen for an assistant trocar.  At this time the robot was docked to the trocars and the robotic camera inserted.  The patient had been placed in reverse Trendelenburg with left lateral tilt prior to docking.  I then exited the sterile field and entered the robotic console.  My assistant placed a robotic hook in the right arm #1 and a fenestrated bipolar in the left arm #2.  My assistant grasped the fundus of the gallbladder and  retracted anteriorly and superiorly. The gallbladder was distended.  Mild adhesions to the fundus and infundibulum were taken down with cautery hook.  The infundibulum was exposed and grasped and retracted laterally and inferiorly.  The peritoneum on the anterior posterior surface of the gallbladder was opened with the cautery hook.  The gallbladder was elevated from the gallbladder fossa for a portion and the cystic duct and artery were identified and dissected free circumferentially.  All adventitial tissue between the cystic duct and artery was dissected free with the cautery hook.  The cystic plate was visible from the anterior and posterior views with only the cystic duct and artery seen entering the gallbladder.  With the critical view of safety obtained Firefly confirmed no abnormal ductal abnormality and at this time the cystic artery was controlled with a single Hemoclip placed proximally on the artery and the cystic duct controlled with 2 hemoclips placed on the cystic duct stump side and one on the infundibular side of the duct. The artery was divided with the cautery hook with no evidence of bleeding.  The cystic duct was divided with the cut mode of the cautery hook with no evidence of cystic duct stump leak.  The gallbladder was then removed from the gallbladder fossa intact.  This was done with the cautery hook.  Firefly was used to confirm no evidence of duct of Luschka or accessory duct leakage.  There was no cystic duct stump leakage.  At this time robotic instruments were removed under direct visualization.  The robot was undocked and I entered the sterile field for completion of the case.  A 5 mm laparoscope was inserted through a robotic trocar and the gallbladder was placed in a 5 mm Endo Catch bag. It was removed through the infraumbilical fascial defect.  The gallbladder fossa was hemostatic and at this time all trocars were removed under direct visualization and pneumoperitoneum was  evacuated.  The infraumbilical fascial defect was closed with a figure-of-eight Vicryl suture and all skin incisions closed with Monocryl subcuticular suture and dressed with Skin Affix.  The patient tolerated the procedure well.    Estimated Blood Loss: 10mL    Specimens:   Gallbladder           Drains: none    Grafts/Implants:  none    Complications: none      Adrian Hernandez MD     Date: 6/26/2025  Time: 11:19 EDT

## 2025-06-26 NOTE — ANESTHESIA PREPROCEDURE EVALUATION
Anesthesia Evaluation     Patient summary reviewed and Nursing notes reviewed   no history of anesthetic complications:   NPO Solid Status: > 8 hours  NPO Liquid Status: > 8 hours           Airway   Mallampati: II  TM distance: >3 FB  Neck ROM: full  No difficulty expected  Dental    (+) poor dentition    Pulmonary - normal exam    breath sounds clear to auscultation  (+) a smoker (VAPE) Current, Smoked day of surgery,  Cardiovascular - negative cardio ROS and normal exam  Exercise tolerance: good (4-7 METS)    Rhythm: regular  Rate: normal        Neuro/Psych  (+) psychiatric history Depression and Anxiety  GI/Hepatic/Renal/Endo    (+) obesity, liver disease (biliary dyskenesia), renal disease- stones    Musculoskeletal (-) negative ROS    Abdominal  - normal exam    Abdomen: soft.   Substance History - negative use     OB/GYN    (-)  Pregnant        Other - negative ROS                   Anesthesia Plan    ASA 2     general with block     (Tap BLOCK OK)  intravenous induction     Anesthetic plan, risks, benefits, and alternatives have been provided, discussed and informed consent has been obtained with: patient.  Pre-procedure education provided  Use of blood products discussed with  Consented to blood products.    Plan discussed with CRNA.    CODE STATUS:

## 2025-06-26 NOTE — ANESTHESIA POSTPROCEDURE EVALUATION
Patient: Nicki Schafer    Procedure Summary       Date: 06/26/25 Room / Location: [facility] OR 04 /  [facility]    Anesthesia Start: 1032 Anesthesia Stop: 1127    Procedure: CHOLECYSTECTOMY LAPAROSCOPIC WITH DAVINCI ROBOT (Abdomen) Diagnosis:       Symptomatic cholelithiasis      (Symptomatic cholelithiasis [K80.20])    Surgeons: Adrian Hernandez MD Provider: Shiv Ortiz MD    Anesthesia Type: general with block ASA Status: 2            Anesthesia Type: general with block    Vitals  Vitals Value Taken Time   /91 06/26/25 12:15   Temp 97.1 °F (36.2 °C) 06/26/25 11:30   Pulse 106 06/26/25 12:16   Resp 18 06/26/25 12:15   SpO2 100 % 06/26/25 12:16   Vitals shown include unfiled device data.        Post Anesthesia Care and Evaluation    Patient location during evaluation: PHASE II  Patient participation: complete - patient participated  Level of consciousness: awake and alert  Pain score: 1  Pain management: adequate    Airway patency: patent  Anesthetic complications: No anesthetic complications  PONV Status: controlled  Cardiovascular status: acceptable  Respiratory status: acceptable  Hydration status: acceptable

## 2025-06-26 NOTE — ANESTHESIA PROCEDURE NOTES
Airway  Reason: elective    Date/Time: 6/26/2025 10:40 AM  Airway not difficult    General Information and Staff    Patient location during procedure: OR  CRNA/CAA: Zakia Gaspar CRNA    Indications and Patient Condition  Indications for airway management: airway protection    Preoxygenated: yes  MILS maintained throughout    Mask difficulty assessment: 1 - vent by mask    Final Airway Details    Final airway type: endotracheal airway      Successful airway: ETT  Cuffed: yes   Successful intubation technique: direct laryngoscopy  Adjuncts used in placement: intubating stylet  Endotracheal tube insertion site: oral  Blade: [place]  Blade size: 3  ETT size (mm): 7.0  Cormack-Lehane Classification: grade I - full view of glottis  Placement verified by: chest auscultation and capnometry   Measured from: lips  ETT/EBT  to lips (cm): 20  Number of attempts at approach: 1  Assessment: lips, teeth, and gum same as pre-op and atraumatic intubation

## 2025-06-26 NOTE — ANESTHESIA PROCEDURE NOTES
"Peripheral Block      Patient reassessed immediately prior to procedure    Patient location during procedure: OR  Start time: 6/26/2025 10:41 AM  Stop time: 6/26/2025 10:43 AM  Reason for block: at surgeon's request and post-op pain management  Performed by  CRNA/CAA: Zakia Gaspar CRNA  Preanesthetic Checklist  Completed: patient identified, IV checked, site marked, risks and benefits discussed, surgical consent, monitors and equipment checked, pre-op evaluation and timeout performed  Prep:  Pt Position: supine  Sterile barriers:cap, gloves, sterile barriers and mask  Prep: ChloraPrep  Patient monitoring: blood pressure monitoring, continuous pulse oximetry and EKG  Procedure    Nursing cardiac assessment comments yes: Sedation, GA, Spinal,Epidural   Performed under: general  Guidance:ultrasound guided    ULTRASOUND INTERPRETATION.  Using ultrasound guidance a 20 G (20g 4\" Stimuplex) gauge needle was placed in close proximity to the nerve, at which point, under ultrasound guidance anesthetic was injected in the area of the nerve and spread of the anesthesia was seen on ultrasound in close proximity thereto.  There were no abnormalities seen on ultrasound; a digital image was taken; and the patient tolerated the procedure with no complications. Images:still images obtained    Laterality:Bilateral  Block Type:TAP  Injection Technique:single-shot  Needle Type:short-bevel  Needle Gauge:20 G  Resistance on Injection: none    Medications Used: buprenorphine (BUPRENEX) injection - Peripheral Nerve, Transversus Abdominus Plane   0.3 mg - 6/26/2025 10:43:00 AM  dexamethasone (DECADRON) injection - Peripheral Nerve, Transversus Abdominus Plane   8 mg - 6/26/2025 10:43:00 AM  ropivacaine (NAROPIN) injection 0.5 % - Peripheral Nerve, Transversus Abdominus Plane   220 mg - 6/26/2025 10:43:00 AM      Medications  Comment:Block Injection:  Total volume divided equally between all 4 injection sites      Post " Assessment  Injection Assessment: negative aspiration for heme, incremental injection and no paresthesia on injection  Patient Tolerance:comfortable throughout block  Complications:no  Additional Notes  The pt was in the supine position under general anesthesia    Under Ultrasound guidance, a BBraun 4inch 360 degree needle was advanced with Normal Saline hydro dissection of tissue.  The Internal Oblique and Transversus Abdominus muscles where visualized.  At or before the aponeurosis of Internal Oblique, local anesthetic spread was visualized in the Transversus Abdominus Plane. Injection was made incrementally with aspiration every 5 mls.  There was no  intravascular injection,  injection pressure was normal, there was no neural injection, and the procedure was completed without difficulty. The same procedure was completed for left and right sided lateral tap blocks.    Under Ultrasound guidance, a Rapp 4inch 360 degree needle was advanced with Normal Saline hydro dissection of tissue.  The Rectus and Transversus Abdominus muscles where visualized.  The needle tip was placed between the Transversus Abdominus and rectus abdominus, local anesthetic spread was visualized in the Transversus Abdominus Plane. Injection was made incrementally with aspiration every 5 mls.  There was no  intravascular injection,  injection pressure was normal, there was no neural injection, and the procedure was completed without difficulty. The same procedure was completed for left and right sided subcostal tap blocks. Thank You.      Performed by: Zakia Gaspar CRNA

## 2025-06-30 LAB — REF LAB TEST METHOD: NORMAL

## 2025-07-16 ENCOUNTER — OFFICE VISIT (OUTPATIENT)
Dept: SURGERY | Facility: CLINIC | Age: 30
End: 2025-07-16
Payer: MEDICAID

## 2025-07-16 VITALS — BODY MASS INDEX: 30.36 KG/M2 | WEIGHT: 165 LBS | HEIGHT: 62 IN

## 2025-07-16 DIAGNOSIS — K80.20 SYMPTOMATIC CHOLELITHIASIS: Primary | ICD-10-CM

## 2025-07-16 PROCEDURE — 99024 POSTOP FOLLOW-UP VISIT: CPT | Performed by: SURGERY

## 2025-07-16 PROCEDURE — 1159F MED LIST DOCD IN RCRD: CPT | Performed by: SURGERY

## 2025-07-16 PROCEDURE — 1160F RVW MEDS BY RX/DR IN RCRD: CPT | Performed by: SURGERY

## 2025-07-16 NOTE — PROGRESS NOTES
Subjective   Nicki Schafer is a 30 y.o. female  is here today for follow-up.         Nicki Schafer is a 30 y.o. female here for followup after cholecystectomy.  The patient is doing well and tolerating diet.  Their incisions are healing well.  No complaints reported    Pathology consistent with chronic cholecystitis    Physical Exam:  NAD, AOx3  RRR  CTAB  S/appropriately tender/incisions healing well          Diagnoses and all orders for this visit:    1. Symptomatic cholelithiasis (Primary)        Assessment     30 y.o. female s/p cholecystectomy secondary to chronic cholecystitis.  The patient is doing well and will follow-up as needed.

## 2025-08-18 ENCOUNTER — APPOINTMENT (OUTPATIENT)
Dept: CT IMAGING | Facility: HOSPITAL | Age: 30
End: 2025-08-18

## 2025-08-18 ENCOUNTER — HOSPITAL ENCOUNTER (EMERGENCY)
Facility: HOSPITAL | Age: 30
Discharge: HOME | End: 2025-08-19
Attending: STUDENT IN AN ORGANIZED HEALTH CARE EDUCATION/TRAINING PROGRAM | Admitting: STUDENT IN AN ORGANIZED HEALTH CARE EDUCATION/TRAINING PROGRAM

## 2025-08-18 DIAGNOSIS — R10.9 FLANK PAIN: Primary | ICD-10-CM

## 2025-08-18 LAB
ALBUMIN SERPL-MCNC: 4.3 G/DL (ref 3.5–5.2)
ALBUMIN/GLOB SERPL: 1.4 G/DL
ALP SERPL-CCNC: 54 U/L (ref 39–117)
ALT SERPL W P-5'-P-CCNC: 10 U/L (ref 1–33)
AMPHET+METHAMPHET UR QL: NEGATIVE
AMPHETAMINES UR QL: POSITIVE
ANION GAP SERPL CALCULATED.3IONS-SCNC: 12.2 MMOL/L (ref 5–15)
AST SERPL-CCNC: 20 U/L (ref 1–32)
BARBITURATES UR QL SCN: NEGATIVE
BASOPHILS # BLD AUTO: 0.1 10*3/MM3 (ref 0–0.2)
BASOPHILS NFR BLD AUTO: 1 % (ref 0–1.5)
BENZODIAZ UR QL SCN: NEGATIVE
BILIRUB SERPL-MCNC: 0.5 MG/DL (ref 0–1.2)
BUN SERPL-MCNC: 22.2 MG/DL (ref 6–20)
BUN/CREAT SERPL: 28.8 (ref 7–25)
BUPRENORPHINE SERPL-MCNC: NEGATIVE NG/ML
CALCIUM SPEC-SCNC: 8.9 MG/DL (ref 8.6–10.5)
CANNABINOIDS SERPL QL: POSITIVE
CHLORIDE SERPL-SCNC: 108 MMOL/L (ref 98–107)
CO2 SERPL-SCNC: 21.8 MMOL/L (ref 22–29)
COCAINE UR QL: NEGATIVE
CREAT SERPL-MCNC: 0.77 MG/DL (ref 0.57–1)
CRP SERPL-MCNC: <0.3 MG/DL (ref 0–0.5)
DEPRECATED RDW RBC AUTO: 45.8 FL (ref 37–54)
EGFRCR SERPLBLD CKD-EPI 2021: 105.9 ML/MIN/1.73
EOSINOPHIL # BLD AUTO: 0.39 10*3/MM3 (ref 0–0.4)
EOSINOPHIL NFR BLD AUTO: 3.7 % (ref 0.3–6.2)
ERYTHROCYTE [DISTWIDTH] IN BLOOD BY AUTOMATED COUNT: 13.3 % (ref 12.3–15.4)
ETHANOL BLD-MCNC: <10 MG/DL (ref 0–10)
ETHANOL UR QL: <0.01 %
FENTANYL UR-MCNC: NEGATIVE NG/ML
GLOBULIN UR ELPH-MCNC: 3 GM/DL
GLUCOSE SERPL-MCNC: 117 MG/DL (ref 65–99)
HCG SERPL QL: NEGATIVE
HCT VFR BLD AUTO: 46.6 % (ref 34–46.6)
HGB BLD-MCNC: 15 G/DL (ref 12–15.9)
HOLD SPECIMEN: NORMAL
IMM GRANULOCYTES # BLD AUTO: 0.03 10*3/MM3 (ref 0–0.05)
IMM GRANULOCYTES NFR BLD AUTO: 0.3 % (ref 0–0.5)
LIPASE SERPL-CCNC: 43 U/L (ref 13–60)
LYMPHOCYTES # BLD AUTO: 2.61 10*3/MM3 (ref 0.7–3.1)
LYMPHOCYTES NFR BLD AUTO: 25 % (ref 19.6–45.3)
MCH RBC QN AUTO: 30 PG (ref 26.6–33)
MCHC RBC AUTO-ENTMCNC: 32.2 G/DL (ref 31.5–35.7)
MCV RBC AUTO: 93.2 FL (ref 79–97)
METHADONE UR QL SCN: NEGATIVE
MONOCYTES # BLD AUTO: 0.71 10*3/MM3 (ref 0.1–0.9)
MONOCYTES NFR BLD AUTO: 6.8 % (ref 5–12)
NEUTROPHILS NFR BLD AUTO: 6.61 10*3/MM3 (ref 1.7–7)
NEUTROPHILS NFR BLD AUTO: 63.2 % (ref 42.7–76)
NRBC BLD AUTO-RTO: 0 /100 WBC (ref 0–0.2)
OPIATES UR QL: NEGATIVE
OXYCODONE UR QL SCN: NEGATIVE
PCP UR QL SCN: NEGATIVE
PLATELET # BLD AUTO: 347 10*3/MM3 (ref 140–450)
PMV BLD AUTO: 11 FL (ref 6–12)
POTASSIUM SERPL-SCNC: 5.2 MMOL/L (ref 3.5–5.2)
PROT SERPL-MCNC: 7.3 G/DL (ref 6–8.5)
RBC # BLD AUTO: 5 10*6/MM3 (ref 3.77–5.28)
SODIUM SERPL-SCNC: 142 MMOL/L (ref 136–145)
TRICYCLICS UR QL SCN: NEGATIVE
WBC NRBC COR # BLD AUTO: 10.45 10*3/MM3 (ref 3.4–10.8)
WHOLE BLOOD HOLD COAG: NORMAL
WHOLE BLOOD HOLD SPECIMEN: NORMAL

## 2025-08-18 PROCEDURE — 96375 TX/PRO/DX INJ NEW DRUG ADDON: CPT

## 2025-08-18 PROCEDURE — 83690 ASSAY OF LIPASE: CPT

## 2025-08-18 PROCEDURE — 25810000003 SODIUM CHLORIDE 0.9 % SOLUTION: Performed by: PHYSICIAN ASSISTANT

## 2025-08-18 PROCEDURE — 74177 CT ABD & PELVIS W/CONTRAST: CPT

## 2025-08-18 PROCEDURE — 25510000001 IOPAMIDOL 61 % SOLUTION: Performed by: STUDENT IN AN ORGANIZED HEALTH CARE EDUCATION/TRAINING PROGRAM

## 2025-08-18 PROCEDURE — 99285 EMERGENCY DEPT VISIT HI MDM: CPT

## 2025-08-18 PROCEDURE — 82077 ASSAY SPEC XCP UR&BREATH IA: CPT | Performed by: PHYSICIAN ASSISTANT

## 2025-08-18 PROCEDURE — 25010000002 ONDANSETRON PER 1 MG: Performed by: PHYSICIAN ASSISTANT

## 2025-08-18 PROCEDURE — 80053 COMPREHEN METABOLIC PANEL: CPT

## 2025-08-18 PROCEDURE — 25010000002 KETOROLAC TROMETHAMINE PER 15 MG: Performed by: PHYSICIAN ASSISTANT

## 2025-08-18 PROCEDURE — 86140 C-REACTIVE PROTEIN: CPT | Performed by: PHYSICIAN ASSISTANT

## 2025-08-18 PROCEDURE — 80307 DRUG TEST PRSMV CHEM ANLYZR: CPT | Performed by: PHYSICIAN ASSISTANT

## 2025-08-18 PROCEDURE — 84703 CHORIONIC GONADOTROPIN ASSAY: CPT

## 2025-08-18 PROCEDURE — 85025 COMPLETE CBC W/AUTO DIFF WBC: CPT

## 2025-08-18 PROCEDURE — 96374 THER/PROPH/DIAG INJ IV PUSH: CPT

## 2025-08-18 PROCEDURE — 36415 COLL VENOUS BLD VENIPUNCTURE: CPT

## 2025-08-18 RX ORDER — IOPAMIDOL 612 MG/ML
100 INJECTION, SOLUTION INTRAVASCULAR
Status: COMPLETED | OUTPATIENT
Start: 2025-08-18 | End: 2025-08-18

## 2025-08-18 RX ORDER — KETOROLAC TROMETHAMINE 30 MG/ML
30 INJECTION, SOLUTION INTRAMUSCULAR; INTRAVENOUS ONCE
Status: COMPLETED | OUTPATIENT
Start: 2025-08-18 | End: 2025-08-18

## 2025-08-18 RX ORDER — SODIUM CHLORIDE 0.9 % (FLUSH) 0.9 %
10 SYRINGE (ML) INJECTION AS NEEDED
Status: DISCONTINUED | OUTPATIENT
Start: 2025-08-18 | End: 2025-08-19 | Stop reason: HOSPADM

## 2025-08-18 RX ORDER — ONDANSETRON 2 MG/ML
4 INJECTION INTRAMUSCULAR; INTRAVENOUS ONCE
Status: COMPLETED | OUTPATIENT
Start: 2025-08-18 | End: 2025-08-18

## 2025-08-18 RX ADMIN — ONDANSETRON 4 MG: 2 INJECTION, SOLUTION INTRAMUSCULAR; INTRAVENOUS at 22:12

## 2025-08-18 RX ADMIN — IOPAMIDOL 75 ML: 612 INJECTION, SOLUTION INTRAVENOUS at 21:46

## 2025-08-18 RX ADMIN — KETOROLAC TROMETHAMINE 30 MG: 30 INJECTION INTRAMUSCULAR; INTRAVENOUS at 22:12

## 2025-08-18 RX ADMIN — SODIUM CHLORIDE 1000 ML: 9 INJECTION, SOLUTION INTRAVENOUS at 21:53

## 2025-08-19 VITALS
WEIGHT: 150 LBS | SYSTOLIC BLOOD PRESSURE: 96 MMHG | TEMPERATURE: 98.2 F | DIASTOLIC BLOOD PRESSURE: 78 MMHG | BODY MASS INDEX: 27.6 KG/M2 | OXYGEN SATURATION: 100 % | HEART RATE: 81 BPM | RESPIRATION RATE: 16 BRPM | HEIGHT: 62 IN

## 2025-08-19 PROCEDURE — 74177 CT ABD & PELVIS W/CONTRAST: CPT | Performed by: RADIOLOGY

## 2025-08-23 ENCOUNTER — APPOINTMENT (OUTPATIENT)
Dept: CT IMAGING | Facility: HOSPITAL | Age: 30
End: 2025-08-23
Payer: MEDICAID

## 2025-08-23 ENCOUNTER — HOSPITAL ENCOUNTER (EMERGENCY)
Facility: HOSPITAL | Age: 30
Discharge: HOME | End: 2025-08-23
Payer: MEDICAID

## (undated) DEVICE — TISSUE RETRIEVAL SYSTEM: Brand: INZII RETRIEVAL SYSTEM

## (undated) DEVICE — GLV SURG PREMIERPRO MIC LTX PF SZ7.5 BRN

## (undated) DEVICE — UNDERGLV SURG BIOGEL INDICAT PF 8 GRN

## (undated) DEVICE — SEAL

## (undated) DEVICE — GLW STD STR 3CM .035X150CM

## (undated) DEVICE — SUT VIC 0/0 UR6 27IN DYED J603H

## (undated) DEVICE — GW SUP AMPLATZ ULTR/STFF/STR PTFE SS 0.35IN 145CM

## (undated) DEVICE — COR CYSTO: Brand: MEDLINE INDUSTRIES, INC.

## (undated) DEVICE — CVR DISP HUG U VAC STEEP TREND

## (undated) DEVICE — PAD GRND REM POLYHESIVE A/ DISP

## (undated) DEVICE — PERMANENT CAUTERY HOOK: Brand: ENDOWRIST

## (undated) DEVICE — FIBR LASR FLEXIVAPULSE365 HOLMIUM FLT/TP 1P/U

## (undated) DEVICE — 40595 XL TRENDELENBURG POSITIONING KIT: Brand: 40595 XL TRENDELENBURG POSITIONING KIT

## (undated) DEVICE — BLADELESS OBTURATOR: Brand: WECK VISTA

## (undated) DEVICE — GLV SURG PREMIERPRO MIC LTX PF SZ8 BRN

## (undated) DEVICE — SUT MNCRYL 4/0 PS2 18 IN

## (undated) DEVICE — COVER,MAYO STAND,STERILE: Brand: MEDLINE

## (undated) DEVICE — PK LAP GEN 70

## (undated) DEVICE — ARM DRAPE

## (undated) DEVICE — CATH FOL BARDEX IC 2WY 22F 5CC

## (undated) DEVICE — LARGE HEM-O-LOK CLIP APPLIER: Brand: ENDOWRIST

## (undated) DEVICE — GW ZIPWIRE STD/SHFT STR TPR/3CM .035IN 150CM

## (undated) DEVICE — INSUFFLATION NEEDLE TO ESTABLISH PNEUMOPERITONEUM.: Brand: INSUFFLATION NEEDLE

## (undated) DEVICE — TUBING, SUCTION, 1/4" X 20', STRAIGHT: Brand: MEDLINE INDUSTRIES, INC.

## (undated) DEVICE — CADIERE FORCEPS: Brand: ENDOWRIST

## (undated) DEVICE — URETERAL ACCESS SHEATH SET: Brand: NAVIGATOR

## (undated) DEVICE — PAD POSTN ARMBND 1P/U

## (undated) DEVICE — DRAINBAG,ANTI-REFLUX TOWER,L/F,2000ML,LL: Brand: MEDLINE